# Patient Record
Sex: FEMALE | Race: WHITE | NOT HISPANIC OR LATINO | Employment: FULL TIME | URBAN - METROPOLITAN AREA
[De-identification: names, ages, dates, MRNs, and addresses within clinical notes are randomized per-mention and may not be internally consistent; named-entity substitution may affect disease eponyms.]

---

## 2023-06-30 PROBLEM — F19.20 DRUG DEPENDENCE (MULTI): Status: ACTIVE | Noted: 2023-06-30

## 2023-06-30 PROBLEM — G47.00 INSOMNIA: Status: ACTIVE | Noted: 2023-06-30

## 2023-06-30 PROBLEM — L72.9 INFECTED CYST OF SKIN: Status: ACTIVE | Noted: 2023-06-30

## 2023-06-30 PROBLEM — J45.909 REACTIVE AIRWAY DISEASE (HHS-HCC): Status: ACTIVE | Noted: 2023-06-30

## 2023-06-30 PROBLEM — M54.81 OCCIPITAL NEURALGIA OF LEFT SIDE: Status: ACTIVE | Noted: 2023-06-30

## 2023-06-30 PROBLEM — M54.50 LOW BACK PAIN: Status: ACTIVE | Noted: 2023-06-30

## 2023-06-30 PROBLEM — F11.21 OPIOID TYPE DEPENDENCE IN REMISSION (MULTI): Status: ACTIVE | Noted: 2023-06-30

## 2023-06-30 PROBLEM — B00.1 HERPES LABIALIS: Status: ACTIVE | Noted: 2023-06-30

## 2023-06-30 PROBLEM — R71.8 ELEVATED MCV: Status: ACTIVE | Noted: 2023-06-30

## 2023-06-30 PROBLEM — N80.9 ENDOMETRIOSIS: Status: ACTIVE | Noted: 2023-06-30

## 2023-06-30 PROBLEM — K64.4 EXTERNAL HEMORRHOIDS: Status: ACTIVE | Noted: 2023-06-30

## 2023-06-30 PROBLEM — N63.20 LEFT BREAST LUMP: Status: ACTIVE | Noted: 2023-06-30

## 2023-06-30 PROBLEM — L08.9 INFECTED CYST OF SKIN: Status: ACTIVE | Noted: 2023-06-30

## 2023-06-30 PROBLEM — F32.A DEPRESSION: Status: ACTIVE | Noted: 2023-06-30

## 2023-06-30 PROBLEM — L02.91 ABSCESS: Status: ACTIVE | Noted: 2023-06-30

## 2023-06-30 PROBLEM — K59.00 CONSTIPATION: Status: ACTIVE | Noted: 2023-06-30

## 2023-06-30 PROBLEM — M25.561 RIGHT ANTERIOR KNEE PAIN: Status: ACTIVE | Noted: 2023-06-30

## 2023-06-30 PROBLEM — M25.551 RIGHT HIP PAIN: Status: ACTIVE | Noted: 2023-06-30

## 2023-06-30 PROBLEM — J30.9 ALLERGIC RHINITIS: Status: ACTIVE | Noted: 2023-06-30

## 2023-06-30 PROBLEM — M54.2 CHRONIC NECK PAIN: Status: ACTIVE | Noted: 2023-06-30

## 2023-06-30 PROBLEM — G89.29 CHRONIC NECK PAIN: Status: ACTIVE | Noted: 2023-06-30

## 2023-06-30 PROBLEM — R10.30 LOWER ABDOMINAL PAIN: Status: ACTIVE | Noted: 2023-06-30

## 2023-06-30 PROBLEM — S67.22XA HAND CRUSH INJURY, LEFT, INITIAL ENCOUNTER: Status: ACTIVE | Noted: 2023-06-30

## 2023-06-30 PROBLEM — F11.20 OPIATE DEPENDENCE, CONTINUOUS (MULTI): Status: ACTIVE | Noted: 2023-06-30

## 2023-06-30 PROBLEM — J45.909 ASTHMA (HHS-HCC): Status: ACTIVE | Noted: 2023-06-30

## 2023-06-30 PROBLEM — R05.9 COUGH: Status: ACTIVE | Noted: 2023-06-30

## 2023-06-30 PROBLEM — F11.10: Status: ACTIVE | Noted: 2023-06-30

## 2023-06-30 RX ORDER — BUDESONIDE AND FORMOTEROL FUMARATE DIHYDRATE 160; 4.5 UG/1; UG/1
2 AEROSOL RESPIRATORY (INHALATION) 2 TIMES DAILY
COMMUNITY
Start: 2022-10-05

## 2023-06-30 RX ORDER — BUTALBITAL, ACETAMINOPHEN AND CAFFEINE 300; 40; 50 MG/1; MG/1; MG/1
1 CAPSULE ORAL
COMMUNITY

## 2023-06-30 RX ORDER — IBUPROFEN 800 MG/1
800 TABLET ORAL 4 TIMES DAILY
COMMUNITY
Start: 2021-11-15 | End: 2024-02-05

## 2023-07-03 ENCOUNTER — APPOINTMENT (OUTPATIENT)
Dept: PRIMARY CARE | Facility: CLINIC | Age: 43
End: 2023-07-03
Payer: COMMERCIAL

## 2024-02-05 ENCOUNTER — OFFICE VISIT (OUTPATIENT)
Dept: PAIN MEDICINE | Facility: HOSPITAL | Age: 44
End: 2024-02-05
Payer: COMMERCIAL

## 2024-02-05 ENCOUNTER — APPOINTMENT (OUTPATIENT)
Dept: PAIN MEDICINE | Facility: HOSPITAL | Age: 44
End: 2024-02-05
Payer: COMMERCIAL

## 2024-02-05 VITALS
HEIGHT: 57 IN | DIASTOLIC BLOOD PRESSURE: 95 MMHG | WEIGHT: 99.8 LBS | BODY MASS INDEX: 21.53 KG/M2 | HEART RATE: 80 BPM | SYSTOLIC BLOOD PRESSURE: 153 MMHG | RESPIRATION RATE: 18 BRPM

## 2024-02-05 DIAGNOSIS — M54.81 OCCIPITAL NEURALGIA OF LEFT SIDE: Primary | ICD-10-CM

## 2024-02-05 PROCEDURE — 99204 OFFICE O/P NEW MOD 45 MIN: CPT | Performed by: PHYSICAL MEDICINE & REHABILITATION

## 2024-02-05 PROCEDURE — 99214 OFFICE O/P EST MOD 30 MIN: CPT | Performed by: PHYSICAL MEDICINE & REHABILITATION

## 2024-02-05 RX ORDER — IBUPROFEN 800 MG/1
800 TABLET ORAL 3 TIMES DAILY
Qty: 90 TABLET | Refills: 0 | Status: SHIPPED | OUTPATIENT
Start: 2024-02-05 | End: 2024-05-05

## 2024-02-05 RX ORDER — NORTRIPTYLINE HYDROCHLORIDE 10 MG/1
10-20 CAPSULE ORAL NIGHTLY
Qty: 60 CAPSULE | Refills: 2 | Status: SHIPPED | OUTPATIENT
Start: 2024-02-05 | End: 2024-06-03 | Stop reason: SDUPTHER

## 2024-02-05 ASSESSMENT — ENCOUNTER SYMPTOMS
OCCASIONAL FEELINGS OF UNSTEADINESS: 0
DEPRESSION: 0
LOSS OF SENSATION IN FEET: 0

## 2024-02-05 ASSESSMENT — PATIENT HEALTH QUESTIONNAIRE - PHQ9
2. FEELING DOWN, DEPRESSED OR HOPELESS: NOT AT ALL
SUM OF ALL RESPONSES TO PHQ9 QUESTIONS 1 AND 2: 0
1. LITTLE INTEREST OR PLEASURE IN DOING THINGS: NOT AT ALL

## 2024-02-05 ASSESSMENT — PAIN SCALES - GENERAL: PAINLEVEL: 7

## 2024-02-05 NOTE — PROGRESS NOTES
New pt c/o occipital neuralgia, had injections by Dr Olguin over a year ago. Injections helped. Pain is constant on left side, radiates into head. Describes has tingling/numbing sensation. At times may feel nausea. Tried cervical massager-did not help.  Pain score 7/10    Meds: ibuprofen prn    Had injections and Trigger point injections    Has tried heat, ice, TENS-does not really help. Memory foam pillow-hasn't helped.    OA 2/5/24    Subjective   Patient ID: Cora Solares is a 44 y.o. female who presents for Neuralgia (Occipital neuralgia).  HPI    44-year-old female with history of occipital neuralgia to left side for about 3 years.  No inciting event, patient previously had seen another pain physician where she received occipital nerve blocks as well as trigger point injections.  The occipital nerve blocks helped significantly but has been a number of months or even a year or 2 since she has had any injections.  She has frequent headaches associated with this left-sided occipital pain with radiation from the occiput into bandlike fashion the left side to the top of her head and to above her eye.  Also with some numbness tingling feelings occasionally around the occiput on the left.  She is previously taking Fioricet which does help some.  Also takes 800 mg of ibuprofen and is asking for refill of that when she gets headaches at work.  Has never been on any neuropathic pain medications for this pain specifically.  Was on gabapentin previously for some other issues.  No radiation into her arms and no weakness.  Pain is worse with turning her head and movement.                  Monitoring and compliance:    ORT:    PDUQ:    Office Agreement: 2/5/24      Review of Systems     Current Outpatient Medications   Medication Instructions    albuterol 90 mcg/actuation aerosol powdr breath activated inhaler 2 puffs, inhalation, Every 6 hours    budesonide-formoteroL (Symbicort) 160-4.5 mcg/actuation inhaler 2 puffs,  inhalation, 2 times daily    butalbital-acetaminophen-caff (Fioricet) -40 mg capsule 1 capsule, oral, Every 4 hours RT    ibuprofen 800 mg, oral, 4 times daily        Past Medical History:   Diagnosis Date    Acute laryngitis 03/08/2016    Acute laryngitis    Cutaneous abscess of chest wall 05/28/2014    Chest wall abscess    Cutaneous abscess of left lower limb 10/18/2016    Abscess of left thigh    Cutaneous abscess of right lower limb 08/14/2014    Abscess of right thigh    Noninfective gastroenteritis and colitis, unspecified 10/21/2014    Acute gastroenteritis    Other fecal abnormalities 02/02/2016    Loose stools    Personal history of other diseases of the respiratory system 09/26/2016    History of acute sinusitis    Personal history of other diseases of the respiratory system 01/13/2015    History of influenza    Personal history of other infectious and parasitic diseases 11/30/2015    History of herpes labialis    Personal history of other specified conditions 04/11/2016    History of nausea    Personal history of other specified conditions 11/30/2015    History of dysuria    Personal history of urinary (tract) infections 12/31/2014    History of urinary tract infection    Unspecified abdominal pain 02/26/2016    Abdominal cramping    Unspecified otitis externa, right ear 06/18/2014    Otitis externa of right ear        Past Surgical History:   Procedure Laterality Date    HYSTERECTOMY  05/19/2014    Hysterectomy        Family History   Problem Relation Name Age of Onset    Hypertension Mother      Diabetes Mother      Hypertension Father          Allergies   Allergen Reactions    Magnesium Citrate Other     mouth sores    Penicillins Unknown    Sulfa (Sulfonamide Antibiotics) Unknown    Sulfamethoxazole-Trimethoprim Other        Objective     Vitals:    02/05/24 1356   BP: (!) 153/95   Pulse: 80   Resp: 18        Physical Exam    GENERAL EXAM  Vital Signs: Vital signs to include heart rate,  respiration rate, blood pressure, and temperature were reviewed.  General Appearance:  Awake, alert, healthy appearing, well developed, No acute distress.  Head: Normocephalic without evidence of head injury.  Neck: The appearance of the neck was normal without swelling with a midline trachea.  Eyes: The eyelids and eyebrows exhibited no abnormalities.  Pupils were not pin-point.  Sclera was without icterus.  Lungs: Respiration rhythm and depth was normal.  Respiratory movements were normal without labored breathing.  Cardiovascular: No peripheral edema was present.    Neurological: Patient was oriented to time, place, and person.  Speech was normal.  Balance, gait, and stance were unremarkable.    Psychiatric: Appearance was normal with appropriate dress.  Mood was euthymic and affect was normal.  Skin: Affected regions were without ecchymosis or skin lesions.    Neck (MSK/Neuro/Skin):  Skin: no skin lesions or scars  No visible abnormality, no TTP, AROM and PROM WNL without evidence of instability, crepitus,   Strength:  5/5 in all planes bilateral upper extremities  no masses    Sensation grossly intact to bilateral upper extremities  Deep tendon reflexes equal bilateral upper extremities  No TTP to neck/thoracic muscles    Physical exam as above except:  Tenderness over the occipital area of her left neck/head with positive Tinel's and reproduction of the radiating symptoms up into her head.  Some palpable trigger points in her left upper and middle trapezius muscle as well.      Assessment/Plan   Diagnoses and all orders for this visit:  Occipital neuralgia of left side  -     nortriptyline (Pamelor) 10 mg capsule; Take 1-2 capsules (10-20 mg) by mouth once daily at bedtime.  -     Nerve Block; Future  -     ibuprofen 800 mg tablet; Take 1 tablet (800 mg) by mouth 3 times a day.    41-year-old female with history and physical consistent with occipital neuralgia.  Given that she is had good relief from previous  injections I think it would be reasonable to get her scheduled for a left occipital nerve block which I do under ultrasound guidance.  Patient is on any blood thinners.  Our plan for today overall:  - We will start low-dose nortriptyline 10 to 20 mg at night for neuropathic pain.  - We will schedule patient for left occipital nerve block under ultrasound guidance.  Discussed risks, benefits and alternatives and patient would like to proceed  - I will prescribe patient prescription strength ibuprofen 800 mg to take when she has headache at work however I did instruct patient to try to minimize the amount of NSAID use as these can cause rebound headaches.         This note was generated with the aid of dictation software, there may be typos despite my attempts at proofreading.

## 2024-03-22 ENCOUNTER — HOSPITAL ENCOUNTER (OUTPATIENT)
Dept: RADIOLOGY | Facility: HOSPITAL | Age: 44
Discharge: HOME | End: 2024-03-22
Payer: COMMERCIAL

## 2024-03-22 ENCOUNTER — HOSPITAL ENCOUNTER (OUTPATIENT)
Dept: GASTROENTEROLOGY | Facility: HOSPITAL | Age: 44
Discharge: HOME | End: 2024-03-22
Payer: COMMERCIAL

## 2024-03-22 VITALS
HEIGHT: 58 IN | DIASTOLIC BLOOD PRESSURE: 78 MMHG | RESPIRATION RATE: 14 BRPM | BODY MASS INDEX: 20.57 KG/M2 | SYSTOLIC BLOOD PRESSURE: 110 MMHG | WEIGHT: 98 LBS | TEMPERATURE: 97.9 F | HEART RATE: 59 BPM | OXYGEN SATURATION: 100 %

## 2024-03-22 DIAGNOSIS — M54.81 OCCIPITAL NEURALGIA OF LEFT SIDE: ICD-10-CM

## 2024-03-22 DIAGNOSIS — R52 PAIN: ICD-10-CM

## 2024-03-22 PROCEDURE — 2500000004 HC RX 250 GENERAL PHARMACY W/ HCPCS (ALT 636 FOR OP/ED): Performed by: PHYSICAL MEDICINE & REHABILITATION

## 2024-03-22 PROCEDURE — 64405 NJX AA&/STRD GR OCPL NRV: CPT | Performed by: PHYSICAL MEDICINE & REHABILITATION

## 2024-03-22 PROCEDURE — 2500000005 HC RX 250 GENERAL PHARMACY W/O HCPCS: Performed by: PHYSICAL MEDICINE & REHABILITATION

## 2024-03-22 PROCEDURE — 64405 NJX AA&/STRD GR OCPL NRV: CPT | Mod: LT | Performed by: PHYSICAL MEDICINE & REHABILITATION

## 2024-03-22 RX ORDER — METHYLPREDNISOLONE ACETATE 40 MG/ML
INJECTION, SUSPENSION INTRA-ARTICULAR; INTRALESIONAL; INTRAMUSCULAR; SOFT TISSUE AS NEEDED
Status: COMPLETED | OUTPATIENT
Start: 2024-03-22 | End: 2024-03-22

## 2024-03-22 RX ORDER — ROPIVACAINE HYDROCHLORIDE 5 MG/ML
INJECTION, SOLUTION EPIDURAL; INFILTRATION; PERINEURAL AS NEEDED
Status: COMPLETED | OUTPATIENT
Start: 2024-03-22 | End: 2024-03-22

## 2024-03-22 RX ORDER — LIDOCAINE HYDROCHLORIDE 5 MG/ML
INJECTION, SOLUTION INFILTRATION; INTRAVENOUS AS NEEDED
Status: COMPLETED | OUTPATIENT
Start: 2024-03-22 | End: 2024-03-22

## 2024-03-22 RX ADMIN — LIDOCAINE HYDROCHLORIDE 15 ML: 5 INJECTION, SOLUTION INFILTRATION at 09:53

## 2024-03-22 RX ADMIN — METHYLPREDNISOLONE ACETATE 40 MG: 40 INJECTION, SUSPENSION INTRA-ARTICULAR; INTRALESIONAL; INTRAMUSCULAR; SOFT TISSUE at 09:54

## 2024-03-22 RX ADMIN — ROPIVACAINE HYDROCHLORIDE 15 ML: 5 INJECTION, SOLUTION EPIDURAL; INFILTRATION; PERINEURAL at 09:54

## 2024-03-22 ASSESSMENT — PAIN SCALES - GENERAL
PAINLEVEL_OUTOF10: 4
PAINLEVEL_OUTOF10: 4

## 2024-03-22 ASSESSMENT — COLUMBIA-SUICIDE SEVERITY RATING SCALE - C-SSRS
6. HAVE YOU EVER DONE ANYTHING, STARTED TO DO ANYTHING, OR PREPARED TO DO ANYTHING TO END YOUR LIFE?: NO
2. HAVE YOU ACTUALLY HAD ANY THOUGHTS OF KILLING YOURSELF?: NO
1. IN THE PAST MONTH, HAVE YOU WISHED YOU WERE DEAD OR WISHED YOU COULD GO TO SLEEP AND NOT WAKE UP?: NO

## 2024-03-22 ASSESSMENT — PAIN - FUNCTIONAL ASSESSMENT
PAIN_FUNCTIONAL_ASSESSMENT: 0-10
PAIN_FUNCTIONAL_ASSESSMENT: 0-10

## 2024-03-22 NOTE — H&P
History Of Present Illness  Cora Solares is a 44 y.o. female presents for procedure state below. Endorses no changes in past medical history or medical health since last seen in clinic.     Past Medical History  She has a past medical history of Acute laryngitis (03/08/2016), Cutaneous abscess of chest wall (05/28/2014), Cutaneous abscess of left lower limb (10/18/2016), Cutaneous abscess of right lower limb (08/14/2014), Noninfective gastroenteritis and colitis, unspecified (10/21/2014), Other fecal abnormalities (02/02/2016), Personal history of other diseases of the respiratory system (09/26/2016), Personal history of other diseases of the respiratory system (01/13/2015), Personal history of other infectious and parasitic diseases (11/30/2015), Personal history of other specified conditions (04/11/2016), Personal history of other specified conditions (11/30/2015), Personal history of urinary (tract) infections (12/31/2014), Unspecified abdominal pain (02/26/2016), and Unspecified otitis externa, right ear (06/18/2014).    Surgical History  She has a past surgical history that includes Hysterectomy (05/19/2014).     Social History  She reports that she has been smoking cigarettes. She has never used smokeless tobacco. No history on file for alcohol use and drug use.    Family History  Family History   Problem Relation Name Age of Onset    Hypertension Mother      Diabetes Mother      Hypertension Father          Allergies  Magnesium citrate, Penicillins, Sulfa (sulfonamide antibiotics), and Sulfamethoxazole-trimethoprim    Review of Symptoms:   Constitutional: Negative for chills, diaphoresis or fever  HENT: Negative for neck swelling  Eyes:.  Negative for eye pain  Respiratory:.  Negative for cough, shortness of breath or wheezing    Cardiovascular:.  Negative for chest pain or palpitations  Gastrointestinal:.  Negative for abdominal pain, nausea and vomiting  Genitourinary:.  Negative for  urgency  Musculoskeletal:  Positive for back pain. Positive for joint pain. Denies falls within the past 3 months.  Skin: Negative for wounds or itching   Neurological: Negative for dizziness, seizures, loss of consciousness and weakness  Endo/Heme/Allergies: Does not bruise/bleed easily  Psychiatric/Behavioral: Negative for depression. The patient does not appear anxious.       PHYSICAL EXAM  Vitals signs reviewed  Constitutional:       General: Not in acute distress     Appearance: Normal appearance. Not ill-appearing.  HENT:     Head: Normocephalic and atraumatic  Eyes:     Conjunctiva/sclera: Conjunctivae normal  Cardiovascular:     Rate and Rhythm: Normal rate and regular rhythm  Pulmonary:     Effort: No respiratory distress  Abdominal:     Palpations: Abdomen is soft  Musculoskeletal: MCINTYRE  Skin:     General: Skin is warm and dry  Neurological:     General: No focal deficit present  Psychiatric:         Mood and Affect: Mood normal         Behavior: Behavior normal     Last Recorded Vitals  There were no vitals taken for this visit.    Relevant Results  Current Outpatient Medications   Medication Instructions    albuterol 90 mcg/actuation aerosol powdr breath activated inhaler 2 puffs, inhalation, Every 6 hours    budesonide-formoteroL (Symbicort) 160-4.5 mcg/actuation inhaler 2 puffs, inhalation, 2 times daily    butalbital-acetaminophen-caff (Fioricet) -40 mg capsule 1 capsule, oral, Every 4 hours RT    ibuprofen 800 mg, oral, 3 times daily    nortriptyline (PAMELOR) 10-20 mg, oral, Nightly       No results found for this or any previous visit from the past 1000 days.     No image results found.       No diagnosis found.     ASSESSMENT/PLAN  Cora Solares is a 44 y.o. female presents for left occipital nerve block under ultrasound guidance.     Our plan is as follows:  - Follow In pain clinic  - Continue to participate in physical therapy as well as physician directed home exercises  - Continue pain  medications as prescribed       Placido Hawkins MD

## 2024-03-22 NOTE — PROCEDURES
Nerve Block    Date/Time: 3/22/2024 9:56 AM    Performed by: Hussein Dawson MD  Authorized by: Hussein Dawson MD    Consent:     Consent obtained:  Written    Consent given by:  Patient    Risks, benefits, and alternatives were discussed: yes      Risks discussed:  Nerve damage, infection, allergic reaction, swelling, bleeding and pain    Alternatives discussed:  No treatment, delayed treatment and alternative treatment  Universal protocol:     Procedure explained and questions answered to patient or proxy's satisfaction: yes      Relevant documents present and verified: yes      Test results available: yes      Imaging studies available: yes      Required blood products, implants, devices, and special equipment available: yes      Site/side marked: yes      Immediately prior to procedure, a time out was called: yes      Patient identity confirmed:  Verbally with patient, hospital-assigned identification number and arm band  Comments:      The patient was positioned comfortably in the prone position and was prepped and draped in a sterile fashion.  Sterile precautions, including sterile gloves, disposable cap and masks were worn for the procedure at all times. Skeletal and soft tissue landmarks were identified under palpation and ultrasound if utilized.  The target nerve and surrounding vascular structures were identified utlizing ultrasound. There was no evidence of infection at the site(s) of needle insertion. The skin and subcutaneous tissue at insertion site was anesthetized with 1% lidocaine with or without sodium bicarbonate.   A needle was advanced utilizing an in-plane approach to the target nerve(s).  The needle was flushed and/or restyletted and removed.  Pressure was held, and after ensuring hemostasis and the absence of hematoma, an adhesive bandage was applied to the site of needle insertion.    Nerve: Greater occipital  Laterality: Left  Medications: [3mL 0.5% Ropivacaine] [40 mg  Depo-Medrol]

## 2024-03-22 NOTE — DISCHARGE INSTRUCTIONS
You had a pain management procedure today.    Observe/ monitor for the following signs & symptoms:  If you notice Excessive bleeding (slow general oozing that completely soaks the dressing, or fresh bright red bleeding).   In either case, apply pressure to the area, elevate it if possible & call your doctor at once.    Also observe for:  Change in color  Numbness/tingling  Coldness to the touch  Swelling  Drainage  Temperature of 101.5 or higher.  Increased, uncontrollable pain.    *If you notice the above signs & symptoms, please call your doctor right away!*      Discharge Instructions:    Your pain may not be gone immediately after this procedure; it generally takes 3 to 5 days for the steroid to work.   Keep the needle site clean & dry for 24 hours.  Continue your present medications.  Make an appointment to see your doctor in 2-3 weeks.  If any problems occur, or if you have any further questions, please call as soon as possible. If you find that you cannot reach your doctor, but feel that the condition nees a doctor's attention, go to the closest emergency department & take this discharge paper with you.       Dr. Dawson's Office: [phone]

## 2024-04-22 ENCOUNTER — APPOINTMENT (OUTPATIENT)
Dept: PAIN MEDICINE | Facility: HOSPITAL | Age: 44
End: 2024-04-22
Payer: COMMERCIAL

## 2024-04-29 ENCOUNTER — OFFICE VISIT (OUTPATIENT)
Dept: PAIN MEDICINE | Facility: HOSPITAL | Age: 44
End: 2024-04-29
Payer: COMMERCIAL

## 2024-04-29 VITALS
SYSTOLIC BLOOD PRESSURE: 119 MMHG | DIASTOLIC BLOOD PRESSURE: 82 MMHG | HEART RATE: 88 BPM | HEIGHT: 58 IN | BODY MASS INDEX: 21.24 KG/M2 | OXYGEN SATURATION: 98 % | RESPIRATION RATE: 16 BRPM | WEIGHT: 101.2 LBS

## 2024-04-29 DIAGNOSIS — G89.29 CHRONIC NECK PAIN: ICD-10-CM

## 2024-04-29 DIAGNOSIS — M54.81 OCCIPITAL NEURALGIA OF LEFT SIDE: Primary | ICD-10-CM

## 2024-04-29 DIAGNOSIS — M54.2 CHRONIC NECK PAIN: ICD-10-CM

## 2024-04-29 PROCEDURE — 99214 OFFICE O/P EST MOD 30 MIN: CPT | Performed by: CLINICAL NURSE SPECIALIST

## 2024-04-29 ASSESSMENT — COLUMBIA-SUICIDE SEVERITY RATING SCALE - C-SSRS
1. IN THE PAST MONTH, HAVE YOU WISHED YOU WERE DEAD OR WISHED YOU COULD GO TO SLEEP AND NOT WAKE UP?: NO
6. HAVE YOU EVER DONE ANYTHING, STARTED TO DO ANYTHING, OR PREPARED TO DO ANYTHING TO END YOUR LIFE?: NO
2. HAVE YOU ACTUALLY HAD ANY THOUGHTS OF KILLING YOURSELF?: NO

## 2024-04-29 ASSESSMENT — ENCOUNTER SYMPTOMS
LOSS OF SENSATION IN FEET: 0
OCCASIONAL FEELINGS OF UNSTEADINESS: 0
DEPRESSION: 0

## 2024-04-29 ASSESSMENT — PATIENT HEALTH QUESTIONNAIRE - PHQ9
2. FEELING DOWN, DEPRESSED OR HOPELESS: NOT AT ALL
1. LITTLE INTEREST OR PLEASURE IN DOING THINGS: NOT AT ALL
SUM OF ALL RESPONSES TO PHQ9 QUESTIONS 1 AND 2: 0

## 2024-04-29 NOTE — PROGRESS NOTES
Subjective   Patient ID: Cora Solares is a 44 y.o. female who presents for occipital neuralgia.    HPI    44-year-old female with history of occipital neuralgia left side for approximately 3 to 4 years presents for follow-up.  Previous occipital nerve blocks as well as trigger point injections with a different pain physician.  She felt that the occipital nerve blocks provided significant relief in the past.  Occipital pain accompanied by frequent headaches.  Manages her pain with ibuprofen 800 mg.  Added nortriptyline 10 to 20 mg at bedtime at her last office visit for neuropathic pain.  Scheduled at her last office visit to repeat left greater occipital nerve block.  She presents at today's office visit for follow-up after recent injection.  She is endorsing greater than 75% relief with her most recent left greater occipital nerve block.  She has noted a decrease in headache days over the past 4 to 5 weeks.  She is also noted that she is able to work with less pain.  She denies any numbness/tingling or weakness.  She has noted some return of left occipital pain with occasional radiation from the left occiput posteriorly into her head.  She states that she continues to get some relief with this injection, however, she would like to discuss repeating this injection in the next 4 to 6 weeks.  She does not want the pain to get so out of control with increasing headaches as this impaired her ability to work.  She does feel that work activity which includes pushing and pulling with her left arm consistently as well as looking down increases her pain.  And her pain with nortriptyline 20 mg at bedtime which she does feel has been helpful for neuropathic symptoms.  She continues to use ibuprofen 800 more for pain left calf injury and last for occipital pain.  Continues to utilize medical marijuana.    Pt is here for injection follow up. Pt has left sided neck pain.    Injection 3/22/24 ONB Left- 75% relief would like to  repeat    Medications: Nortriptyline 2 capsules @ bedtime      OAR[facility]:  Merle Kc, APRN-CNP, APRN-CNS on 4/29/2024  3:21 PM  I have personally reviewed the OARRS report for Cora Solares. I have considered the risks of abuse, dependence, addiction and diversion    Is the patient prescribed a combination of a benzodiazepine and opioid?  No    Last Urine Drug Screen / ordered today: No  No results found for this or any previous visit (from the past 8760 hour(s)).  N/A    Controlled Substance Agreement:  Date of the Last Agreement:       Monitoring and compliance:    ORT:    PDUQ:    Office Agreement:      Review of Systems    ROS:   General: No fevers, chills, weight loss  Skin: Negative for lesions  Eyes: No acute vision changes  Ears: No vertigo  Nose, mouth, throat: No difficulty swallowing or speaking  Respiratory: No cough, shortness of breath, cyanosis  Cardiovascular: Negative for chest pain syncope or palpitation  Gastrointestinal: No constipation, nausea, vomiting  Neurological: Positive for intermittent headache,   Psychological: Negative for severe or debilitating anxiety, depression. Negative memory loss  Musculoskeletal: Positive for arthralgia, myalgia and pain  Endocrine: Negative for weight gain, appetite changes, excessive sweating  Allergy/immune: Negative    All 13 systems were reviewed and are within normal levels except as noted or in the history of present illness.  Positive or pertinent negative responses are noted or were in the history of present illness. As noted, the patient denies significant or impairing weakness in the bilateral upper and lower extremities, medication induced constipation, and bowel or bladder incontinence.     Current Outpatient Medications:     albuterol 90 mcg/actuation aerosol powdr breath activated inhaler, Inhale 2 puffs every 6 hours., Disp: , Rfl:     budesonide-formoteroL (Symbicort) 160-4.5 mcg/actuation inhaler, Inhale 2 puffs 2 times a day., Disp: ,  Rfl:     ibuprofen 800 mg tablet, Take 1 tablet (800 mg) by mouth 3 times a day., Disp: 90 tablet, Rfl: 0    nortriptyline (Pamelor) 10 mg capsule, Take 1-2 capsules (10-20 mg) by mouth once daily at bedtime., Disp: 60 capsule, Rfl: 2    butalbital-acetaminophen-caff (Fioricet) -40 mg capsule, Take 1 capsule by mouth every 4 hours., Disp: , Rfl:      Past Medical History:   Diagnosis Date    Acute laryngitis 03/08/2016    Acute laryngitis    Cutaneous abscess of chest wall 05/28/2014    Chest wall abscess    Cutaneous abscess of left lower limb 10/18/2016    Abscess of left thigh    Cutaneous abscess of right lower limb 08/14/2014    Abscess of right thigh    Noninfective gastroenteritis and colitis, unspecified 10/21/2014    Acute gastroenteritis    Other fecal abnormalities 02/02/2016    Loose stools    Personal history of other diseases of the respiratory system 09/26/2016    History of acute sinusitis    Personal history of other diseases of the respiratory system 01/13/2015    History of influenza    Personal history of other infectious and parasitic diseases 11/30/2015    History of herpes labialis    Personal history of other specified conditions 04/11/2016    History of nausea    Personal history of other specified conditions 11/30/2015    History of dysuria    Personal history of urinary (tract) infections 12/31/2014    History of urinary tract infection    Unspecified abdominal pain 02/26/2016    Abdominal cramping    Unspecified otitis externa, right ear 06/18/2014    Otitis externa of right ear        Past Surgical History:   Procedure Laterality Date    HYSTERECTOMY  05/19/2014    Hysterectomy        Family History   Problem Relation Name Age of Onset    Hypertension Mother      Diabetes Mother      Hypertension Father          Allergies   Allergen Reactions    Magnesium Citrate Other     mouth sores    Penicillins Unknown    Sulfa (Sulfonamide Antibiotics) Unknown    Sulfamethoxazole-Trimethoprim  "Other        Objective     Visit Vitals  /82   Pulse 88   Resp 16   Ht 1.461 m (4' 9.5\")   Wt 45.9 kg (101 lb 3.2 oz)   SpO2 98%   BMI 21.52 kg/m²   OB Status Hysterectomy   Smoking Status Every Day   BSA 1.36 m²        Physical Exam    PE:  General: Well-developed, well-nourished, no acute distress. The patient demonstrates no pain behavior, symptom magnification or overt drug-seeking behavior.  Eye: Pupils appropriate for room lighting  Neck/thyroid: No obvious goiter or enlargement of neck noted  Respiratory exam: Normal respiratory effort, unlabored respiration. No accessory muscle use noted  Cardiac exam: Bilateral radial pulses intact  Abdominal: Nondistended  Spine, cervical: Mild tenderness to paraspinous musculature upper trapezius muscles left greater than right.  Tenderness over left greater occipital protuberance.  Flexion and extension intact with extension exacerbating symptoms on the left.  Rotational twisting intact.  Spine, lumbar: The patient is able to rise from a seated to standing position without hesitancy, push off, or delay. Gait is grossly nonantalgic.  No tenderness to paraspinous musculature is noted.   Neurologic exam: Muscle strength is antigravity in all 4 extremities.  Equal muscle strength bilateral upper extremities 5/5.  Psychiatric exam: Judgment and insight normal, affect normal, speech is fluent, affect appropriate, demonstrating no signs of hypersomnolence, sedation, or confusion        Assessment/Plan   Problem List Items Addressed This Visit             ICD-10-CM    Chronic neck pain M54.2, G89.29    Occipital neuralgia of left side - Primary M54.81     44 year-old female with history and physical consistent with left-sided occipital neuralgia.  Patient has previously done very well with greater occipital nerve blocks providing significant sustained relief.  Scheduled at her last office visit to repeat left greater occipital nerve block.  Patient endorsed greater than " 75% relief from her most recent greater occipital nerve block.  She does feel that she continues to receive relief, however, she has noted some tenderness and return of symptoms. She would like to schedule a repeat left greater occipital nerve block in 4 to 6 weeks so that pain does not get out of control and interfere with her ability to work.  We will schedule the patient to repeat left greater occipital nerve block in 4 to 6 weeks.  If at that time she continues to receive relief from her most recent injection she may cancel this appointment.  Patient is not on blood thinners.  She continues to benefit from nortriptyline 20 mg at bedtime.  She will use ibuprofen 800 mg as needed and is using it more often for her calf injury and less often for occipital pain.  Plan discussed with patient at today's office visit.      - Continue low-dose nortriptyline 10 to 20 mg at night for neuropathic pain.  - We will schedule patient to repeat left occipital nerve block under ultrasound guidance.  Discussed risks, benefits and alternatives and patient would like to proceed CPT.  [id number].  If at the time of the patient's injection she continues to receive relief from her occipital nerve block she may cancel this appointment.  - Continue prescription strength ibuprofen 800 mg to take when she has headache at work.  Instructedpatient to try to minimize the amount of NSAID use as these can cause rebound headaches.  -Patient will follow-up 4 weeks after repeating her injection for reevaluation.    Disclaimer: This note was transcribed using an audio transcription device.  As such, minor errors may be present with regard to spelling, punctuation, and inadvertent word insertion.  Please disregard such errors.           Relevant Orders    Nerve Block

## 2024-04-29 NOTE — ASSESSMENT & PLAN NOTE
44 year-old female with history and physical consistent with left-sided occipital neuralgia.  Patient has previously done very well with greater occipital nerve blocks providing significant sustained relief.  Scheduled at her last office visit to repeat left greater occipital nerve block.  Patient endorsed greater than 75% relief from her most recent greater occipital nerve block.  She does feel that she continues to receive relief, however, she has noted some tenderness and return of symptoms. She would like to schedule a repeat left greater occipital nerve block in 4 to 6 weeks so that pain does not get out of control and interfere with her ability to work.  We will schedule the patient to repeat left greater occipital nerve block in 4 to 6 weeks.  If at that time she continues to receive relief from her most recent injection she may cancel this appointment.  Patient is not on blood thinners.  She continues to benefit from nortriptyline 20 mg at bedtime.  She will use ibuprofen 800 mg as needed and is using it more often for her calf injury and less often for occipital pain.  Plan discussed with patient at today's office visit.      - Continue low-dose nortriptyline 10 to 20 mg at night for neuropathic pain.  - We will schedule patient to repeat left occipital nerve block under ultrasound guidance.  Discussed risks, benefits and alternatives and patient would like to proceed CPT.  [id number].  If at the time of the patient's injection she continues to receive relief from her occipital nerve block she may cancel this appointment.  - Continue prescription strength ibuprofen 800 mg to take when she has headache at work.  Instructedpatient to try to minimize the amount of NSAID use as these can cause rebound headaches.  -Patient will follow-up 4 weeks after repeating her injection for reevaluation.    Disclaimer: This note was transcribed using an audio transcription device.  As such, minor errors may be present with  regard to spelling, punctuation, and inadvertent word insertion.  Please disregard such errors.

## 2024-05-24 ENCOUNTER — HOSPITAL ENCOUNTER (OUTPATIENT)
Dept: GASTROENTEROLOGY | Facility: HOSPITAL | Age: 44
Discharge: HOME | End: 2024-05-24
Payer: COMMERCIAL

## 2024-05-24 VITALS
WEIGHT: 101 LBS | HEIGHT: 58 IN | DIASTOLIC BLOOD PRESSURE: 80 MMHG | TEMPERATURE: 98 F | RESPIRATION RATE: 16 BRPM | HEART RATE: 60 BPM | OXYGEN SATURATION: 100 % | SYSTOLIC BLOOD PRESSURE: 119 MMHG | BODY MASS INDEX: 21.2 KG/M2

## 2024-05-24 DIAGNOSIS — M54.81 OCCIPITAL NEURALGIA OF LEFT SIDE: ICD-10-CM

## 2024-05-24 PROCEDURE — 7100000010 HC PHASE TWO TIME - EACH INCREMENTAL 1 MINUTE

## 2024-05-24 PROCEDURE — 64405 NJX AA&/STRD GR OCPL NRV: CPT | Performed by: PHYSICAL MEDICINE & REHABILITATION

## 2024-05-24 PROCEDURE — 2500000004 HC RX 250 GENERAL PHARMACY W/ HCPCS (ALT 636 FOR OP/ED): Performed by: PHYSICAL MEDICINE & REHABILITATION

## 2024-05-24 PROCEDURE — 2500000005 HC RX 250 GENERAL PHARMACY W/O HCPCS: Performed by: PHYSICAL MEDICINE & REHABILITATION

## 2024-05-24 PROCEDURE — 7100000009 HC PHASE TWO TIME - INITIAL BASE CHARGE

## 2024-05-24 PROCEDURE — 64405 NJX AA&/STRD GR OCPL NRV: CPT | Mod: LT | Performed by: PHYSICAL MEDICINE & REHABILITATION

## 2024-05-24 RX ORDER — ROPIVACAINE HYDROCHLORIDE 5 MG/ML
INJECTION, SOLUTION EPIDURAL; INFILTRATION; PERINEURAL AS NEEDED
Status: COMPLETED | OUTPATIENT
Start: 2024-05-24 | End: 2024-05-24

## 2024-05-24 RX ORDER — METHYLPREDNISOLONE ACETATE 40 MG/ML
INJECTION, SUSPENSION INTRA-ARTICULAR; INTRALESIONAL; INTRAMUSCULAR; SOFT TISSUE AS NEEDED
Status: COMPLETED | OUTPATIENT
Start: 2024-05-24 | End: 2024-05-24

## 2024-05-24 RX ORDER — LIDOCAINE HYDROCHLORIDE 5 MG/ML
INJECTION, SOLUTION INFILTRATION; INTRAVENOUS AS NEEDED
Status: COMPLETED | OUTPATIENT
Start: 2024-05-24 | End: 2024-05-24

## 2024-05-24 RX ADMIN — METHYLPREDNISOLONE ACETATE 40 MG: 40 INJECTION, SUSPENSION INTRA-ARTICULAR; INTRALESIONAL; INTRAMUSCULAR; SOFT TISSUE at 11:27

## 2024-05-24 RX ADMIN — ROPIVACAINE HYDROCHLORIDE 10 ML: 5 INJECTION, SOLUTION EPIDURAL; INFILTRATION; PERINEURAL at 11:27

## 2024-05-24 RX ADMIN — LIDOCAINE HYDROCHLORIDE 10 ML: 5 INJECTION, SOLUTION INFILTRATION at 11:25

## 2024-05-24 ASSESSMENT — PAIN - FUNCTIONAL ASSESSMENT
PAIN_FUNCTIONAL_ASSESSMENT: 0-10
PAIN_FUNCTIONAL_ASSESSMENT: 0-10

## 2024-05-24 ASSESSMENT — PAIN SCALES - GENERAL
PAINLEVEL_OUTOF10: 0 - NO PAIN
PAINLEVEL_OUTOF10: 6

## 2024-05-24 NOTE — PROCEDURES
Nerve Block    Date/Time: 5/24/2024 11:28 AM    Performed by: Hussein Dawson MD  Authorized by: Merle Kc, APRN-CNP, APRGURU-CNS    Consent:     Consent obtained:  Written    Consent given by:  Patient    Risks, benefits, and alternatives were discussed: yes      Risks discussed:  Nerve damage, infection, allergic reaction, swelling, bleeding and pain    Alternatives discussed:  No treatment, delayed treatment and alternative treatment  Universal protocol:     Procedure explained and questions answered to patient or proxy's satisfaction: yes      Relevant documents present and verified: yes      Test results available: yes      Imaging studies available: yes      Required blood products, implants, devices, and special equipment available: yes      Site/side marked: yes      Immediately prior to procedure, a time out was called: yes      Patient identity confirmed:  Verbally with patient, hospital-assigned identification number and arm band  Comments:      The patient was positioned comfortably in the prone position and was prepped and draped in a sterile fashion.  Sterile precautions, including sterile gloves, disposable cap and masks were worn for the procedure at all times. Skeletal and soft tissue landmarks were identified under palpation and ultrasound if utilized.  The target nerve and surrounding vascular structures were identified utlizing ultrasound. There was no evidence of infection at the site(s) of needle insertion. The skin and subcutaneous tissue at insertion site was anesthetized with 1% lidocaine with or without sodium bicarbonate.   A needle was advanced utilizing an in-plane approach to the target nerve(s).  The needle was flushed and/or restyletted and removed.  Pressure was held, and after ensuring hemostasis and the absence of hematoma, an adhesive bandage was applied to the site of needle insertion.    Nerve: Greater occipital  Laterality: Left  Medications: [4mL 0.5% Ropivacaine] [40 mg  Depo-Medrol]

## 2024-05-24 NOTE — H&P
Patient ID: Patient with left-sided occipital neuralgia who presents for the scheduled procedure.  No changes since last visit      Patient denies any recent antibiotic use or infections, denies any blood thinner use, and denies contrast or local anesthetic allergies           GENERAL EXAM  Vital Signs: Vital signs to include heart rate, respiration rate, blood pressure, and temperature were reviewed.  General Appearance:  Awake, alert, healthy appearing, well developed, No acute distress.  Head: Normocephalic without evidence of head injury.  Neck: The appearance of the neck was normal without swelling with a midline trachea.  Eyes: The eyelids and eyebrows exhibited no abnormalities.  Pupils were not pin-point.  Sclera was without icterus.  Lungs: Respiration rhythm and depth was normal.  Respiratory movements were normal without labored breathing.  Cardiovascular: No peripheral edema was present.    Neurological: Patient was oriented to time, place, and person.  Speech was normal.  Balance, gait, and stance were unremarkable.    Psychiatric: Appearance was normal with appropriate dress.  Mood was euthymic and affect was normal.  Skin: Affected regions were without ecchymosis or skin lesions.        Physical exam as above except:        Assessment/Plan    Patient with left-sided occipital neuralgia here for left occipital nerve block

## 2024-05-24 NOTE — DISCHARGE INSTRUCTIONS
You had a pain management procedure today.    Observe/ monitor for the following signs & symptoms:  If you notice Excessive bleeding (slow general oozing that completely soaks the dressing, or fresh bright red bleeding).   In either case, apply pressure to the area, elevate it if possible & call your doctor at once.    Also observe for:  Change in color  Numbness/tingling  Coldness to the touch  Swelling  Drainage  Temperature of 101.5 or higher.  Increased, uncontrollable pain.    *If you notice the above signs & symptoms, please call your doctor right away!*      Discharge Instructions:    Your pain may not be gone immediately after this procedure; it generally takes 3 to 5 days for the steroid to work.   Keep the needle site clean & dry for 24 hours.  Continue your present medications.  Make an appointment to see your doctor in 2-3 weeks.  If any problems occur, or if you have any further questions, please call as soon as possible. If you find that you cannot reach your doctor, but feel that the condition nees a doctor's attention, go to the closest emergency department & take this discharge paper with you.       Dr. Dawson's Office: [phone]

## 2024-06-03 DIAGNOSIS — M54.81 OCCIPITAL NEURALGIA OF LEFT SIDE: ICD-10-CM

## 2024-06-03 RX ORDER — NORTRIPTYLINE HYDROCHLORIDE 10 MG/1
10-20 CAPSULE ORAL NIGHTLY
Qty: 60 CAPSULE | Refills: 2 | Status: SHIPPED | OUTPATIENT
Start: 2024-06-03 | End: 2024-09-01

## 2024-06-03 NOTE — TELEPHONE ENCOUNTER
Pt is requesting refill of   nortriptyline 10 mg 1-2 capsules @ [facility] [place]                                                        LV:  4/29/24                  NV: 6/17/24                                         Pended RX to GREG Jolly for transmission to pharmacy.

## 2024-06-17 ENCOUNTER — OFFICE VISIT (OUTPATIENT)
Dept: PAIN MEDICINE | Facility: HOSPITAL | Age: 44
End: 2024-06-17
Payer: COMMERCIAL

## 2024-06-17 VITALS
DIASTOLIC BLOOD PRESSURE: 85 MMHG | HEIGHT: 58 IN | OXYGEN SATURATION: 98 % | BODY MASS INDEX: 21.2 KG/M2 | RESPIRATION RATE: 16 BRPM | HEART RATE: 82 BPM | WEIGHT: 101 LBS | SYSTOLIC BLOOD PRESSURE: 142 MMHG

## 2024-06-17 DIAGNOSIS — M54.81 OCCIPITAL NEURALGIA OF LEFT SIDE: Primary | ICD-10-CM

## 2024-06-17 PROCEDURE — 99214 OFFICE O/P EST MOD 30 MIN: CPT | Performed by: CLINICAL NURSE SPECIALIST

## 2024-06-17 RX ORDER — DIAZEPAM 5 MG/1
5 TABLET ORAL ONCE AS NEEDED
OUTPATIENT
Start: 2024-06-17

## 2024-06-17 RX ORDER — NORTRIPTYLINE HYDROCHLORIDE 10 MG/1
20-30 CAPSULE ORAL NIGHTLY
Qty: 90 CAPSULE | Refills: 2 | Status: SHIPPED | OUTPATIENT
Start: 2024-06-17

## 2024-06-17 RX ORDER — IBUPROFEN 800 MG/1
800 TABLET ORAL EVERY 8 HOURS PRN
Qty: 90 TABLET | Refills: 0 | Status: SHIPPED | OUTPATIENT
Start: 2024-06-17 | End: 2024-07-17

## 2024-06-17 RX ORDER — IBUPROFEN 800 MG/1
800 TABLET ORAL EVERY 8 HOURS PRN
COMMUNITY
End: 2024-06-17 | Stop reason: SDUPTHER

## 2024-06-17 ASSESSMENT — ENCOUNTER SYMPTOMS
OCCASIONAL FEELINGS OF UNSTEADINESS: 0
DEPRESSION: 0
LOSS OF SENSATION IN FEET: 0

## 2024-06-17 ASSESSMENT — COLUMBIA-SUICIDE SEVERITY RATING SCALE - C-SSRS
2. HAVE YOU ACTUALLY HAD ANY THOUGHTS OF KILLING YOURSELF?: NO
6. HAVE YOU EVER DONE ANYTHING, STARTED TO DO ANYTHING, OR PREPARED TO DO ANYTHING TO END YOUR LIFE?: NO
1. IN THE PAST MONTH, HAVE YOU WISHED YOU WERE DEAD OR WISHED YOU COULD GO TO SLEEP AND NOT WAKE UP?: NO

## 2024-06-17 ASSESSMENT — PATIENT HEALTH QUESTIONNAIRE - PHQ9
1. LITTLE INTEREST OR PLEASURE IN DOING THINGS: NOT AT ALL
2. FEELING DOWN, DEPRESSED OR HOPELESS: NOT AT ALL
SUM OF ALL RESPONSES TO PHQ9 QUESTIONS 1 AND 2: 0

## 2024-06-17 NOTE — PROGRESS NOTES
Subjective   Patient ID: Cora Solares is a 44 y.o. female who presents for occipital neuralgia    HPI    44-year-old female presents for follow-up of left-sided occipital neuralgia.  Patient also has headaches that accompany occipital pain.  Previously had done injections with significant relief.  She was scheduled to repeat left greater occipital nerve block at her last office visit.  Added nortriptyline 20 mg at bedtime and ibuprofen 800 mg as needed.  Presents at today's office visit with again improvement after most recent greater occipital nerve block on 5/24/2024.  Patient states she is receiving greater than 80% relief from her recent injection and has noted a significant decrease in headaches.  She also feels that the addition of nortriptyline has been beneficial, although relief does not last.  She is currently taking 20 mg at bedtime.  She would like to increase the dose to help with the neuropathic component of pain.  She will use ibuprofen 800 mg sparingly when her headaches are more severe.  She has been able to decrease use of ibuprofen after her recent nerve block. Continued benefit with chiropractic visits and TENS unit.  She would like to discuss repeating her greater occipital nerve block in the next 6 to 8 weeks so that she may stay ahead of her pain and not experience a severe exacerbation.    Pt is here for injection follow up. Pt has left sided occipital neuralgia.    5/10    Injection 5/24/24 ONB Left- 80% relief- would like to repeat    Medications: Nortriptyline 2 capsules @ bedtime- would like to possibly increase and add ibuprofen?  OARRS:  No data recorded  I have personally reviewed the OARRS report for Cora Solares. I have considered the risks of abuse, dependence, addiction and diversion    Is the patient prescribed a combination of a benzodiazepine and opioid?  No    Last Urine Drug Screen / ordered today: No  No results found for this or any previous visit (from the past 5355  hour(s)).  N/A    Controlled Substance Agreement:  Date of the Last Agreement:       Monitoring and compliance:    ORT:    PDUQ:    Office Agreement:      Review of Systems    ROS:   General: No fevers, chills, weight loss  Skin: Negative for lesions  Eyes: No acute vision changes  Ears: No vertigo  Nose, mouth, throat: No difficulty swallowing or speaking  Respiratory: No cough, shortness of breath, cyanosis  Cardiovascular: Negative for chest pain syncope or palpitation  Gastrointestinal: No constipation, nausea, vomiting  Neurological: Positive for intermittent headache, positive for: paresthesia   Psychological: Negative for severe or debilitating anxiety, depression. Negative memory loss  Musculoskeletal: Positive for arthralgia, myalgia and pain  Endocrine: Negative for weight gain, appetite changes, excessive sweating  Allergy/immune: Negative    All 13 systems were reviewed and are within normal levels except as noted or in the history of present illness.  Positive or pertinent negative responses are noted or were in the history of present illness. As noted, the patient denies significant or impairing weakness in the bilateral upper and lower extremities, medication induced constipation, and bowel or bladder incontinence.     Current Outpatient Medications:     albuterol 90 mcg/actuation aerosol powdr breath activated inhaler, Inhale 2 puffs every 6 hours., Disp: , Rfl:     budesonide-formoteroL (Symbicort) 160-4.5 mcg/actuation inhaler, Inhale 2 puffs 2 times a day., Disp: , Rfl:     butalbital-acetaminophen-caff (Fioricet) -40 mg capsule, Take 1 capsule by mouth every 4 hours., Disp: , Rfl:     nortriptyline (Pamelor) 10 mg capsule, Take 1-2 capsules (10-20 mg) by mouth once daily at bedtime., Disp: 60 capsule, Rfl: 2     Past Medical History:   Diagnosis Date    Acute laryngitis 03/08/2016    Acute laryngitis    Cutaneous abscess of chest wall 05/28/2014    Chest wall abscess    Cutaneous abscess of  left lower limb 10/18/2016    Abscess of left thigh    Cutaneous abscess of right lower limb 08/14/2014    Abscess of right thigh    Noninfective gastroenteritis and colitis, unspecified 10/21/2014    Acute gastroenteritis    Other fecal abnormalities 02/02/2016    Loose stools    Personal history of other diseases of the respiratory system 09/26/2016    History of acute sinusitis    Personal history of other diseases of the respiratory system 01/13/2015    History of influenza    Personal history of other infectious and parasitic diseases 11/30/2015    History of herpes labialis    Personal history of other specified conditions 04/11/2016    History of nausea    Personal history of other specified conditions 11/30/2015    History of dysuria    Personal history of urinary (tract) infections 12/31/2014    History of urinary tract infection    Unspecified abdominal pain 02/26/2016    Abdominal cramping    Unspecified otitis externa, right ear 06/18/2014    Otitis externa of right ear        Past Surgical History:   Procedure Laterality Date    HYSTERECTOMY  05/19/2014    Hysterectomy        Family History   Problem Relation Name Age of Onset    Hypertension Mother      Diabetes Mother      Hypertension Father          Allergies   Allergen Reactions    Magnesium Citrate Other     mouth sores    Penicillins Unknown    Sulfa (Sulfonamide Antibiotics) Unknown    Sulfamethoxazole-Trimethoprim Other        Objective     Visit Vitals  OB Status Hysterectomy   Smoking Status Every Day        Physical Exam    PE:  General: Well-developed, well-nourished, no acute distress. The patient demonstrates no pain behavior, symptom magnification or overt drug-seeking behavior.  Eye: Pupils appropriate for room lighting  Neck/thyroid: No obvious goiter or enlargement of neck noted  Respiratory exam: Normal respiratory effort, unlabored respiration. No accessory muscle use noted  Cardiac exam: Bilateral radial pulses intact  Abdominal:  Nondistended  Spine, cervical: Tenderness to paraspinous musculature paracervical region. Multiple myofascial tender points/muscle tightness upper trapezius muscles on the left.  Mild tenderness over left greater occipital protuberance.  Spine, lumbar: The patient is able to rise from a seated to standing position without hesitancy, push off, or delay. Gait is grossly nonantalgic. No tenderness to paraspinous musculature is noted.  Neurologic exam: Muscle strength is antigravity in all 4 extremities.  Psychiatric exam: Judgment and insight normal, affect normal, speech is fluent, affect appropriate, demonstrating no signs of hypersomnolence, sedation, or confusion        Assessment/Plan   Problem List Items Addressed This Visit             ICD-10-CM    Occipital neuralgia of left side - Primary M54.81     44 year-old female with history of occipital neuralgia responding well to greater occipital nerve blocks.  Patient received significant relief with her most recent greater occipital nerve block.  Currently endorsing greater than 80% pain relief as well as a decrease in headache days.  She does feel that the addition of nortriptyline has been helpful, although she feels the relief is not lasting.  At this point we may increase her nortriptyline to 30 mg at bedtime.  She also continues to utilize ibuprofen 800 sparingly when needed for severe headaches.  Given that she has received significant relief with occipital nerve blocks it would be reasonable to schedule the patient to repeat her greater occipital nerve block in approximately 8 weeks to stay ahead of her pain and provide optimal control of her symptoms.  If at that time the patient is continuing to receive relief from her most recent greater occipital nerve block she may cancel this procedure.  Plan reviewed with patient at today's visit.    - We will increase nortriptyline to 20 mg at bedtime for neuropathic component of pain.  Reviewed potential side  effects with patient.  Patient denies any significant cardiac history or history of arrhythmias.  - We will schedule patient to repeat her left greater occipital nerve block under ultrasound guidance.  Discussed risks and benefits with patient and she agrees to proceed.  Patient will repeat this injection in approximately 8 weeks.  If at that time she is continuing to receive relief from her most recent injection she may cancel this procedure.  - Patient may continue prescription strength ibuprofen 800 mg to take when she has headache at work, however I did review with the patient to minimize the amount of NSAID use due to the potential to cause rebound headaches.    -Follow-up approximately 4 weeks after repeating her greater occipital nerve block.    Disclaimer: This note was transcribed using an audio transcription device.  As such, minor errors may be present with regard to spelling, punctuation, and inadvertent word insertion.  Please disregard such errors.           Relevant Medications    ibuprofen 800 mg tablet    nortriptyline (Pamelor) 10 mg capsule    Other Relevant Orders    [place] pain management    Nerve Block

## 2024-06-17 NOTE — ASSESSMENT & PLAN NOTE
44 year-old female with history of occipital neuralgia responding well to greater occipital nerve blocks.  Patient received significant relief with her most recent greater occipital nerve block.  Currently endorsing greater than 80% pain relief as well as a decrease in headache days.  She does feel that the addition of nortriptyline has been helpful, although she feels the relief is not lasting.  At this point we may increase her nortriptyline to 30 mg at bedtime.  She also continues to utilize ibuprofen 800 sparingly when needed for severe headaches.  Given that she has received significant relief with occipital nerve blocks it would be reasonable to schedule the patient to repeat her greater occipital nerve block in approximately 8 weeks to stay ahead of her pain and provide optimal control of her symptoms.  If at that time the patient is continuing to receive relief from her most recent greater occipital nerve block she may cancel this procedure.  Plan reviewed with patient at today's visit.    - We will increase nortriptyline to 20 mg at bedtime for neuropathic component of pain.  Reviewed potential side effects with patient.  Patient denies any significant cardiac history or history of arrhythmias.  - We will schedule patient to repeat her left greater occipital nerve block under ultrasound guidance.  Discussed risks and benefits with patient and she agrees to proceed.  Patient will repeat this injection in approximately 8 weeks.  If at that time she is continuing to receive relief from her most recent injection she may cancel this procedure.  - Patient may continue prescription strength ibuprofen 800 mg to take when she has headache at work, however I did review with the patient to minimize the amount of NSAID use due to the potential to cause rebound headaches.    -Follow-up approximately 4 weeks after repeating her greater occipital nerve block.    Disclaimer: This note was transcribed using an audio  transcription device.  As such, minor errors may be present with regard to spelling, punctuation, and inadvertent word insertion.  Please disregard such errors.

## 2024-07-21 DIAGNOSIS — M54.81 OCCIPITAL NEURALGIA OF LEFT SIDE: ICD-10-CM

## 2024-07-23 RX ORDER — IBUPROFEN 800 MG/1
TABLET ORAL
Qty: 90 TABLET | Refills: 0 | OUTPATIENT
Start: 2024-07-23

## 2024-08-02 ENCOUNTER — HOSPITAL ENCOUNTER (OUTPATIENT)
Dept: GASTROENTEROLOGY | Facility: HOSPITAL | Age: 44
Discharge: HOME | End: 2024-08-02
Payer: COMMERCIAL

## 2024-08-02 VITALS
RESPIRATION RATE: 16 BRPM | DIASTOLIC BLOOD PRESSURE: 74 MMHG | HEART RATE: 79 BPM | SYSTOLIC BLOOD PRESSURE: 112 MMHG | TEMPERATURE: 97.7 F | OXYGEN SATURATION: 98 %

## 2024-08-02 DIAGNOSIS — M54.81 OCCIPITAL NEURALGIA OF LEFT SIDE: ICD-10-CM

## 2024-08-02 PROCEDURE — 76942 ECHO GUIDE FOR BIOPSY: CPT | Performed by: PHYSICAL MEDICINE & REHABILITATION

## 2024-08-02 PROCEDURE — 2500000005 HC RX 250 GENERAL PHARMACY W/O HCPCS: Performed by: PHYSICAL MEDICINE & REHABILITATION

## 2024-08-02 PROCEDURE — 64405 NJX AA&/STRD GR OCPL NRV: CPT | Mod: LT | Performed by: PHYSICAL MEDICINE & REHABILITATION

## 2024-08-02 PROCEDURE — 2500000004 HC RX 250 GENERAL PHARMACY W/ HCPCS (ALT 636 FOR OP/ED): Performed by: PHYSICAL MEDICINE & REHABILITATION

## 2024-08-02 RX ORDER — METHYLPREDNISOLONE ACETATE 40 MG/ML
INJECTION, SUSPENSION INTRA-ARTICULAR; INTRALESIONAL; INTRAMUSCULAR; SOFT TISSUE AS NEEDED
Status: COMPLETED | OUTPATIENT
Start: 2024-08-02 | End: 2024-08-02

## 2024-08-02 RX ORDER — LIDOCAINE HYDROCHLORIDE 5 MG/ML
INJECTION, SOLUTION INFILTRATION; INTRAVENOUS AS NEEDED
Status: COMPLETED | OUTPATIENT
Start: 2024-08-02 | End: 2024-08-02

## 2024-08-02 RX ORDER — ROPIVACAINE HYDROCHLORIDE 5 MG/ML
INJECTION, SOLUTION EPIDURAL; INFILTRATION; PERINEURAL AS NEEDED
Status: COMPLETED | OUTPATIENT
Start: 2024-08-02 | End: 2024-08-02

## 2024-08-02 ASSESSMENT — PAIN SCALES - GENERAL
PAINLEVEL_OUTOF10: 3
PAINLEVEL_OUTOF10: 7

## 2024-08-02 ASSESSMENT — COLUMBIA-SUICIDE SEVERITY RATING SCALE - C-SSRS
1. IN THE PAST MONTH, HAVE YOU WISHED YOU WERE DEAD OR WISHED YOU COULD GO TO SLEEP AND NOT WAKE UP?: NO
2. HAVE YOU ACTUALLY HAD ANY THOUGHTS OF KILLING YOURSELF?: NO
6. HAVE YOU EVER DONE ANYTHING, STARTED TO DO ANYTHING, OR PREPARED TO DO ANYTHING TO END YOUR LIFE?: NO

## 2024-08-02 ASSESSMENT — PAIN - FUNCTIONAL ASSESSMENT
PAIN_FUNCTIONAL_ASSESSMENT: 0-10
PAIN_FUNCTIONAL_ASSESSMENT: 0-10

## 2024-08-02 NOTE — DISCHARGE INSTRUCTIONS
You had a pain management procedure today.    Observe/ monitor for the following signs & symptoms:  If you notice Excessive bleeding (slow general oozing that completely soaks the dressing, or fresh bright red bleeding).   In either case, apply pressure to the area, elevate it if possible & call your doctor at once.    Also observe for:  Change in color  Numbness/tingling  Coldness to the touch  Swelling  Drainage  Temperature of 101.5 or higher.  Increased, uncontrollable pain.    *If you notice the above signs & symptoms, please call your doctor right away!*      Discharge Instructions:    Your pain may not be gone immediately after this procedure; it generally takes 3 to 5 days for the steroid to work.   Keep the needle site clean & dry for 24 hours.  Continue your present medications.  Make an appointment to see your doctor in 2-3 weeks.  If any problems occur, or if you have any further questions, please call as soon as possible. If you find that you cannot reach your doctor, but feel that the condition nees a doctor's attention, go to the closest emergency department & take this discharge paper with you.       Dr. Dawson's Office: [phone]

## 2024-08-02 NOTE — H&P
History Of Present Illness  Cora Solares is a 44 y.o. female presenting with left occipital neuralgia.     Past Medical History  Past Medical History:   Diagnosis Date    Acute laryngitis 03/08/2016    Acute laryngitis    Cutaneous abscess of chest wall 05/28/2014    Chest wall abscess    Cutaneous abscess of left lower limb 10/18/2016    Abscess of left thigh    Cutaneous abscess of right lower limb 08/14/2014    Abscess of right thigh    Noninfective gastroenteritis and colitis, unspecified 10/21/2014    Acute gastroenteritis    Other fecal abnormalities 02/02/2016    Loose stools    Personal history of other diseases of the respiratory system 09/26/2016    History of acute sinusitis    Personal history of other diseases of the respiratory system 01/13/2015    History of influenza    Personal history of other infectious and parasitic diseases 11/30/2015    History of herpes labialis    Personal history of other specified conditions 04/11/2016    History of nausea    Personal history of other specified conditions 11/30/2015    History of dysuria    Personal history of urinary (tract) infections 12/31/2014    History of urinary tract infection    Unspecified abdominal pain 02/26/2016    Abdominal cramping    Unspecified otitis externa, right ear 06/18/2014    Otitis externa of right ear       Surgical History  Past Surgical History:   Procedure Laterality Date    HYSTERECTOMY  05/19/2014    Hysterectomy        Social History  She reports that she has been smoking cigarettes. She has a 14 pack-year smoking history. She has never used smokeless tobacco. She reports that she does not currently use alcohol. She reports current drug use. Drug: Marijuana.    Family History  Family History   Problem Relation Name Age of Onset    Hypertension Mother      Diabetes Mother      Hypertension Father          Allergies  Magnesium citrate, Penicillins, Sulfa (sulfonamide antibiotics), and  Sulfamethoxazole-trimethoprim    Review of Systems     Physical Exam     Last Recorded Vitals  There were no vitals taken for this visit.    Relevant Results        GENERAL EXAM  Vital Signs: Vital signs to include heart rate, respiration rate, blood pressure, and temperature were reviewed.  General Appearance:  Awake, alert, healthy appearing, well developed, No acute distress.  Head: Normocephalic without evidence of head injury.  Neck: The appearance of the neck was normal without swelling with a midline trachea.  Eyes: The eyelids and eyebrows exhibited no abnormalities.  Pupils were not pin-point.  Sclera was without icterus.  Lungs: Respiration rhythm and depth was normal.  Respiratory movements were normal without labored breathing.  Cardiovascular: No peripheral edema was present.    Neurological: Patient was oriented to time, place, and person.  Speech was normal.  Balance, gait, and stance were unremarkable.    Psychiatric: Appearance was normal with appropriate dress.  Mood was euthymic and affect was normal.  Skin: Affected regions were without ecchymosis or skin lesions.        Physical exam as above except: Tenderness over left occiput     Assessment/Plan   Active Problems:  There are no active Hospital Problems.      Patient here with left occipital neuralgia here for repeat left ultrasound-guided greater occipital nerve block.  No changes since last visit             Hussein Dawson MD

## 2024-08-20 ENCOUNTER — OFFICE VISIT (OUTPATIENT)
Dept: PAIN MEDICINE | Facility: HOSPITAL | Age: 44
End: 2024-08-20
Payer: COMMERCIAL

## 2024-08-20 VITALS
BODY MASS INDEX: 19.82 KG/M2 | OXYGEN SATURATION: 98 % | SYSTOLIC BLOOD PRESSURE: 138 MMHG | DIASTOLIC BLOOD PRESSURE: 93 MMHG | HEIGHT: 58 IN | HEART RATE: 86 BPM | WEIGHT: 94.4 LBS | RESPIRATION RATE: 16 BRPM

## 2024-08-20 DIAGNOSIS — M79.18 MYOFASCIAL PAIN: ICD-10-CM

## 2024-08-20 DIAGNOSIS — M62.838 MUSCLE SPASM: ICD-10-CM

## 2024-08-20 DIAGNOSIS — M54.81 OCCIPITAL NEURALGIA OF LEFT SIDE: ICD-10-CM

## 2024-08-20 DIAGNOSIS — G89.29 CHRONIC NECK PAIN: Primary | ICD-10-CM

## 2024-08-20 DIAGNOSIS — M54.2 CHRONIC NECK PAIN: Primary | ICD-10-CM

## 2024-08-20 PROCEDURE — 99214 OFFICE O/P EST MOD 30 MIN: CPT | Performed by: CLINICAL NURSE SPECIALIST

## 2024-08-20 PROCEDURE — 3008F BODY MASS INDEX DOCD: CPT | Performed by: CLINICAL NURSE SPECIALIST

## 2024-08-20 RX ORDER — TIZANIDINE 2 MG/1
2 TABLET ORAL 2 TIMES DAILY PRN
Qty: 60 TABLET | Refills: 1 | Status: SHIPPED | OUTPATIENT
Start: 2024-08-20 | End: 2024-09-19

## 2024-08-20 RX ORDER — IBUPROFEN 800 MG/1
800 TABLET ORAL EVERY 8 HOURS PRN
Qty: 90 TABLET | Refills: 0 | Status: SHIPPED | OUTPATIENT
Start: 2024-08-20 | End: 2024-09-19

## 2024-08-20 ASSESSMENT — PAIN SCALES - GENERAL: PAINLEVEL: 7

## 2024-08-20 NOTE — ASSESSMENT & PLAN NOTE
44 year-old female with history of occipital neuralgia responding well to greater occipital nerve blocks presents for follow-up.  Patient received significant relief with previous greater occipital nerve blocks.  Presents today for follow-up after repeating her greater occipital nerve block on 8/2/2024.  Patient states that she did receive moderate relief of left-sided occipital pain, however, she is noting symptoms returning and has noted an increase in right sided occipital pain.  Also experiencing cervical pain with radiation to the base of her skull as well as into bilateral shoulders.  She does experience some numbness and tingling to her shoulders as well as noting an increase in headaches.  Pain accompanied by muscle tightness and spasms upper trapezius muscles bilaterally.  Exam consistent with a degree of cervical radicular symptoms, myofascial pain/muscle tightness as well as occipital neuralgia.  At this point it would be reasonable to proceed with baseline cervical imaging in the form of a cervical x-ray and send the patient for formal course of physical therapy to include dry needling for myofascial pain and muscle tightness.  Patient may require further imaging in the form of a cervical MRI depending on results of physical therapy.  She may be a candidate for additional injections in the future.  As far as medication, the patient does feel that the increased dose of nortriptyline 30 mg at bedtime has been helpful.  She continues to utilize ibuprofen 800 mg as needed.  Recommended that she limit use of oral NSAIDs secondary to the potential for rebound headaches.  Advised the patient to supplement with Tylenol limiting her total Tylenol dose to no more than 3000 mg in 24 hours.  At this time we will also add a low-dose of tizanidine to help with muscle tightness and spasms. Plan reviewed with patient at today's visit.    -Sending patient for baseline cervical x-ray.  -Sending for a formal course of  physical therapy targeting cervical radicular symptoms as well as myofascial pain/muscle tightness.  Physical therapy to include dry needling.  -Continue nortriptyline to 30 mg at bedtime for neuropathic component of pain.  Reviewed potential side effects with patient.  Patient denies any significant cardiac history or history of arrhythmias.  - Patient may continue prescription strength ibuprofen 800 mg to take when she has headache at work, however I did review with the patient to minimize the amount of NSAID use due to the potential to cause rebound headaches.  Reviewed most recent CMP with adequate renal function.  -Recommended she supplement with Tylenol limiting her total Tylenol dose to less than 3000 mg in 24 hours.  -Tizanidine 2 mg up to 2 times per day as needed for muscle tightness and spasm.  Reviewed potential side effects with patient.  Most recent CMP with normal liver function.  -Follow-up after completing 6 to 8 weeks of physical therapy.  If patient does not obtain significant relief with physical therapy she may benefit from a cervical MRI.      Disclaimer: This note was transcribed using an audio transcription device.  As such, minor errors may be present with regard to spelling, punctuation, and inadvertent word insertion.  Please disregard such errors.

## 2024-08-26 ENCOUNTER — HOSPITAL ENCOUNTER (OUTPATIENT)
Dept: RADIOLOGY | Facility: HOSPITAL | Age: 44
Discharge: HOME | End: 2024-08-26
Payer: COMMERCIAL

## 2024-08-26 DIAGNOSIS — G89.29 CHRONIC NECK PAIN: ICD-10-CM

## 2024-08-26 DIAGNOSIS — M54.2 CHRONIC NECK PAIN: ICD-10-CM

## 2024-08-26 PROCEDURE — 72040 X-RAY EXAM NECK SPINE 2-3 VW: CPT | Performed by: RADIOLOGY

## 2024-08-26 PROCEDURE — 72040 X-RAY EXAM NECK SPINE 2-3 VW: CPT

## 2024-09-03 ENCOUNTER — TELEPHONE (OUTPATIENT)
Dept: PAIN MEDICINE | Facility: HOSPITAL | Age: 44
End: 2024-09-03
Payer: COMMERCIAL

## 2024-09-03 NOTE — TELEPHONE ENCOUNTER
Patient's x-ray shows some degree of degenerative changes with no acute fractures.  It would be okay for patient to proceed with a formal course of physical therapy.

## 2024-09-03 NOTE — TELEPHONE ENCOUNTER
What would you like the pt told regarding x-ray results and do you want to send her for PT?  Her follow up appointment isn't until 9/26/24.  Thank you!

## 2024-09-04 NOTE — TELEPHONE ENCOUNTER
Called and relayed above to pt by leaving a message on her VM.  Advised pt to call the office if she has any additional questions.  Olena Sarmiento RN

## 2024-09-09 ENCOUNTER — APPOINTMENT (OUTPATIENT)
Dept: PRIMARY CARE | Facility: CLINIC | Age: 44
End: 2024-09-09
Payer: COMMERCIAL

## 2024-09-09 VITALS
DIASTOLIC BLOOD PRESSURE: 88 MMHG | SYSTOLIC BLOOD PRESSURE: 146 MMHG | RESPIRATION RATE: 16 BRPM | TEMPERATURE: 98.1 F | HEART RATE: 52 BPM | WEIGHT: 97.2 LBS | OXYGEN SATURATION: 96 % | HEIGHT: 58 IN | BODY MASS INDEX: 20.4 KG/M2

## 2024-09-09 DIAGNOSIS — Z90.710 STATUS POST HYSTERECTOMY: ICD-10-CM

## 2024-09-09 DIAGNOSIS — G89.29 CHRONIC NECK PAIN: ICD-10-CM

## 2024-09-09 DIAGNOSIS — Z12.31 VISIT FOR SCREENING MAMMOGRAM: Primary | ICD-10-CM

## 2024-09-09 DIAGNOSIS — R59.0 POSTERIOR CERVICAL ADENOPATHY: ICD-10-CM

## 2024-09-09 DIAGNOSIS — M54.50 CHRONIC LOW BACK PAIN, UNSPECIFIED BACK PAIN LATERALITY, UNSPECIFIED WHETHER SCIATICA PRESENT: ICD-10-CM

## 2024-09-09 DIAGNOSIS — G89.29 CHRONIC LOW BACK PAIN, UNSPECIFIED BACK PAIN LATERALITY, UNSPECIFIED WHETHER SCIATICA PRESENT: ICD-10-CM

## 2024-09-09 DIAGNOSIS — Z13.220 LIPID SCREENING: ICD-10-CM

## 2024-09-09 DIAGNOSIS — M54.81 OCCIPITAL NEURALGIA OF LEFT SIDE: ICD-10-CM

## 2024-09-09 DIAGNOSIS — M54.2 CHRONIC NECK PAIN: ICD-10-CM

## 2024-09-09 DIAGNOSIS — J45.909 REACTIVE AIRWAY DISEASE WITHOUT COMPLICATION, UNSPECIFIED ASTHMA SEVERITY, UNSPECIFIED WHETHER PERSISTENT (HHS-HCC): ICD-10-CM

## 2024-09-09 PROCEDURE — 99214 OFFICE O/P EST MOD 30 MIN: CPT | Performed by: INTERNAL MEDICINE

## 2024-09-09 PROCEDURE — 3008F BODY MASS INDEX DOCD: CPT | Performed by: INTERNAL MEDICINE

## 2024-09-09 PROCEDURE — 4004F PT TOBACCO SCREEN RCVD TLK: CPT | Performed by: INTERNAL MEDICINE

## 2024-09-09 SDOH — ECONOMIC STABILITY: FOOD INSECURITY: WITHIN THE PAST 12 MONTHS, THE FOOD YOU BOUGHT JUST DIDN'T LAST AND YOU DIDN'T HAVE MONEY TO GET MORE.: NEVER TRUE

## 2024-09-09 SDOH — ECONOMIC STABILITY: FOOD INSECURITY: WITHIN THE PAST 12 MONTHS, YOU WORRIED THAT YOUR FOOD WOULD RUN OUT BEFORE YOU GOT MONEY TO BUY MORE.: NEVER TRUE

## 2024-09-09 ASSESSMENT — LIFESTYLE VARIABLES
HOW OFTEN DO YOU HAVE A DRINK CONTAINING ALCOHOL: NEVER
AUDIT-C TOTAL SCORE: 0
HOW MANY STANDARD DRINKS CONTAINING ALCOHOL DO YOU HAVE ON A TYPICAL DAY: PATIENT DOES NOT DRINK
SKIP TO QUESTIONS 9-10: 1
HOW OFTEN DO YOU HAVE SIX OR MORE DRINKS ON ONE OCCASION: NEVER

## 2024-09-09 ASSESSMENT — PATIENT HEALTH QUESTIONNAIRE - PHQ9
2. FEELING DOWN, DEPRESSED OR HOPELESS: NOT AT ALL
SUM OF ALL RESPONSES TO PHQ9 QUESTIONS 1 & 2: 0
1. LITTLE INTEREST OR PLEASURE IN DOING THINGS: NOT AT ALL

## 2024-09-09 NOTE — LETTER
September 9, 2024     Patient: Cora Solares   YOB: 1980   Date of Visit: 9/9/2024       To Whom It May Concern:    Cora Solares was seen in my clinic on 9/9/2024 at 1:30 pm. Please excuse Cora for her absence from work on this day to make the appointment, patient may return to work on 9/11/2024 after physical therapy is completed  .    If you have any questions or concerns, please don't hesitate to call.         Sincerely,         Enoc Mott MD        CC: No Recipients

## 2024-09-09 NOTE — ASSESSMENT & PLAN NOTE
Patient is a long time smoker, she may have a component of COPD also, recommend smoking cessation now

## 2024-09-09 NOTE — PROGRESS NOTES
"Chief Complaint/HPI:    Initial visit with me: patient was seen in this office many years ago, patient states.     Chronic neck pain: patient sees pain management. X ray was completed.  Patient is having ongoing neck issues. She is being treated for occipital neuralgia, patient has had ongoing symptoms since 2021. Patient had x rays completed, she has developed a \"lump\" on the right side of the neck. She used Nex Wave for pain management.  She has ongoing pain when patient looks up and down, this is causing ongoing issues at work. Patient had x rays completed, she has been ordered dry needling procedure.     Patient is s/p hysterectomy        ROS otherwise negative aside from what was mentioned above in HPI.      Patient Active Problem List   Diagnosis    Depression    Cough    Constipation    Chronic neck pain    Reactive airway disease (HHS-HCC)    Asthma (HHS-HCC)    Allergic rhinitis    Elevated MCV    Endometriosis    Drug dependence (Multi)    Abscess    External hemorrhoids    Hand crush injury, left, initial encounter    Herpes labialis    Infected cyst of skin    Opiate dependence, continuous (Multi)    Occipital neuralgia of left side    Lower abdominal pain    Low back pain    Left breast lump    Insomnia    Opioid abuse, episodic (Multi)    Opioid type dependence in remission (Multi)    Right hip pain    Right anterior knee pain    Visit for screening mammogram    Posterior cervical adenopathy         Past Medical History:   Diagnosis Date    Acute laryngitis 03/08/2016    Acute laryngitis    Asthma (HHS-HCC)     Cutaneous abscess of chest wall 05/28/2014    Chest wall abscess    Cutaneous abscess of left lower limb 10/18/2016    Abscess of left thigh    Cutaneous abscess of right lower limb 08/14/2014    Abscess of right thigh    Headache 12/2021    Noninfective gastroenteritis and colitis, unspecified 10/21/2014    Acute gastroenteritis    Other fecal abnormalities 02/02/2016    Loose stools    " "Personal history of other diseases of the respiratory system 09/26/2016    History of acute sinusitis    Personal history of other diseases of the respiratory system 01/13/2015    History of influenza    Personal history of other infectious and parasitic diseases 11/30/2015    History of herpes labialis    Personal history of other specified conditions 04/11/2016    History of nausea    Personal history of other specified conditions 11/30/2015    History of dysuria    Personal history of urinary (tract) infections 12/31/2014    History of urinary tract infection    Unspecified abdominal pain 02/26/2016    Abdominal cramping    Unspecified otitis externa, right ear 06/18/2014    Otitis externa of right ear     Past Surgical History:   Procedure Laterality Date    HYSTERECTOMY  05/19/2014    Hysterectomy     Social History     Social History Narrative    Not on file         ALLERGIES  Magnesium citrate, Penicillins, Sulfa (sulfonamide antibiotics), and Sulfamethoxazole-trimethoprim      MEDICATIONS  Current Outpatient Medications on File Prior to Visit   Medication Sig Dispense Refill    albuterol 90 mcg/actuation aerosol powdr breath activated inhaler Inhale 2 puffs every 6 hours.      budesonide-formoteroL (Symbicort) 160-4.5 mcg/actuation inhaler Inhale 2 puffs 2 times a day.      ibuprofen 800 mg tablet Take 1 tablet (800 mg) by mouth every 8 hours if needed for moderate pain (4 - 6). 90 tablet 0    nortriptyline (Pamelor) 10 mg capsule Take 2-3 capsules (20-30 mg) by mouth once daily at bedtime. 90 capsule 2    tiZANidine (Zanaflex) 2 mg tablet Take 1 tablet (2 mg) by mouth 2 times a day as needed for muscle spasms. 60 tablet 1     No current facility-administered medications on file prior to visit.         PHYSICAL EXAM  /88 (BP Location: Right arm, Patient Position: Sitting, BP Cuff Size: Adult)   Pulse 52   Temp 36.7 °C (98.1 °F)   Resp 16   Ht 1.461 m (4' 9.5\")   Wt (!) 44.1 kg (97 lb 3.2 oz)   " SpO2 96%   BMI 20.67 kg/m²   Body mass index is 20.67 kg/m².    Gen: Alert, NAD, wearing glasses  HEENT:  EOMI, conjunctiva and sclera normal in appearance, no thyromegaly, tender posterior /lateral right neck is noted, questionable posterior cervical adenopathy noted in the right neck, the area is tender to palpation  Respiratory:  Lungs CTAB, slightly diminished breath sounds throughout  Cardiovascular:  Heart RRR. No M/R/G, no peripheral edema noted  Neuro:  Gross motor and sensory intact  Skin:  No suspicious lesions present    ASSESSMENT/PLAN  Problem List Items Addressed This Visit       Chronic neck pain    Relevant Orders    US head neck soft tissue    Albumin-Creatinine Ratio, Urine Random    CBC and Auto Differential    Comprehensive Metabolic Panel    TSH with reflex to Free T4 if abnormal    Low back pain    Relevant Orders    CBC and Auto Differential    Comprehensive Metabolic Panel    Occipital neuralgia of left side    Relevant Orders    CBC and Auto Differential    Comprehensive Metabolic Panel    Posterior cervical adenopathy    Relevant Orders    US head neck soft tissue    Albumin-Creatinine Ratio, Urine Random    CBC and Auto Differential    Comprehensive Metabolic Panel    TSH with reflex to Free T4 if abnormal    Reactive airway disease (HHS-HCC)    Current Assessment & Plan     Patient is a long time smoker, she may have a component of COPD also, recommend smoking cessation now         Relevant Orders    CBC and Auto Differential    Comprehensive Metabolic Panel    Visit for screening mammogram - Primary    Relevant Orders    BI mammo bilateral screening tomosynthesis    CBC and Auto Differential    Comprehensive Metabolic Panel     Other Visit Diagnoses       Lipid screening        Relevant Orders    Lipid Panel    Status post hysterectomy        Relevant Orders    Vitamin D 25-Hydroxy,Total (for eval of Vitamin D levels)          Check labs, check an US of the neck, get screening  mammograms, encourage smoking cessation. See pain management for FMLA paperwork since this is the patient's initial visit.  Follow up after testing.     Enoc Mott MD

## 2024-09-09 NOTE — ASSESSMENT & PLAN NOTE
Questionable posterior cervical adenopathy, check an US of soft tissue of neck, check a CBC , CMP and TSH, since neck pain is progressing, she may be a candidate for an MRI in the future, check the US first.

## 2024-09-09 NOTE — LETTER
September 9, 2024     Patient: Cora Solares   YOB: 1980   Date of Visit: 9/9/2024       To Whom It May Concern:    Cora Solares was seen in my clinic on 9/9/2024 at 1:30 pm. Please excuse Cora for her absence from work on this day to make the appointment, she may return to work on 9/11/2024 after physical therapy is completed.    If you have any questions or concerns, please don't hesitate to call.         Sincerely,         Enoc Mott MD        CC: No Recipients

## 2024-09-10 ENCOUNTER — TELEPHONE (OUTPATIENT)
Dept: PRIMARY CARE | Facility: CLINIC | Age: 44
End: 2024-09-10
Payer: COMMERCIAL

## 2024-09-10 ENCOUNTER — EVALUATION (OUTPATIENT)
Dept: PHYSICAL THERAPY | Facility: HOSPITAL | Age: 44
End: 2024-09-10
Payer: COMMERCIAL

## 2024-09-10 DIAGNOSIS — M79.18 MYOFASCIAL PAIN: ICD-10-CM

## 2024-09-10 DIAGNOSIS — M54.81 OCCIPITAL NEURALGIA OF LEFT SIDE: Primary | ICD-10-CM

## 2024-09-10 DIAGNOSIS — G89.29 CHRONIC NECK PAIN: ICD-10-CM

## 2024-09-10 DIAGNOSIS — M54.2 CHRONIC NECK PAIN: ICD-10-CM

## 2024-09-10 PROCEDURE — 97161 PT EVAL LOW COMPLEX 20 MIN: CPT | Mod: GP

## 2024-09-10 ASSESSMENT — PAIN - FUNCTIONAL ASSESSMENT: PAIN_FUNCTIONAL_ASSESSMENT: 0-10

## 2024-09-10 ASSESSMENT — ENCOUNTER SYMPTOMS
DEPRESSION: 0
OCCASIONAL FEELINGS OF UNSTEADINESS: 0
LOSS OF SENSATION IN FEET: 0

## 2024-09-10 ASSESSMENT — PAIN SCALES - GENERAL: PAINLEVEL_OUTOF10: 5 - MODERATE PAIN

## 2024-09-10 NOTE — PROGRESS NOTES
Physical Therapy Evaluation    Patient Name: Cora Solares  MRN: [id number]  : 1980   Today's Date: 9/10/2024  Time Calculation  Start Time: 803  Stop Time: 844  Time Calculation (min): 41 min  PT Evaluation Time Entry  PT Evaluation (Low) Time Entry: 41        Visit #    1    Current Problem  1. Occipital neuralgia of left side  Referral to Physical Therapy    Follow Up In Physical Therapy      2. Chronic neck pain  Referral to Physical Therapy    Follow Up In Physical Therapy      3. Myofascial pain  Referral to Physical Therapy    Follow Up In Physical Therapy          Subjective   General:  General  Reason for Referral: Neck Pain  Referred By: STACY Kc CNP  Past Medical History Relevant to Rehab: [place], LBP  Preferred Learning Style: visual, kinesthetic  Pt is a 43 yo female that report onset of neck pain in Dec of 2021 post covid, diagnosed and treated for occipital neuralgia.  States that injections did not help and symptoms continued  getting worse.  Finally had x-rays on  showing retrolisthesis at c4 on 5 and degenerative changes through out the C spine.   Pt reports having pain and tightness DANIEL.  Pt c/o tingling into the left arm and shoulder blade. Pain goes into shoulder blade but not arm. Pt states that MD wrote order for DN    Precautions:  Precautions  STEADI Fall Risk Score (The score of 4 or more indicates an increased risk of falling): 2    Pain:  Pain Assessment: 0-10  0-10 (Numeric) Pain Score: 5 - Moderate pain  Pain Type: Chronic pain  Pain Location: Neck  Pain Orientation: Right, Left  Pain Radiating Towards: left shoulder blade  Pain Frequency: Constant/continuous   Current: 5/10   Best: 2/10   Worst: 10/10  Decreases: TENS, HP, CP, IBP, tylenol,   Increase; Positioning, lifting, reaching overhead          Objective   Sensation:   Decreased sensation to light touch through out left UE    Range of Motion:     Cervical     Flexion 21    Extension 16    SB R= 14  L= 20     Rotation R= 35  L= 38       Shoulder     Flexion R= 140  L= 126     Strength:   Measured with seated MMT'ing, grossly WNL's with exception of those listed below   Left shoulder flexion 4-/5     Palpation:   Increase tone and increased pain/tenderness through out C-T region    Special Tests:   Spurlings:   Compression: Neg DANIEL   Distraction: Neg -  feels better       Outcome Measures:  Neck Disability Index: 28/50     Treatment:                                         Date: 9/10                                           VISIT# #1 #    Eval         Pulleys               Stretches:               Isometrics                    Chin Tucks     Scap Retraction          T-band:      Mid Row      Pul Down     Manual:          Modalities:          HEP        OP EDUCATION:  Outpatient Education  Individual(s) Educated: Patient  Education Provided: Physiology, POC, Posture  Risk and Benefits Discussed with Patient/Caregiver/Other: yes  Patient/Caregiver Demonstrated Understanding: yes  Plan of Care Discussed and Agreed Upon: yes  Patient Response to Education: Patient/Caregiver Asked Appropriate Questions, Patient/Caregiver Performed Return Demonstration of Exercises/Activities, Patient/Caregiver Verbalized Understanding of Information  Assessment   PT Assessment  PT Assessment Results: Decreased strength, Decreased range of motion, Decreased mobility, Impaired sensation, Impaired tone, Pain  Rehab Prognosis: Good  Evaluation/Treatment Tolerance: Patient limited by pain      Plan  Treatment/Interventions: Cryotherapy, Dry needling, Education/ Instruction, Electrical stimulation, Manual therapy, Mechanical traction, Neuromuscular re-education, Ultrasound, Therapeutic exercises, Therapeutic activities  PT Plan: Skilled PT  PT Frequency: 2 times per week  Duration: 6-8 weeks  Rehab Potential: Good  Plan of Care Agreement: Patient    Goals:  Active       Neck Pain        1. Pt will demonstrate independence with HEP to reinforce  gains made in treatment        Start:  09/10/24    Expected End:  10/06/24            2. Pt will report having a decrease in pain to 7/10 at its worst for decreased sleep disruption and increased concentration        Start:  09/10/24    Expected End:  10/06/24            3.  Pt will have an increase in cervical rotation for increased safety with driving       Start:  09/10/24    Expected End:  10/27/24            4. Pt will have an increase in C-T strength by 1/3 MMT grade to improve posture and shoulder flexion        Start:  09/10/24    Expected End:  10/27/24            5. Pt will have an improvement of NDI score to 22 to indicate and increase in functional activity tolerance       Start:  09/10/24    Expected End:  10/27/24

## 2024-09-10 NOTE — TELEPHONE ENCOUNTER
Pt was seen yesterday and had brought in [facility] paperwork.  Pt was told to take it to Pain Management and have them fill out the paperwork.  Pain Management said that her PCP needs to sign the [facility] paperwork.  Pt needs the paperwork filled out and signed so she can make her appts or she will have to go back to work because she cannot afford to not work.  Please advise

## 2024-09-16 ENCOUNTER — TREATMENT (OUTPATIENT)
Dept: PHYSICAL THERAPY | Facility: HOSPITAL | Age: 44
End: 2024-09-16
Payer: COMMERCIAL

## 2024-09-16 DIAGNOSIS — M54.81 OCCIPITAL NEURALGIA OF LEFT SIDE: Primary | ICD-10-CM

## 2024-09-16 DIAGNOSIS — M79.18 MYOFASCIAL PAIN: ICD-10-CM

## 2024-09-16 DIAGNOSIS — M54.2 CHRONIC NECK PAIN: ICD-10-CM

## 2024-09-16 DIAGNOSIS — G89.29 CHRONIC NECK PAIN: ICD-10-CM

## 2024-09-16 DIAGNOSIS — M54.81 OCCIPITAL NEURALGIA OF LEFT SIDE: ICD-10-CM

## 2024-09-16 PROCEDURE — 97140 MANUAL THERAPY 1/> REGIONS: CPT | Mod: GP | Performed by: PHYSICAL THERAPIST

## 2024-09-16 PROCEDURE — 20560 NDL INSJ W/O NJX 1 OR 2 MUSC: CPT | Mod: GP | Performed by: PHYSICAL THERAPIST

## 2024-09-16 RX ORDER — IBUPROFEN 800 MG/1
TABLET ORAL
Qty: 90 TABLET | Refills: 0 | OUTPATIENT
Start: 2024-09-16

## 2024-09-16 ASSESSMENT — PAIN - FUNCTIONAL ASSESSMENT: PAIN_FUNCTIONAL_ASSESSMENT: 0-10

## 2024-09-16 ASSESSMENT — PAIN SCALES - GENERAL: PAINLEVEL_OUTOF10: 7

## 2024-09-16 NOTE — PROGRESS NOTES
"Physical Therapy    Physical Therapy Treatment    Patient Name: Cora Solares  MRN: [id number]  : 1980   Today's Date: 2024  Time Calculation  Start Time: 1355  Stop Time: 1435  Time Calculation (min): 40 min  PT Therapeutic Procedures Time Entry  Manual Therapy Time Entry: 23  Needle Insertion w/o Injection 1 or 2: 17           Visit Number: 2  Auth: 90 days    Current Problem  Problem List Items Addressed This Visit             ICD-10-CM    Chronic neck pain M54.2, G89.29    Occipital neuralgia of left side - Primary M54.81    Myofascial pain M79.18        Subjective   Patient reports that her left neck and shoulder are still very tight and painful. She notes that her job requires her to reach overhead all day long (because she is short), and this probably contributes to her pain.      Precautions  Precautions  STEADI Fall Risk Score (The score of 4 or more indicates an increased risk of falling): No change    Pain  Pain Assessment: 0-10  0-10 (Numeric) Pain Score: 7  Pain Type: Chronic pain  Pain Location: Neck  Pain Orientation: Left  Pain Radiating Towards: left shoulder    Objective   Cervical ROM: left rotation tight.    Treatments:                                         Date: 9/10 2024                                          VISIT# #1 #2    Eval         Pulleys               Stretches:     Upper Trap     Levator     Scalene          Isometrics                    Chin Tucks     Scap Retraction          T-band:      Mid Row      Pul Down          Manual:     Contract-relax UT stretch  Left, 20\" x 3   Suboccipital release  10'        Modalities:          Dry Needling     # Count in  17   Suboccipital  1/2\" x 4   Cervical paraspinals  1\" x 10   UT left  1\" x 2   Supraspinatus left  2\" x 1   Levator     # Count Out  17        HEP       Assessment:   Patient reports 4 pain after treatment today. Patient also noted that the neck and upper trap felt a little more tight after " treatment.    Plan:  Monitor response to dry needling and contract relax.     OP PT Plan  Treatment/Interventions: Cryotherapy, Dry needling, Education/ Instruction, Electrical stimulation, Manual therapy, Mechanical traction, Neuromuscular re-education, Ultrasound, Therapeutic exercises, Therapeutic activities  PT Plan: Skilled PT  PT Frequency: 2 times per week  Duration: 6-8 weeks  Rehab Potential: Good  Plan of Care Agreement: Patient    Goals:  Active       Neck Pain        1. Pt will demonstrate independence with HEP to reinforce gains made in treatment        Start:  09/10/24    Expected End:  10/06/24            2. Pt will report having a decrease in pain to 7/10 at its worst for decreased sleep disruption and increased concentration        Start:  09/10/24    Expected End:  10/06/24            3.  Pt will have an increase in cervical rotation for increased safety with driving       Start:  09/10/24    Expected End:  10/27/24            4. Pt will have an increase in C-T strength by 1/3 MMT grade to improve posture and shoulder flexion        Start:  09/10/24    Expected End:  10/27/24            5. Pt will have an improvement of NDI score to 22 to indicate and increase in functional activity tolerance       Start:  09/10/24    Expected End:  10/27/24

## 2024-09-18 ENCOUNTER — TREATMENT (OUTPATIENT)
Dept: PHYSICAL THERAPY | Facility: HOSPITAL | Age: 44
End: 2024-09-18
Payer: COMMERCIAL

## 2024-09-18 DIAGNOSIS — M54.2 CHRONIC NECK PAIN: ICD-10-CM

## 2024-09-18 DIAGNOSIS — M54.81 OCCIPITAL NEURALGIA OF LEFT SIDE: ICD-10-CM

## 2024-09-18 DIAGNOSIS — M79.18 MYOFASCIAL PAIN: ICD-10-CM

## 2024-09-18 DIAGNOSIS — G89.29 CHRONIC NECK PAIN: ICD-10-CM

## 2024-09-18 PROCEDURE — 97140 MANUAL THERAPY 1/> REGIONS: CPT | Mod: GP | Performed by: PHYSICAL THERAPIST

## 2024-09-18 PROCEDURE — 20560 NDL INSJ W/O NJX 1 OR 2 MUSC: CPT | Mod: GP | Performed by: PHYSICAL THERAPIST

## 2024-09-18 NOTE — PROGRESS NOTES
Physical Therapy    Physical Therapy    Physical Therapy Treatment      Patient Name: Cora Solares  MRN: [id number]  Today's Date: 2024  Visit # 3  Time Calculation  Start Time: 315  Stop Time: 400  Time Calculation (min): 45 min      Subjective    Worked 10 hours today and pain is 6/10 left neck , left shoulder, jaw, not in fingers today.  Left shoulder pain with elevation / tired feeling     Patient reports that her left neck and shoulder are still very tight and painful. She notes that her job requires her to reach overhead all day long (because she is short), and this probably contributes to her pain , she is  putting tape up high at work.  Pt will discuss ergonomics  With employer         Precautions  Precautions  STEADI Fall Risk Score (The score of 4 or more indicates an increased risk of falling): no falls    Current Problem:   1. Chronic neck pain  Follow Up In Physical Therapy      2. Occipital neuralgia of left side  Follow Up In Physical Therapy      3. Myofascial pain  Follow Up In Physical Therapy          Pain:         Objective        Name and  confirmed    Dry needle tx done today by Kai GARZA   Pt states she Has home tens unit  Added supine chin tuck ex gentle  Left shoulder pain with elevation / tired feeling, declined doing ex   Carries heavy bag - advise to lighten her load as able  Sleep position and posture - educated in alignment  High stress - advise to find methods to relax and breathe . Pt high stress increases tension in upper trap and neck pain    HPI: Pt is a 43 yo female that report onset of neck pain in Dec of 2021 post covid, diagnosed and treated for occipital neuralgia.  States that injections did not help and symptoms continued  getting worse.  Finally had x-rays on  showing retrolisthesis at c4 on 5 and degenerative changes through out the C spine.   Pt reports having pain and tightness DANIEL.  Pt c/o tingling into the left arm and shoulder blade. Pain goes  "into shoulder blade but not arm. Pt states that MD wrote order for DN       Palpation:   Increase tone and increased pain/tenderness through out C-T region    Treatments:  Cervical ROM: left rotation tight.     Treatments:                                         Date: 9/10 9/16/2024 09/18/24                                          VISIT# #1 #2 #3     Eval               Pulleys                          Stretches:        Upper Trap        Levator        Scalene                 Isometrics                                   Chin Tucks     Supine   10 x    3\"H   Scap Retraction                 T-band:         Mid Row     declined    Pul Down                 Manual:        Contract-relax UT stretch   Left, 20\" x 3 Left ,   20\" x 3   Suboccipital release   10' 10'            Modalities:                 Dry Needling        # Count in   17 17   Suboccipital   1/2\" x 4 1/2 \" x 4   Cervical paraspinals   1\" x 10 1\" x 10    UT left   1\" x 2 1\" x 2   Supraspinatus left   2\" x 1 2\" x 1   Levator        Greater occipital nerve   1/2 x 1 ea   # Count Out   17 17            HEP             Outpatient Education  Individual(s) Educated: Patient  Education Provided: Physiology, POC, Posture  Assessment:    Patient reports  pain after treatment today no change. . Patient also noted that the neck and upper trap felt a little more tight after treatment     Plan:   Monitor response to dry needling and contract relax.   Pt is Scheduled for Fri sept 20th for US, mammogram and blood work. Plan for PT  to assess results of findings in EMR once this is done to determine if plan of care needs to modified     Goals:  Active       Neck Pain        1. Pt will demonstrate independence with HEP to reinforce gains made in treatment        Start:  09/10/24    Expected End:  10/06/24            2. Pt will report having a decrease in pain to 7/10 at its worst for decreased sleep disruption and increased concentration        Start:  09/10/24    Expected " End:  10/06/24            3.  Pt will have an increase in cervical rotation for increased safety with driving       Start:  09/10/24    Expected End:  10/27/24            4. Pt will have an increase in C-T strength by 1/3 MMT grade to improve posture and shoulder flexion        Start:  09/10/24    Expected End:  10/27/24            5. Pt will have an improvement of NDI score to 22 to indicate and increase in functional activity tolerance       Start:  09/10/24    Expected End:  10/27/24

## 2024-09-20 ENCOUNTER — LAB (OUTPATIENT)
Dept: LAB | Facility: LAB | Age: 44
End: 2024-09-20
Payer: COMMERCIAL

## 2024-09-20 ENCOUNTER — HOSPITAL ENCOUNTER (OUTPATIENT)
Dept: RADIOLOGY | Facility: HOSPITAL | Age: 44
Discharge: HOME | End: 2024-09-20
Payer: COMMERCIAL

## 2024-09-20 DIAGNOSIS — G89.29 CHRONIC NECK PAIN: ICD-10-CM

## 2024-09-20 DIAGNOSIS — G89.29 CHRONIC LOW BACK PAIN, UNSPECIFIED BACK PAIN LATERALITY, UNSPECIFIED WHETHER SCIATICA PRESENT: ICD-10-CM

## 2024-09-20 DIAGNOSIS — M54.50 CHRONIC LOW BACK PAIN, UNSPECIFIED BACK PAIN LATERALITY, UNSPECIFIED WHETHER SCIATICA PRESENT: ICD-10-CM

## 2024-09-20 DIAGNOSIS — R59.0 POSTERIOR CERVICAL ADENOPATHY: ICD-10-CM

## 2024-09-20 DIAGNOSIS — Z90.710 STATUS POST HYSTERECTOMY: ICD-10-CM

## 2024-09-20 DIAGNOSIS — M54.81 OCCIPITAL NEURALGIA OF LEFT SIDE: ICD-10-CM

## 2024-09-20 DIAGNOSIS — Z13.220 LIPID SCREENING: ICD-10-CM

## 2024-09-20 DIAGNOSIS — M54.2 CHRONIC NECK PAIN: ICD-10-CM

## 2024-09-20 DIAGNOSIS — J45.909 REACTIVE AIRWAY DISEASE WITHOUT COMPLICATION, UNSPECIFIED ASTHMA SEVERITY, UNSPECIFIED WHETHER PERSISTENT (HHS-HCC): ICD-10-CM

## 2024-09-20 DIAGNOSIS — Z12.31 VISIT FOR SCREENING MAMMOGRAM: ICD-10-CM

## 2024-09-20 LAB
25(OH)D3 SERPL-MCNC: 17 NG/ML (ref 30–100)
ALBUMIN SERPL BCP-MCNC: 3.8 G/DL (ref 3.4–5)
ALP SERPL-CCNC: 64 U/L (ref 33–110)
ALT SERPL W P-5'-P-CCNC: 13 U/L (ref 7–45)
ANION GAP SERPL CALC-SCNC: 11 MMOL/L (ref 10–20)
AST SERPL W P-5'-P-CCNC: 16 U/L (ref 9–39)
BASOPHILS # BLD AUTO: 0.07 X10*3/UL (ref 0–0.1)
BASOPHILS NFR BLD AUTO: 1 %
BILIRUB SERPL-MCNC: 0.4 MG/DL (ref 0–1.2)
BUN SERPL-MCNC: 17 MG/DL (ref 6–23)
CALCIUM SERPL-MCNC: 8 MG/DL (ref 8.6–10.3)
CHLORIDE SERPL-SCNC: 107 MMOL/L (ref 98–107)
CHOLEST SERPL-MCNC: 168 MG/DL (ref 0–199)
CHOLESTEROL/HDL RATIO: 2.4
CO2 SERPL-SCNC: 24 MMOL/L (ref 21–32)
CREAT SERPL-MCNC: 0.62 MG/DL (ref 0.5–1.05)
CREAT UR-MCNC: 154.9 MG/DL (ref 20–320)
EGFRCR SERPLBLD CKD-EPI 2021: >90 ML/MIN/1.73M*2
EOSINOPHIL # BLD AUTO: 0.35 X10*3/UL (ref 0–0.7)
EOSINOPHIL NFR BLD AUTO: 4.8 %
ERYTHROCYTE [DISTWIDTH] IN BLOOD BY AUTOMATED COUNT: 13.2 % (ref 11.5–14.5)
GLUCOSE SERPL-MCNC: 123 MG/DL (ref 74–99)
HCT VFR BLD AUTO: 34.6 % (ref 36–46)
HDLC SERPL-MCNC: 70.6 MG/DL
HGB BLD-MCNC: 11.2 G/DL (ref 12–16)
IMM GRANULOCYTES # BLD AUTO: 0.02 X10*3/UL (ref 0–0.7)
IMM GRANULOCYTES NFR BLD AUTO: 0.3 % (ref 0–0.9)
LDLC SERPL CALC-MCNC: 90 MG/DL
LYMPHOCYTES # BLD AUTO: 2.33 X10*3/UL (ref 1.2–4.8)
LYMPHOCYTES NFR BLD AUTO: 31.7 %
MCH RBC QN AUTO: 32.7 PG (ref 26–34)
MCHC RBC AUTO-ENTMCNC: 32.4 G/DL (ref 32–36)
MCV RBC AUTO: 101 FL (ref 80–100)
MICROALBUMIN UR-MCNC: 16.7 MG/L
MICROALBUMIN/CREAT UR: 10.8 UG/MG CREAT
MONOCYTES # BLD AUTO: 0.69 X10*3/UL (ref 0.1–1)
MONOCYTES NFR BLD AUTO: 9.4 %
NEUTROPHILS # BLD AUTO: 3.89 X10*3/UL (ref 1.2–7.7)
NEUTROPHILS NFR BLD AUTO: 52.8 %
NON HDL CHOLESTEROL: 97 MG/DL (ref 0–149)
NRBC BLD-RTO: 0 /100 WBCS (ref 0–0)
PLATELET # BLD AUTO: 300 X10*3/UL (ref 150–450)
POTASSIUM SERPL-SCNC: 3.9 MMOL/L (ref 3.5–5.3)
PROT SERPL-MCNC: 5.8 G/DL (ref 6.4–8.2)
RBC # BLD AUTO: 3.42 X10*6/UL (ref 4–5.2)
SODIUM SERPL-SCNC: 138 MMOL/L (ref 136–145)
TRIGL SERPL-MCNC: 39 MG/DL (ref 0–149)
TSH SERPL-ACNC: 1.4 MIU/L (ref 0.44–3.98)
VLDL: 8 MG/DL (ref 0–40)
WBC # BLD AUTO: 7.4 X10*3/UL (ref 4.4–11.3)

## 2024-09-20 PROCEDURE — 82043 UR ALBUMIN QUANTITATIVE: CPT

## 2024-09-20 PROCEDURE — 76536 US EXAM OF HEAD AND NECK: CPT

## 2024-09-20 PROCEDURE — 85025 COMPLETE CBC W/AUTO DIFF WBC: CPT

## 2024-09-20 PROCEDURE — 80053 COMPREHEN METABOLIC PANEL: CPT

## 2024-09-20 PROCEDURE — 82306 VITAMIN D 25 HYDROXY: CPT

## 2024-09-20 PROCEDURE — 84443 ASSAY THYROID STIM HORMONE: CPT

## 2024-09-20 PROCEDURE — 36415 COLL VENOUS BLD VENIPUNCTURE: CPT

## 2024-09-20 PROCEDURE — 82570 ASSAY OF URINE CREATININE: CPT

## 2024-09-20 PROCEDURE — 80061 LIPID PANEL: CPT

## 2024-09-23 ENCOUNTER — APPOINTMENT (OUTPATIENT)
Dept: PHYSICAL THERAPY | Facility: HOSPITAL | Age: 44
End: 2024-09-23
Payer: COMMERCIAL

## 2024-09-26 ENCOUNTER — TREATMENT (OUTPATIENT)
Dept: PHYSICAL THERAPY | Facility: HOSPITAL | Age: 44
End: 2024-09-26
Payer: COMMERCIAL

## 2024-09-26 ENCOUNTER — APPOINTMENT (OUTPATIENT)
Dept: PAIN MEDICINE | Facility: HOSPITAL | Age: 44
End: 2024-09-26
Payer: COMMERCIAL

## 2024-09-26 DIAGNOSIS — M79.18 MYOFASCIAL PAIN: ICD-10-CM

## 2024-09-26 DIAGNOSIS — G89.29 CHRONIC NECK PAIN: ICD-10-CM

## 2024-09-26 DIAGNOSIS — M54.81 OCCIPITAL NEURALGIA OF LEFT SIDE: Primary | ICD-10-CM

## 2024-09-26 DIAGNOSIS — M54.2 CHRONIC NECK PAIN: ICD-10-CM

## 2024-09-26 PROCEDURE — 20560 NDL INSJ W/O NJX 1 OR 2 MUSC: CPT | Mod: GP | Performed by: PHYSICAL THERAPIST

## 2024-09-26 PROCEDURE — 97140 MANUAL THERAPY 1/> REGIONS: CPT | Mod: GP | Performed by: PHYSICAL THERAPIST

## 2024-09-26 ASSESSMENT — PAIN - FUNCTIONAL ASSESSMENT: PAIN_FUNCTIONAL_ASSESSMENT: 0-10

## 2024-09-26 ASSESSMENT — PAIN SCALES - GENERAL: PAINLEVEL_OUTOF10: 7

## 2024-09-26 NOTE — PROGRESS NOTES
"Physical Therapy    Physical Therapy Treatment    Patient Name: Cora Solares  MRN: [id number]  : 1980   Today's Date: 2024  Time Calculation  Start Time: 1445  Stop Time: 1515  Time Calculation (min): 30 min       PT Therapeutic Procedures Time Entry  Manual Therapy Time Entry: 10  Needle Insertion w/o Injection 1 or 2: 15      Visit #4    Assessment:   Pt states some relief after treatment. She shows 15 minutes late for treatment so manual Tx only today. Advanced needling dosage but she was very sensitive with treatment.     Pt reports 6/10 pain after treatment.    Plan:   Add exercises as tolerated.    Current Problem  1. Occipital neuralgia of left side  Follow Up In Physical Therapy      2. Chronic neck pain  Follow Up In Physical Therapy      3. Myofascial pain  Follow Up In Physical Therapy          Subjective   General  Pt's name and date of birth were confirmed on this date.  Pt states her pain continues to flare with work duties. No noticeable changes yet with PT or needling.    Precautions  Precautions  Precautions Comment: Avoid needling to enlarged lymph nodes R c-spine    Pain  Pain Assessment: 0-10  0-10 (Numeric) Pain Score: 7  Pain Type: Chronic pain  Pain Location: Neck  Pain Orientation: Left    Objective       Treatments:                                         Date: 9/10 2024 09/18/24 2024                                           VISIT# #1 #2 #3 #4     Eval                 Pulleys                             Stretches:         Upper Trap         Levator         Scalene                   Isometrics                                       Chin Tucks     Supine   10 x    3\"H    Scap Retraction                   T-band:          Mid Row     declined     Pul Down                   Manual:         Contract-relax UT stretch   Left, 20\" x 3 Left ,   20\" x 3    Suboccipital release   10' 10' 10', UT/LS stretching             Modalities:                   Dry Needling         # " "Count in   17 17 24   Suboccipital   1/2\" x 4 1/2 \" x 4 1/2\"x8   Cervical paraspinals   1\" x 10 1\" x 10  1\"x8   UT left   1\" x 2 1\" x 2 PA 2\"x3   Supraspinatus left   2\" x 1 2\" x 1 2\"x1   Levator         Greater occipital nerve   1/2 x 1 ea 1/2\" x 1 ea   Prox SCM - L    1/2\"x2   # Count Out   17 17 23             HEP           Goals:  Active       Neck Pain        1. Pt will demonstrate independence with HEP to reinforce gains made in treatment        Start:  09/10/24    Expected End:  10/06/24            2. Pt will report having a decrease in pain to 7/10 at its worst for decreased sleep disruption and increased concentration        Start:  09/10/24    Expected End:  10/06/24            3.  Pt will have an increase in cervical rotation for increased safety with driving       Start:  09/10/24    Expected End:  10/27/24            4. Pt will have an increase in C-T strength by 1/3 MMT grade to improve posture and shoulder flexion        Start:  09/10/24    Expected End:  10/27/24            5. Pt will have an improvement of NDI score to 22 to indicate and increase in functional activity tolerance       Start:  09/10/24    Expected End:  10/27/24               "

## 2024-09-30 ENCOUNTER — APPOINTMENT (OUTPATIENT)
Dept: PHYSICAL THERAPY | Facility: HOSPITAL | Age: 44
End: 2024-09-30
Payer: COMMERCIAL

## 2024-09-30 ENCOUNTER — HOSPITAL ENCOUNTER (OUTPATIENT)
Dept: RADIOLOGY | Facility: HOSPITAL | Age: 44
Discharge: HOME | End: 2024-09-30
Payer: COMMERCIAL

## 2024-09-30 DIAGNOSIS — Z12.31 VISIT FOR SCREENING MAMMOGRAM: ICD-10-CM

## 2024-09-30 PROCEDURE — 77063 BREAST TOMOSYNTHESIS BI: CPT | Performed by: RADIOLOGY

## 2024-09-30 PROCEDURE — 77067 SCR MAMMO BI INCL CAD: CPT

## 2024-09-30 PROCEDURE — 77067 SCR MAMMO BI INCL CAD: CPT | Performed by: RADIOLOGY

## 2024-10-02 ENCOUNTER — TELEPHONE (OUTPATIENT)
Dept: PRIMARY CARE | Facility: CLINIC | Age: 44
End: 2024-10-02
Payer: COMMERCIAL

## 2024-10-02 ENCOUNTER — APPOINTMENT (OUTPATIENT)
Dept: PHYSICAL THERAPY | Facility: HOSPITAL | Age: 44
End: 2024-10-02
Payer: COMMERCIAL

## 2024-10-02 DIAGNOSIS — R92.1 BREAST CALCIFICATIONS ON MAMMOGRAM: Primary | ICD-10-CM

## 2024-10-02 DIAGNOSIS — R92.8 ABNORMAL MAMMOGRAM: ICD-10-CM

## 2024-10-07 ENCOUNTER — TREATMENT (OUTPATIENT)
Dept: PHYSICAL THERAPY | Facility: HOSPITAL | Age: 44
End: 2024-10-07
Payer: COMMERCIAL

## 2024-10-07 DIAGNOSIS — M54.81 OCCIPITAL NEURALGIA OF LEFT SIDE: ICD-10-CM

## 2024-10-07 DIAGNOSIS — M54.2 CHRONIC NECK PAIN: ICD-10-CM

## 2024-10-07 DIAGNOSIS — G89.29 CHRONIC NECK PAIN: ICD-10-CM

## 2024-10-07 DIAGNOSIS — M79.18 MYOFASCIAL PAIN: ICD-10-CM

## 2024-10-07 DIAGNOSIS — M54.81 OCCIPITAL NEURALGIA OF LEFT SIDE: Primary | ICD-10-CM

## 2024-10-07 PROCEDURE — 97110 THERAPEUTIC EXERCISES: CPT | Mod: GP | Performed by: PHYSICAL THERAPIST

## 2024-10-07 PROCEDURE — 20561 NDL INSJ W/O NJX 3+ MUSC: CPT | Mod: GP | Performed by: PHYSICAL THERAPIST

## 2024-10-07 PROCEDURE — 97140 MANUAL THERAPY 1/> REGIONS: CPT | Mod: GP | Performed by: PHYSICAL THERAPIST

## 2024-10-07 ASSESSMENT — PAIN SCALES - GENERAL: PAINLEVEL_OUTOF10: 7

## 2024-10-07 ASSESSMENT — PAIN - FUNCTIONAL ASSESSMENT: PAIN_FUNCTIONAL_ASSESSMENT: 0-10

## 2024-10-07 NOTE — PROGRESS NOTES
"Physical Therapy    Physical Therapy Treatment    Patient Name: Cora Solares  MRN: [id number]  : 1980   Today's Date: 10/7/2024  Time Calculation  Start Time: 1445  Stop Time: 1523  Time Calculation (min): 38 min  PT Therapeutic Procedures Time Entry  Manual Therapy Time Entry: 20  Therapeutic Exercise Time Entry: 8   Dry Needling 3+ muscles: 10        Visit Number: 5  Auth:     Current Problem  Problem List Items Addressed This Visit             ICD-10-CM    Chronic neck pain M54.2, G89.29    Occipital neuralgia of left side - Primary M54.81    Myofascial pain M79.18        Subjective   Patient notes that she has been ill for a little while and had to cancel. She also notes that she was at the end of a work week when she last had treatment and she was very sore for 3 days after the last session. She feels that she is just maintaining where she is at (not getting better and not getting worse overall).  She notes she has gotten a new pillow (cervical traction pillow) with a divot in the middle. She also notes she was told the lymph nodes were normal according to the Ultrasound, but she has to get a mammogram yet.     Precautions  Precautions  STEADI Fall Risk Score (The score of 4 or more indicates an increased risk of falling): No falls    Pain  Pain Assessment: 0-10  0-10 (Numeric) Pain Score: 7  Pain Location: Neck  Pain Orientation: Left  Pain Radiating Towards: left shoulder    Objective   Forward head, mild rounded forward shoulders, visible tension in bilat UT     Treatments:                                         Date: 9/10 2024 09/18/24 2024  10/7/2024                                          VISIT# #1 #2 #3 #4 #5     Eval                   Pulleys                                Stretches:          Upper Trap          Levator          Scalene                     Isometrics                                           Chin Tucks     Supine   10 x    3\"H  Reviewed   Scap Retraction            " "         T-band:           Mid Row     declined      Pul Down                     Manual:          Contract-relax UT stretch   Left, 20\" x 3 Left ,   20\" x 3  X3 ea   Suboccipital release   10' 10' 10', UT/LS stretching x10'   Mobilization        Cervical PA Mobs       x5'   Upper Thoracic PA     x5'   Chavo C7-T1 Manip     x1           Modalities:                     Dry Needling          # Count in   17 17 24 16   Suboccipital   1/2\" x 4 1/2 \" x 4 1/2\"x8 1/2\" x 6   Cervical paraspinals   1\" x 10 1\" x 10  1\"x8 1\" x 8   UT left   1\" x 2 1\" x 2 PA 2\"x3 1\" x 3 marcelo   Supraspinatus left   2\" x 1 2\" x 1 2\"x1 NT   Levator          Greater occipital nerve   1/2 x 1 ea 1/2\" x 1 ea 1/2\" x 1 ea   Prox SCM - L    1/2\"x2    # Count Out   17 17 23 16              HEP       Reviewed       Assessment:   Patient reports 6/10 pain after treatment today. She noted most of her neck felt more relaxed, but the spot at the base of the head on the left still felt tight.     Plan:  OP PT Plan  Treatment/Interventions: Cryotherapy, Dry needling, Education/ Instruction, Electrical stimulation, Manual therapy, Mechanical traction, Neuromuscular re-education, Ultrasound, Therapeutic exercises, Therapeutic activities  PT Plan: Skilled PT  PT Frequency: 2 times per week  Duration: 6-8 weeks    Goals:  Active       Neck Pain        1. Pt will demonstrate independence with HEP to reinforce gains made in treatment  (Progressing)       Start:  09/10/24    Expected End:  10/14/24            2. Pt will report having a decrease in pain to 7/10 at its worst for decreased sleep disruption and increased concentration  (Progressing)       Start:  09/10/24    Expected End:  10/14/24            3.  Pt will have an increase in cervical rotation for increased safety with driving       Start:  09/10/24    Expected End:  10/27/24            4. Pt will have an increase in C-T strength by 1/3 MMT grade to improve posture and shoulder flexion        Start:  " 09/10/24    Expected End:  10/27/24            5. Pt will have an improvement of NDI score to 22 to indicate and increase in functional activity tolerance       Start:  09/10/24    Expected End:  10/27/24

## 2024-10-08 DIAGNOSIS — M54.81 OCCIPITAL NEURALGIA OF LEFT SIDE: ICD-10-CM

## 2024-10-08 RX ORDER — IBUPROFEN 800 MG/1
TABLET ORAL
Qty: 90 TABLET | Refills: 0 | OUTPATIENT
Start: 2024-10-08

## 2024-10-08 RX ORDER — NORTRIPTYLINE HYDROCHLORIDE 10 MG/1
20-30 CAPSULE ORAL NIGHTLY
Qty: 90 CAPSULE | Refills: 5 | Status: SHIPPED | OUTPATIENT
Start: 2024-10-08

## 2024-10-08 RX ORDER — NORTRIPTYLINE HYDROCHLORIDE 10 MG/1
CAPSULE ORAL
Qty: 180 CAPSULE | OUTPATIENT
Start: 2024-10-08

## 2024-10-09 ENCOUNTER — DOCUMENTATION (OUTPATIENT)
Dept: PHYSICAL THERAPY | Facility: HOSPITAL | Age: 44
End: 2024-10-09
Payer: COMMERCIAL

## 2024-10-09 ENCOUNTER — APPOINTMENT (OUTPATIENT)
Dept: PHYSICAL THERAPY | Facility: HOSPITAL | Age: 44
End: 2024-10-09
Payer: COMMERCIAL

## 2024-10-09 ENCOUNTER — TREATMENT (OUTPATIENT)
Dept: PHYSICAL THERAPY | Facility: HOSPITAL | Age: 44
End: 2024-10-09
Payer: COMMERCIAL

## 2024-10-09 DIAGNOSIS — M54.81 OCCIPITAL NEURALGIA OF LEFT SIDE: ICD-10-CM

## 2024-10-09 DIAGNOSIS — M54.2 CHRONIC NECK PAIN: ICD-10-CM

## 2024-10-09 DIAGNOSIS — M79.18 MYOFASCIAL PAIN: ICD-10-CM

## 2024-10-09 DIAGNOSIS — G89.29 CHRONIC NECK PAIN: ICD-10-CM

## 2024-10-09 PROCEDURE — 97110 THERAPEUTIC EXERCISES: CPT | Mod: GP

## 2024-10-09 NOTE — PROGRESS NOTES
"Physical Therapy- DIscharge    Physical Therapy Treatment    Patient Name: Cora Solares  MRN: [id number]  : 1980   Today's Date: 10/9/2024  Time Calculation  Start Time: 547  Stop Time: 630  Time Calculation (min): 43 min  PT Therapeutic Procedures Time Entry  Therapeutic Exercise Time Entry: 43     Visit # 6  Insurance:          Current Problem  1. Chronic neck pain  Follow Up In Physical Therapy      2. Occipital neuralgia of left side  Follow Up In Physical Therapy      3. Myofascial pain  Follow Up In Physical Therapy            Subjective   Pt reports no new complaints, states that she doesn't notice any significant changes in her symptoms with therapy, some of the treatment and exercises feel good at the time but her symptoms return with work, her symptoms also decrease when she is not working.    Precautions  Precautions  STEADI Fall Risk Score (The score of 4 or more indicates an increased risk of falling): 2       Pain  Pain Assessment: 0-10  0-10 (Numeric) Pain Score: 7  Pain Location: Neck  Pain Orientation: Left    Objective      Cervical 9/15 10/9    Flexion 21 48    Extension 16 18    SB R= 14  L= 20 R= 20  L= 25    Rotation R= 35  L= 38 R= 69 L= 50            Outcome Measures:  Other Measures  Neck Disability Index: 32    Treatments:                                         Date: 9/10 2024 09/18/24 2024  10/7/2024 10/9                                          VISIT# #1 #2 #3 #4 #5 6     Eval                     Pulleys                                   Stretches:           Upper Trap           Levator           Scalene                       Isometrics                                               Chin Tucks     Supine   10 x    3\"H  Reviewed    Scap Retraction                       T-band:            Mid Row     declined       Pul Down                       Manual:           Contract-relax UT stretch   Left, 20\" x 3 Left ,   20\" x 3  X3 ea    Suboccipital release   10' 10' 10', " "UT/LS stretching x10'    Mobilization         Cervical PA Mobs       x5'    Upper Thoracic PA     x5'    Chavo C7-T1 Manip     x1             Modalities:                       Dry Needling           # Count in   17 17 24 16    Suboccipital   1/2\" x 4 1/2 \" x 4 1/2\"x8 1/2\" x 6    Cervical paraspinals   1\" x 10 1\" x 10  1\"x8 1\" x 8    UT left   1\" x 2 1\" x 2 PA 2\"x3 1\" x 3 marcelo    Supraspinatus left   2\" x 1 2\" x 1 2\"x1 NT    Levator           Greater occipital nerve   1/2 x 1 ea 1/2\" x 1 ea 1/2\" x 1 ea    Prox SCM - L    1/2\"x2     # Count Out   17 17 23 16                HEP       Reviewed reviewed     Assessment:  Pt has responded to treatment with increase in cervical ROM otherwise no significant changes in her symptoms.  Gets the same relief when not working.  Pt is independent with HEP and agreeable to discharge at this time       Plan:   Discharge to [facility] with return to [facility]    Education:  \"Seat Belt\" stretch-utilizing belt, towel , sheet to assist with UT/ 1st rib depression with UT and scalene stretches       Goals:  Active       Neck Pain        1. Pt will demonstrate independence with HEP to reinforce gains made in treatment  (Met)       Start:  09/10/24    Expected End:  10/14/24    Resolved:  10/16/24         2. Pt will report having a decrease in pain to 7/10 at its worst for decreased sleep disruption and increased concentration  (Progressing)       Start:  09/10/24    Expected End:  10/14/24            3.  Pt will have an increase in cervical rotation for increased safety with driving (Met)       Start:  09/10/24    Expected End:  10/27/24    Resolved:  10/16/24         4. Pt will have an increase in C-T strength by 1/3 MMT grade to improve posture and shoulder flexion  (Progressing)       Start:  09/10/24    Expected End:  10/27/24            5. Pt will have an improvement of NDI score to 22 to indicate and increase in functional activity tolerance (Met)       Start:  09/10/24    Expected End:  10/27/24  "   Resolved:  10/16/24              .

## 2024-10-09 NOTE — PROGRESS NOTES
Physical Therapy                 Therapy Communication Note    Patient Name: Cora Solares  MRN: [id number]  Today's Date: 10/9/2024     Discipline: Physical Therapy    Missed Visit Reason:      Missed Time: No Show    Comment:

## 2024-10-10 ENCOUNTER — OFFICE VISIT (OUTPATIENT)
Dept: PAIN MEDICINE | Facility: HOSPITAL | Age: 44
End: 2024-10-10
Payer: COMMERCIAL

## 2024-10-10 VITALS
SYSTOLIC BLOOD PRESSURE: 122 MMHG | HEART RATE: 95 BPM | RESPIRATION RATE: 16 BRPM | HEIGHT: 58 IN | WEIGHT: 97 LBS | BODY MASS INDEX: 20.36 KG/M2 | OXYGEN SATURATION: 97 % | DIASTOLIC BLOOD PRESSURE: 91 MMHG

## 2024-10-10 DIAGNOSIS — M79.18 MYOFASCIAL PAIN: ICD-10-CM

## 2024-10-10 DIAGNOSIS — G89.29 CHRONIC NECK PAIN: ICD-10-CM

## 2024-10-10 DIAGNOSIS — M54.12 CERVICAL RADICULOPATHY: ICD-10-CM

## 2024-10-10 DIAGNOSIS — M54.12 CERVICAL RADICULAR PAIN: Primary | ICD-10-CM

## 2024-10-10 DIAGNOSIS — M54.2 CHRONIC NECK PAIN: ICD-10-CM

## 2024-10-10 DIAGNOSIS — M54.81 OCCIPITAL NEURALGIA OF LEFT SIDE: ICD-10-CM

## 2024-10-10 PROCEDURE — 99214 OFFICE O/P EST MOD 30 MIN: CPT | Performed by: CLINICAL NURSE SPECIALIST

## 2024-10-10 PROCEDURE — 4004F PT TOBACCO SCREEN RCVD TLK: CPT | Performed by: CLINICAL NURSE SPECIALIST

## 2024-10-10 PROCEDURE — 3008F BODY MASS INDEX DOCD: CPT | Performed by: CLINICAL NURSE SPECIALIST

## 2024-10-10 RX ORDER — IBUPROFEN 800 MG/1
800 TABLET ORAL EVERY 8 HOURS PRN
COMMUNITY

## 2024-10-10 ASSESSMENT — PAIN SCALES - GENERAL: PAINLEVEL_OUTOF10: 7

## 2024-10-10 ASSESSMENT — PAIN - FUNCTIONAL ASSESSMENT: PAIN_FUNCTIONAL_ASSESSMENT: 0-10

## 2024-10-10 ASSESSMENT — ENCOUNTER SYMPTOMS
OCCASIONAL FEELINGS OF UNSTEADINESS: 0
DEPRESSION: 0
LOSS OF SENSATION IN FEET: 0

## 2024-10-10 NOTE — PROGRESS NOTES
Subjective   Patient ID: Cora Solares is a 44 y.o. female who presents for occipital neuralgia/cervical pain      HPI    44-year-old female presents for follow-up of left-sided occipital neuralgia/cervical pain.  Patient also experiences chronic headaches and myofascial pain/muscle tightness.  Previously did well with greater occipital nerve blocks.  Most recent greater occipital nerve block on 8/2/2024 provided moderate relief.    She was experiencing an increase in cervical pain with radiation posteriorly to the base of her skull and also into bilateral shoulders. Previously seeing a  chiropractor which she felt provided temporary relief.  Utilizing a TENS unit.  Due to increasing cervical radicular symptoms, she was sent for a formal course of physical therapy to include dry needling at her last office visit.  Also sent for cervical x-ray.  Manages her pain with tizanidine for muscle tightness and spasm, nortriptyline and occasional ibuprofen.  Presenting at today's office visit for follow-up.  Recent cervical x-ray consistent with mild to moderate degenerative changes.  Continues to experience pain over left greater occiput.  Persistent cervical pain which radiates posteriorly to the base of her skull and into her head accompanied by headaches.  Continues to experience cervical pain which radiates to her bilateral shoulders.  Cervical pain accompanied by intermittent numbness and tingling most noted in her left upper extremity as well as weakness to her left upper extremity.  She denies any issues with balance or any recent falls.  Pain is aching, throbbing and can be sharp.  Pain interferes with her ability to fully function at her job which requires her to do a lot of lifting over her head.  Pain also has been interfering with her sleep.  She completed 6-8 physical therapy sessions with limited relief.  She felt that the most recent physical therapy sessions actually increased her cervical/radicular pain.   "Continues to manage her pain with nortriptyline, ibuprofen and tizanidine as needed.  She tries to limit her use of ibuprofen secondary to potential rebound headaches.  Use to utilize ice and heat.  Continues to utilize her TENS unit.  Continuing home therapy exercises and stretches as able.    Patient states she has neck pain near the  bilateral occipital area.     Xray completed     \"7/10\"    Injection  8/2/24 ONB Left- with moderate relief    Medications: Nortriptyline 3 capsules @ bedtime-no refill needed/Ibuprofen-PRN    Physical therapy 6 sessions- not beneficial  OARRS:  Merle Kc, APRN-CNP, APRN-CNS on 10/10/2024  2:32 PM  I have personally reviewed the OARRS report for Cora Solares. I have considered the risks of abuse, dependence, addiction and diversion    Is the patient prescribed a combination of a benzodiazepine and opioid?  No    Last Urine Drug Screen / ordered today: No  No results found for this or any previous visit (from the past 8760 hour(s)).  N/A    Controlled Substance Agreement:  Date of the Last Agreement:       Monitoring and compliance:    ORT:    PDUQ:    Office Agreement:      Review of Systems    ROS:   General: No fevers, chills, weight loss  Skin: Negative for lesions  Eyes: No acute vision changes  Ears: No vertigo  Nose, mouth, throat: No difficulty swallowing or speaking  Respiratory: No cough, shortness of breath, cyanosis  Cardiovascular: Negative for chest pain syncope or palpitation  Gastrointestinal: No constipation, nausea, vomiting  Neurological: Positive for headache, positive for paresthesia and weakness upper extremities  Psychological: Negative for severe or debilitating anxiety, depression. Negative memory loss  Musculoskeletal: Positive for arthralgia, myalgia, pain and spasm  Endocrine: Negative for weight gain, appetite changes, excessive sweating  Allergy/immune: Negative    All 13 systems were reviewed and are within normal levels except as noted or in " the history of present illness.  Positive or pertinent negative responses are noted or were in the history of present illness. As noted, the patient denies significant or impairing weakness in the bilateral upper and lower extremities, medication induced constipation, and bowel or bladder incontinence.     Current Outpatient Medications:     albuterol 90 mcg/actuation aerosol powdr breath activated inhaler, Inhale 2 puffs every 6 hours., Disp: , Rfl:     budesonide-formoteroL (Symbicort) 160-4.5 mcg/actuation inhaler, Inhale 2 puffs 2 times a day., Disp: , Rfl:     ibuprofen 800 mg tablet, Take 1 tablet (800 mg) by mouth every 8 hours if needed for mild pain (1 - 3)., Disp: , Rfl:     nortriptyline (Pamelor) 10 mg capsule, Take 2-3 capsules (20-30 mg) by mouth once daily at bedtime., Disp: 90 capsule, Rfl: 5    tiZANidine (Zanaflex) 2 mg tablet, Take 1 tablet (2 mg) by mouth 2 times a day as needed for muscle spasms., Disp: 60 tablet, Rfl: 1     Past Medical History:   Diagnosis Date    Acute laryngitis 03/08/2016    Acute laryngitis    Asthma ([facility])     Cutaneous abscess of chest wall 05/28/2014    Chest wall abscess    Cutaneous abscess of left lower limb 10/18/2016    Abscess of left thigh    Cutaneous abscess of right lower limb 08/14/2014    Abscess of right thigh    Headache 12/2021    Noninfective gastroenteritis and colitis, unspecified 10/21/2014    Acute gastroenteritis    Other fecal abnormalities 02/02/2016    Loose stools    Personal history of other diseases of the respiratory system 09/26/2016    History of acute sinusitis    Personal history of other diseases of the respiratory system 01/13/2015    History of influenza    Personal history of other infectious and parasitic diseases 11/30/2015    History of herpes labialis    Personal history of other specified conditions 04/11/2016    History of nausea    Personal history of other specified conditions 11/30/2015    History of dysuria    Personal  "history of urinary (tract) infections 12/31/2014    History of urinary tract infection    Unspecified abdominal pain 02/26/2016    Abdominal cramping    Unspecified otitis externa, right ear 06/18/2014    Otitis externa of right ear        Past Surgical History:   Procedure Laterality Date    HYSTERECTOMY  05/19/2014    Hysterectomy        Family History   Problem Relation Name Age of Onset    Hypertension Mother Mom     Diabetes Mother Mom     Hypertension Father Dad     Breast cancer Father's Sister      Breast cancer Father's Sister      Breast cancer Father's Sister      Breast cancer Father's Sister      Breast cancer Father's Sister          Allergies   Allergen Reactions    Magnesium Citrate Other     mouth sores    Penicillins Unknown    Sulfa (Sulfonamide Antibiotics) Unknown    Sulfamethoxazole-Trimethoprim Other        Objective     Visit Vitals  BP (!) 122/91   Pulse 95   Resp 16   Ht 1.461 m (4' 9.5\")   Wt (!) 44 kg (97 lb)   SpO2 97%   BMI 20.63 kg/m²   OB Status Hysterectomy   Smoking Status Every Day   BSA 1.34 m²        Physical Exam    PE:  General: Well-developed, well-nourished, no acute distress. The patient demonstrates no pain behavior, symptom magnification or overt drug-seeking behavior.  Eye: Pupils appropriate for room lighting  Neck/thyroid: No obvious goiter or enlargement of neck noted  Respiratory exam: Normal respiratory effort, unlabored respiration. No accessory muscle use noted  Cardiac exam: Bilateral radial pulses intact  Abdominal: Nondistended  Spine, cervical: Tenderness to paraspinous musculature paracervical region left greater than right.  Multiple myofascial tender points and significant muscle tightness upper trapezius muscles left greater than right.  Tenderness over left greater occipital protuberance.  Flexion and extension both increase her pain.  Rotational twisting increasing her pain.  Spine, lumbar: The patient is able to rise from a seated to standing position " without hesitancy, push off, or delay. Gait is grossly nonantalgic.  No tenderness to paraspinous musculature is noted.  Neurologic exam: Muscle strength is antigravity in all 4 extremities.  Equal muscle strength bilateral upper extremities 5/5 with normal sensation bilateral upper extremities.  Psychiatric exam: Judgment and insight normal, affect normal, speech is fluent, affect appropriate, demonstrating no signs of hypersomnolence, sedation, or confusion        Assessment/Plan   Problem List Items Addressed This Visit             ICD-10-CM    Chronic neck pain M54.2, G89.29    Occipital neuralgia of left side M54.81    Myofascial pain M79.18    Cervical radiculopathy M54.12     44 year-old female with history of occipital neuralgia responding well to previous greater occipital nerve blocks and increasing cervical radicular symptoms presents for follow-up. Received moderate relief with her most recent greater occipital nerve block injection on 8/2/2024.  At that time she was noting an increase in cervical radicular symptoms with most radiation to her left upper extremity.  Patient sent for cervical x-ray and physical therapy.  Presenting at today's office visit for follow-up.  Continues to experience cervical pain with radiation to the base of her skull as well as into bilateral shoulders.  She does experience some numbness and tingling to her shoulders as well left upper extremity.  She also has noted some weakness in her left upper extremity.  Continues to experience pain over bilateral greater occipital protuberances left greater than right.  Cervical x-ray consistent with mild to moderate degenerative changes.  She proceeded with a formal course of physical therapy completing 6-8 physical therapy sessions with limited relief.  She will actually felt that the most recent physical therapy sessions increased her symptoms.  She continues to try to do home therapy exercises and stretches, however, she has noted  worsening muscle tightness with spasms in her left upper trapezius muscles.  Symptoms and exam consistent with cervical radicular symptoms, myofascial pain/muscle tightness as well as occipital neuralgia.  Patient states that pain interferes with her ability to work and has interrupted her sleep.  At this point, the patient has failed conservative treatment in the form of medication, physical therapy and injections.  I think it is appropriate to send the patient for a cervical MRI for updated imaging.  Further treatment recommendations based on cervical MRI. As far as medication, the patient does feel that the increased dose of nortriptyline 30 mg at bedtime has been helpful.  She continues to utilize ibuprofen 800 mg as needed.  Recommended that she limit use of oral NSAIDs secondary to the potential for rebound headaches.  Advised the patient to supplement with Tylenol limiting her total Tylenol dose to no more than 3000 mg in 24 hours.  She also feels that tizanidine has been helpful for muscle tightness and spasms.  Plan reviewed with patient at today's visit.    -Considering patient has worsening symptoms of cervical radiculopathy and failed conservative treatment including physical therapy, injections and medications; we will send for MRI cervical spine.  Further treatment recommendations based on results of imaging.  -Continue nortriptyline to 30 mg at bedtime for neuropathic component of pain.  Reviewed potential side effects with patient.  Patient denies any significant cardiac history or history of arrhythmias.  - Patient may continue prescription strength ibuprofen 800 mg to take when she has headache at work, however I did review with the patient to minimize the amount of NSAID use due to the potential to cause rebound headaches.  Reviewed most recent CMP with adequate renal function.  -Recommended she supplement with Tylenol limiting her total Tylenol dose to less than 3000 mg in 24 hours.  -Tizanidine  2 mg up to 2 times per day as needed for muscle tightness and spasm.  Reviewed potential side effects with patient.  Most recent CMP with normal liver function.  -Follow-up approximately 4 weeks after her MRI for reevaluation.  Further treatment recommendations based on results of imaging.       Disclaimer: This note was transcribed using an audio transcription device.  As such, minor errors may be present with regard to spelling, punctuation, and inadvertent word insertion.  Please disregard such errors.          Other Visit Diagnoses         Codes    Cervical radicular pain    -  Primary M54.12    Relevant Orders    MR cervical spine wo IV contrast

## 2024-10-10 NOTE — ASSESSMENT & PLAN NOTE
44 year-old female with history of occipital neuralgia responding well to previous greater occipital nerve blocks and increasing cervical radicular symptoms presents for follow-up. Received moderate relief with her most recent greater occipital nerve block injection on 8/2/2024.  At that time she was noting an increase in cervical radicular symptoms with most radiation to her left upper extremity.  Patient sent for cervical x-ray and physical therapy.  Presenting at today's office visit for follow-up.  Continues to experience cervical pain with radiation to the base of her skull as well as into bilateral shoulders.  She does experience some numbness and tingling to her shoulders as well left upper extremity.  She also has noted some weakness in her left upper extremity.  Continues to experience pain over bilateral greater occipital protuberances left greater than right.  Cervical x-ray consistent with mild to moderate degenerative changes.  She proceeded with a formal course of physical therapy completing 6-8 physical therapy sessions with limited relief.  She will actually felt that the most recent physical therapy sessions increased her symptoms.  She continues to try to do home therapy exercises and stretches, however, she has noted worsening muscle tightness with spasms in her left upper trapezius muscles.  Symptoms and exam consistent with cervical radicular symptoms, myofascial pain/muscle tightness as well as occipital neuralgia.  Patient states that pain interferes with her ability to work and has interrupted her sleep.  At this point, the patient has failed conservative treatment in the form of medication, physical therapy and injections.  I think it is appropriate to send the patient for a cervical MRI for updated imaging.  Further treatment recommendations based on cervical MRI. As far as medication, the patient does feel that the increased dose of nortriptyline 30 mg at bedtime has been helpful.  She  continues to utilize ibuprofen 800 mg as needed.  Recommended that she limit use of oral NSAIDs secondary to the potential for rebound headaches.  Advised the patient to supplement with Tylenol limiting her total Tylenol dose to no more than 3000 mg in 24 hours.  She also feels that tizanidine has been helpful for muscle tightness and spasms.  Plan reviewed with patient at today's visit.    -Considering patient has worsening symptoms of cervical radiculopathy and failed conservative treatment including physical therapy, injections and medications; we will send for MRI cervical spine.  Further treatment recommendations based on results of imaging.  -Continue nortriptyline to 30 mg at bedtime for neuropathic component of pain.  Reviewed potential side effects with patient.  Patient denies any significant cardiac history or history of arrhythmias.  - Patient may continue prescription strength ibuprofen 800 mg to take when she has headache at work, however I did review with the patient to minimize the amount of NSAID use due to the potential to cause rebound headaches.  Reviewed most recent CMP with adequate renal function.  -Recommended she supplement with Tylenol limiting her total Tylenol dose to less than 3000 mg in 24 hours.  -Tizanidine 2 mg up to 2 times per day as needed for muscle tightness and spasm.  Reviewed potential side effects with patient.  Most recent CMP with normal liver function.  -Follow-up approximately 4 weeks after her MRI for reevaluation.  Further treatment recommendations based on results of imaging.       Disclaimer: This note was transcribed using an audio transcription device.  As such, minor errors may be present with regard to spelling, punctuation, and inadvertent word insertion.  Please disregard such errors.

## 2024-10-15 ENCOUNTER — HOSPITAL ENCOUNTER (OUTPATIENT)
Dept: RADIOLOGY | Facility: HOSPITAL | Age: 44
Discharge: HOME | End: 2024-10-15
Payer: COMMERCIAL

## 2024-10-15 VITALS — WEIGHT: 98 LBS | BODY MASS INDEX: 21.14 KG/M2 | HEIGHT: 57 IN

## 2024-10-15 DIAGNOSIS — R92.8 ABNORMAL MAMMOGRAM: ICD-10-CM

## 2024-10-15 PROCEDURE — 77066 DX MAMMO INCL CAD BI: CPT

## 2024-10-26 ENCOUNTER — HOSPITAL ENCOUNTER (OUTPATIENT)
Dept: RADIOLOGY | Facility: HOSPITAL | Age: 44
Discharge: HOME | End: 2024-10-26
Payer: COMMERCIAL

## 2024-10-26 DIAGNOSIS — M54.12 CERVICAL RADICULAR PAIN: ICD-10-CM

## 2024-10-26 PROCEDURE — 72141 MRI NECK SPINE W/O DYE: CPT | Performed by: RADIOLOGY

## 2024-10-26 PROCEDURE — 72141 MRI NECK SPINE W/O DYE: CPT

## 2024-10-30 ENCOUNTER — TELEPHONE (OUTPATIENT)
Dept: PAIN MEDICINE | Facility: HOSPITAL | Age: 44
End: 2024-10-30
Payer: COMMERCIAL

## 2024-10-30 ENCOUNTER — PATIENT MESSAGE (OUTPATIENT)
Dept: PAIN MEDICINE | Facility: HOSPITAL | Age: 44
End: 2024-10-30
Payer: COMMERCIAL

## 2024-11-06 ENCOUNTER — OFFICE VISIT (OUTPATIENT)
Dept: PAIN MEDICINE | Facility: HOSPITAL | Age: 44
End: 2024-11-06
Payer: COMMERCIAL

## 2024-11-06 VITALS
HEART RATE: 98 BPM | BODY MASS INDEX: 21.57 KG/M2 | RESPIRATION RATE: 16 BRPM | HEIGHT: 57 IN | WEIGHT: 100 LBS | SYSTOLIC BLOOD PRESSURE: 116 MMHG | OXYGEN SATURATION: 99 % | DIASTOLIC BLOOD PRESSURE: 80 MMHG

## 2024-11-06 DIAGNOSIS — M54.12 CERVICAL RADICULOPATHY: Primary | ICD-10-CM

## 2024-11-06 DIAGNOSIS — M79.18 MYOFASCIAL PAIN: ICD-10-CM

## 2024-11-06 PROCEDURE — 99214 OFFICE O/P EST MOD 30 MIN: CPT | Performed by: CLINICAL NURSE SPECIALIST

## 2024-11-06 PROCEDURE — 3008F BODY MASS INDEX DOCD: CPT | Performed by: CLINICAL NURSE SPECIALIST

## 2024-11-06 PROCEDURE — 4004F PT TOBACCO SCREEN RCVD TLK: CPT | Performed by: CLINICAL NURSE SPECIALIST

## 2024-11-06 RX ORDER — NORTRIPTYLINE HYDROCHLORIDE 10 MG/1
40 CAPSULE ORAL NIGHTLY
Qty: 120 CAPSULE | Refills: 2 | Status: SHIPPED | OUTPATIENT
Start: 2024-11-06 | End: 2024-12-06

## 2024-11-06 ASSESSMENT — ENCOUNTER SYMPTOMS
DEPRESSION: 0
OCCASIONAL FEELINGS OF UNSTEADINESS: 1
LOSS OF SENSATION IN FEET: 0

## 2024-11-06 NOTE — PROGRESS NOTES
Subjective   Patient ID: Cora Solares is a 44 y.o. female who presents for occipital neuralgia and cervical radicular pain.  HPI    -year-old female presents for follow-up of left-sided occipital neuralgia and cervical radicular symptoms.  Patient has chronic headaches as well as myofascial pain and muscle tightness in her cervical region.  She has done well with greater occipital nerve blocks receiving moderate relief.  States that pain relief lasts approximately 6 to 7 weeks.  Presented at her last office visit with an increase in cervical pain radiating posteriorly to the base of her skull as well as into bilateral shoulders and upper extremities.  Completed a formal course of physical therapy targeting cervical radicular symptoms and myofascial pain including dry needling.  Completed 6 to 8 weeks of physical therapy with limited relief.  She felt that the most recent physical therapy sessions actually increased her pain.  Due to failure of conservative treatment the patient was sent for a cervical MRI.  Manages her pain with nortriptyline, ibuprofen and tizanidine as needed.  Utilizes a TENS unit.  Presenting at today's office visit for follow-up after cervical MRI.  Cervical MRI consistent with degenerative disc disease and facet arthrosis with moderate spinal canal stenosis most noted at C4-5 and C5-6.  Presents with persistent pain over bilateral greater occipital protuberances left greater than right.  Continues to be most bothered by cervical pain radiating to the base of her skull as well as into bilateral shoulders and bilateral upper extremities left greater than right.  Patient has numbness and tingling in her left upper extremity to the level of her fingers most involving her second and third finger.  She also has noted an increase in weakness to bilateral upper extremities.  Patient notes that she drops objects frequently.  She denies any issues with balance or any recent falls.  Describes her  "pain as aching, burning and throbbing.  She is having difficulty lifting her head as well as rotating from side-to-side secondary to pain and stiffness.  Pain has also interrupted her sleep.  She does feel that nortriptyline has been helpful, however, she continues to have difficulty sleeping secondary to pain.  She will occasionally supplement with ibuprofen and Tylenol.  She has needed to increase her tizanidine to 2-3 times per day secondary to muscle tightness and spasms most noted in her upper trapezius muscles.    Patient states she has neck pain near the bilateral occipital area.     MRI completed     \"8/10\"    Injection  8/2/24 ONB Left- with moderate relief    Medications: Nortriptyline 3 capsules @ bedtime-no refill needed/Ibuprofen-PRN/Tizanidine PRN    Physical therapy 6 sessions- not beneficial  OARRS:  No data recorded  I have personally reviewed the OARRS report for Cora ELMIRA Solares. I have considered the risks of abuse, dependence, addiction and diversion    Is the patient prescribed a combination of a benzodiazepine and opioid?  No    Last Urine Drug Screen / ordered today: No  No results found for this or any previous visit (from the past 8760 hours).  N/A    Controlled Substance Agreement:  Date of the Last Agreement:       Monitoring and compliance:    ORT:    PDUQ:    Office Agreement:      Review of Systems    ROS:   General: No fevers, chills, weight loss  Skin: Negative for lesions  Eyes: No acute vision changes  Ears: No vertigo  Nose, mouth, throat: No difficulty swallowing or speaking  Respiratory: No cough, shortness of breath, cyanosis  Cardiovascular: Negative for chest pain syncope or palpitation  Gastrointestinal: No constipation, nausea, vomiting  Neurological: Negative for headache, positive for: Paresthesia and weakness upper extremities  Psychological: Negative for severe or debilitating anxiety, depression. Negative memory loss  Musculoskeletal: Positive for arthralgia, myalgia, " pain and spasm  Endocrine: Negative for weight gain, appetite changes, excessive sweating  Allergy/immune: Negative    All 13 systems were reviewed and are within normal levels except as noted or in the history of present illness.  Positive or pertinent negative responses are noted or were in the history of present illness. As noted, the patient denies significant or impairing weakness in the bilateral upper and lower extremities, medication induced constipation, and bowel or bladder incontinence.     Current Outpatient Medications:     albuterol 90 mcg/actuation aerosol powdr breath activated inhaler, Inhale 2 puffs every 6 hours., Disp: , Rfl:     budesonide-formoteroL (Symbicort) 160-4.5 mcg/actuation inhaler, Inhale 2 puffs 2 times a day., Disp: , Rfl:     ibuprofen 800 mg tablet, Take 1 tablet (800 mg) by mouth every 8 hours if needed for mild pain (1 - 3)., Disp: , Rfl:     nortriptyline (Pamelor) 10 mg capsule, Take 2-3 capsules (20-30 mg) by mouth once daily at bedtime., Disp: 90 capsule, Rfl: 5    tiZANidine (Zanaflex) 2 mg tablet, Take 1 tablet (2 mg) by mouth 2 times a day as needed for muscle spasms., Disp: 60 tablet, Rfl: 1     Past Medical History:   Diagnosis Date    Acute laryngitis 03/08/2016    Acute laryngitis    Asthma     Cutaneous abscess of chest wall 05/28/2014    Chest wall abscess    Cutaneous abscess of left lower limb 10/18/2016    Abscess of left thigh    Cutaneous abscess of right lower limb 08/14/2014    Abscess of right thigh    Headache 12/2021    Noninfective gastroenteritis and colitis, unspecified 10/21/2014    Acute gastroenteritis    Other fecal abnormalities 02/02/2016    Loose stools    Personal history of other diseases of the respiratory system 09/26/2016    History of acute sinusitis    Personal history of other diseases of the respiratory system 01/13/2015    History of influenza    Personal history of other infectious and parasitic diseases 11/30/2015    History of herpes  labialis    Personal history of other specified conditions 04/11/2016    History of nausea    Personal history of other specified conditions 11/30/2015    History of dysuria    Personal history of urinary (tract) infections 12/31/2014    History of urinary tract infection    Unspecified abdominal pain 02/26/2016    Abdominal cramping    Unspecified otitis externa, right ear 06/18/2014    Otitis externa of right ear        Past Surgical History:   Procedure Laterality Date    HYSTERECTOMY  05/19/2014    Hysterectomy        Family History   Problem Relation Name Age of Onset    Hypertension Mother Mom     Diabetes Mother Mom     Hypertension Father Dad     Breast cancer Father's Sister      Breast cancer Father's Sister      Breast cancer Father's Sister      Breast cancer Father's Sister      Breast cancer Father's Sister          Allergies   Allergen Reactions    Magnesium Citrate Other     mouth sores    Penicillins Unknown    Sulfa (Sulfonamide Antibiotics) Unknown    Sulfamethoxazole-Trimethoprim Other        Objective     Visit Vitals  OB Status Hysterectomy   Smoking Status Every Day        Physical Exam    PE:  General: Well-developed, well-nourished, no acute distress. The patient demonstrates no pain behavior, symptom magnification or overt drug-seeking behavior.  Eye: Pupils appropriate for room lighting  Neck/thyroid: No obvious goiter or enlargement of neck noted  Respiratory exam: Normal respiratory effort, unlabored respiration. No accessory muscle use noted  Cardiac exam: Bilateral radial pulses intact  Abdominal: Nondistended  Spine, cervical: Tenderness to paraspinous musculature paracervical region bilaterally.  Myofascial tender points in muscle tightness upper trapezius muscles left greater than right.  Tenderness over bilateral greater occipital protuberances left greater than right.  Flexion intact with extension exacerbating pain.  Rotational twisting increasing pain.  No tenderness over  cervical spine.  Spine, lumbar: The patient is able to rise from a seated to standing position without hesitancy, push off, or delay. Gait is grossly nonantalgic.  No tenderness to paraspinous musculature is noted.  Neurologic exam: Muscle strength is antigravity in all 4 extremities.  Equal muscle strength bilateral upper extremities 5/5.  Normal sensation bilateral upper extremities.  Upper extremity DTR 3+ right and 2+ left.  Psychiatric exam: Judgment and insight normal, affect normal, speech is fluent, affect appropriate, demonstrating no signs of hypersomnolence, sedation, or confusion        Assessment/Plan   Problem List Items Addressed This Visit             ICD-10-CM    Myofascial pain M79.18    Cervical radiculopathy - Primary M54.12     44 year-old female with history of occipital neuralgia responding well to previous greater occipital nerve blocks and increasing cervical radicular symptoms presents for follow-up. Received moderate relief with her most recent greater occipital nerve block injection on 8/2/2024.  At that time she was experiencing an increase in cervical radicular symptoms.  Patient sent for formal course of physical therapy targeting cervical pain as well as myofascial component of pain.  Unfortunately patient did not receive significant relief with physical therapy.  At that point the patient was scheduled for cervical MRI.  Presenting at today's office visit for follow-up and review of her cervical MRI.  States that cervical pain has become unbearable and is interfering with quality of her life.  Patient endorses that work is very difficult secondary to pain resulting from even minimal movement of her head and neck.  Continues to experience cervical pain with radiation to the base of her skull as well as into bilateral shoulders and upper extremities left greater than right.  She is endorsing numbness and tingling most noted in her left upper extremity as well as a burning sensation in  her right upper extremity.  She does feel that her upper extremities are weak and has been having difficulty dropping objects frequently.  Denies any issues with balance or falls.  Reviewed recent cervical MRI with Dr. Dawson suggesting that the patient may benefit from a cervical epidural steroid injection at C7/T1 for symptoms consistent with a cervical radiculitis.  We also discussed that due to the intensity of her symptoms she may benefit from a neurosurgery evaluation prior to proceeding with injection therapy.  Patient states that she already has an appointment scheduled for 11/7/2024 with Dr. Verma, neurosurgery.  Recommended that she proceed with a evaluation by neurosurgery before we consider proceeding with a cervical epidural steroid injection. As far as medication, the patient does feel that nortriptyline is helping, however, she continues to have difficulty sleeping secondary to pain.  At this time I think we can increase her nortriptyline to 40 mg at bedtime to hopefully provide additional neuropathic pain relief.  Advised her to limit use of ibuprofen secondary to NSAIDs contributing to rebound headaches.  Recommended she try to supplement with Tylenol limiting her total Tylenol dose to less than 3000 mg in 24 hours.  Also recommended she could continue to use ice alternating with heat.  She may continue to supplement with tizanidine for muscle tightness and spasms.  May also continue to utilize her TENS unit.  Plan reviewed with patient at today's visit.    -Advised patient to keep her appointment with neurosurgery for evaluation of cervical radicular symptoms.  Patient is scheduled for neurosurgery evaluation on 11/7/2024.  Patient may benefit from cervical epidural steroid injection after evaluation by neurosurgery.  -Increase nortriptyline to 40 mg at bedtime for neuropathic component of pain.  Reviewed potential side effects with patient.  Patient denies any significant cardiac history or  history of arrhythmias.  - Patient may continue prescription strength ibuprofen 800 mg to take when she has headache at work, however I did review with the patient to minimize the amount of NSAID use due to the potential to cause rebound headaches.  Reviewed most recent CMP with adequate renal function.  -Recommended she supplement with Tylenol limiting her total Tylenol dose to less than 3000 mg in 24 hours.  -Tizanidine 2 mg up to 2 times per day as needed for muscle tightness and spasm.  Reviewed potential side effects with patient.  Most recent CMP with normal liver function.  -Follow-up after evaluation by neurosurgery.         Disclaimer: This note was transcribed using an audio transcription device.  As such, minor errors may be present with regard to spelling, punctuation, and inadvertent word insertion.  Please disregard such errors.         Relevant Medications    nortriptyline (Pamelor) 10 mg capsule

## 2024-11-06 NOTE — ASSESSMENT & PLAN NOTE
44 year-old female with history of occipital neuralgia responding well to previous greater occipital nerve blocks and increasing cervical radicular symptoms presents for follow-up. Received moderate relief with her most recent greater occipital nerve block injection on 8/2/2024.  At that time she was experiencing an increase in cervical radicular symptoms.  Patient sent for formal course of physical therapy targeting cervical pain as well as myofascial component of pain.  Unfortunately patient did not receive significant relief with physical therapy.  At that point the patient was scheduled for cervical MRI.  Presenting at today's office visit for follow-up and review of her cervical MRI.  States that cervical pain has become unbearable and is interfering with quality of her life.  Patient endorses that work is very difficult secondary to pain resulting from even minimal movement of her head and neck.  Continues to experience cervical pain with radiation to the base of her skull as well as into bilateral shoulders and upper extremities left greater than right.  She is endorsing numbness and tingling most noted in her left upper extremity as well as a burning sensation in her right upper extremity.  She does feel that her upper extremities are weak and has been having difficulty dropping objects frequently.  Denies any issues with balance or falls.  Reviewed recent cervical MRI with Dr. Dawson suggesting that the patient may benefit from a cervical epidural steroid injection at C7/T1 for symptoms consistent with a cervical radiculitis.  We also discussed that due to the intensity of her symptoms she may benefit from a neurosurgery evaluation prior to proceeding with injection therapy.  Patient states that she already has an appointment scheduled for 11/7/2024 with Dr. Verma, neurosurgery.  Recommended that she proceed with a evaluation by neurosurgery before we consider proceeding with a cervical epidural steroid  injection. As far as medication, the patient does feel that nortriptyline is helping, however, she continues to have difficulty sleeping secondary to pain.  At this time I think we can increase her nortriptyline to 40 mg at bedtime to hopefully provide additional neuropathic pain relief.  Advised her to limit use of ibuprofen secondary to NSAIDs contributing to rebound headaches.  Recommended she try to supplement with Tylenol limiting her total Tylenol dose to less than 3000 mg in 24 hours.  Also recommended she could continue to use ice alternating with heat.  She may continue to supplement with tizanidine for muscle tightness and spasms.  May also continue to utilize her TENS unit.  Plan reviewed with patient at today's visit.    -Advised patient to keep her appointment with neurosurgery for evaluation of cervical radicular symptoms.  Patient is scheduled for neurosurgery evaluation on 11/7/2024.  Patient may benefit from cervical epidural steroid injection after evaluation by neurosurgery.  -Increase nortriptyline to 40 mg at bedtime for neuropathic component of pain.  Reviewed potential side effects with patient.  Patient denies any significant cardiac history or history of arrhythmias.  - Patient may continue prescription strength ibuprofen 800 mg to take when she has headache at work, however I did review with the patient to minimize the amount of NSAID use due to the potential to cause rebound headaches.  Reviewed most recent CMP with adequate renal function.  -Recommended she supplement with Tylenol limiting her total Tylenol dose to less than 3000 mg in 24 hours.  -Tizanidine 2 mg up to 2 times per day as needed for muscle tightness and spasm.  Reviewed potential side effects with patient.  Most recent CMP with normal liver function.  -Follow-up after evaluation by neurosurgery.         Disclaimer: This note was transcribed using an audio transcription device.  As such, minor errors may be present with  regard to spelling, punctuation, and inadvertent word insertion.  Please disregard such errors.

## 2024-11-06 NOTE — LETTER
November 6, 2024     Patient: Cora Solares   YOB: 1980   Date of Visit: 11/6/2024       To Whom It May Concern:    Cora Solares was seen in my clinic on 11/6/2024 at 1:30 pm. Please excuse Cora for her absence from work on this day to make the appointment.    If you have any questions or concerns, please don't hesitate to call.         Sincerely,         Merle Kc, APRN-CNP, APRN-CNS        CC: No Recipients

## 2024-11-06 NOTE — PROGRESS NOTES
Chief Complaint:   Cora Solares is a 44 y.o. year old woman here for cervical spinal stenosis     HPI  Cora Solares     MRI cervical spine 10/26/24    Right handed  2020 ED visit due to neck stiffness.  Referred to pain management for occipital neuralgia. Patient is a - taping machine.    No history of previous surgery.    Cervical pain with radiation to the base of her skull as well as into bilateral shoulders. She does experience some numbness and tingling to her shoulders as well as noting an increase in headaches. Pain accompanied by muscle tightness and spasms upper trapezius muscles bilaterally.  Patient states left side is worse.  Notes feeling of swelling/tightness in shoulder areas both sides.   Left arm numbness/tingling associated with shooting pain.  Tingling in hands, right upper extremity has burning sensation in bicep area which occurs spontaneously.  Notes weakness in both arms and hands with reduced fine motor control over the past 1 year with significantly decreased  strength.  Cannot open jars, has trouble holding utensils.  Pain with ROM of neck - feels it is range of motion is limited.  She has also had balance issues, but patient states she has always had this, just has gotten worse over the past year.    Patient sees Dr. Johnson - pain management - received occipital nerve blocks with some relief.  At times she has been bed bound due to pain.  Patient takes tizanidine, Ibuprofen, Tylenol, Aleve.  Patient states she is a recovering narcotic addiction and needs to stay away from narcotics as much as possible.  Physical therapy 8 week session - [facility].   Chiropractor  3-4 months in [place] - now going to a different chiropractor.       Review of Systems  All other systems reviewed and negative other than what is already stated in this note.      Past Medical History:   Diagnosis Date    Acute laryngitis 03/08/2016    Acute laryngitis    Asthma     Cutaneous abscess  of chest wall 05/28/2014    Chest wall abscess    Cutaneous abscess of left lower limb 10/18/2016    Abscess of left thigh    Cutaneous abscess of right lower limb 08/14/2014    Abscess of right thigh    Headache 12/2021    Noninfective gastroenteritis and colitis, unspecified 10/21/2014    Acute gastroenteritis    Other fecal abnormalities 02/02/2016    Loose stools    Personal history of other diseases of the respiratory system 09/26/2016    History of acute sinusitis    Personal history of other diseases of the respiratory system 01/13/2015    History of influenza    Personal history of other infectious and parasitic diseases 11/30/2015    History of herpes labialis    Personal history of other specified conditions 04/11/2016    History of nausea    Personal history of other specified conditions 11/30/2015    History of dysuria    Personal history of urinary (tract) infections 12/31/2014    History of urinary tract infection    Unspecified abdominal pain 02/26/2016    Abdominal cramping    Unspecified otitis externa, right ear 06/18/2014    Otitis externa of right ear       Patient Active Problem List   Diagnosis    Depression    Cough    Constipation    Chronic neck pain    Reactive airway disease (HHS-HCC)    Asthma    Allergic rhinitis    Elevated MCV    Endometriosis    Drug dependence (Multi)    Abscess    External hemorrhoids    Hand crush injury, left, initial encounter    Herpes labialis    Infected cyst of skin    Opiate dependence, continuous (Multi)    Occipital neuralgia of left side    Lower abdominal pain    Low back pain    Left breast lump    Insomnia    Opioid abuse, episodic (Multi)    Opioid type dependence in remission (Multi)    Right hip pain    Right anterior knee pain    Visit for screening mammogram    Posterior cervical adenopathy    Myofascial pain    Breast calcifications on mammogram    Cervical radiculopathy       Past Surgical History:   Procedure Laterality Date    HYSTERECTOMY   05/19/2014    Hysterectomy       Family History   Problem Relation Name Age of Onset    Hypertension Mother Mom     Diabetes Mother Mom     Hypertension Father Dad     Breast cancer Father's Sister      Breast cancer Father's Sister      Breast cancer Father's Sister      Breast cancer Father's Sister      Breast cancer Father's Sister         Social History     Tobacco Use    Smoking status: Every Day     Current packs/day: 0.50     Average packs/day: 0.5 packs/day for 29.0 years (15.0 ttl pk-yrs)     Types: Cigarettes    Smokeless tobacco: Never   Vaping Use    Vaping status: Some Days    Substances: Nicotine   Substance Use Topics    Alcohol use: Not Currently    Drug use: Not Currently     Types: Marijuana     Comment: med marijuana last used 5/22         Current Outpatient Medications:     albuterol 90 mcg/actuation aerosol powdr breath activated inhaler, Inhale 2 puffs every 6 hours., Disp: , Rfl:     budesonide-formoteroL (Symbicort) 160-4.5 mcg/actuation inhaler, Inhale 2 puffs 2 times a day., Disp: , Rfl:     ibuprofen 800 mg tablet, Take 1 tablet (800 mg) by mouth every 8 hours if needed for mild pain (1 - 3)., Disp: , Rfl:     nortriptyline (Pamelor) 10 mg capsule, Take 4 capsules (40 mg) by mouth once daily at bedtime., Disp: 120 capsule, Rfl: 2    tiZANidine (Zanaflex) 2 mg tablet, Take 1 tablet (2 mg) by mouth 2 times a day as needed for muscle spasms., Disp: 60 tablet, Rfl: 1        Objective   There were no vitals filed for this visit.    no acute distress, well developed woman appearing her  stated age  normal sclera  moist mucus membranes  no peripheral edema   symmetric chest rise  nondistended abdomen  alert and oriented, pupils equal and round, extraocular movements intact, reduced left  strength 4 out of 5, otherwise grossly full strength in all other extremities, normal sensation to light touch throughout, normal symmetric reflexes, no dysmetria  normal mood      Assessment/Plan   I  personally reviewed MRI of the cervical spine done on 10/26/2024 and upright xray of c spine done on 8/26/2024, which shows severe cervical spondylosis from C3-C7.  There is severe spinal canal stenosis and spinal cord compression from C4-C7.  Because of the severity of spinal cord compression and progressive cervical myelopathy with left  weakness on exam, I recommended surgery via a partial C3 and C7 laminectomy and C4-C6 cervical laminoplasty.  I explained the surgery and risks of the surgery including weakness, numbness, csf leak, infection, and spinal instability.        Irwin Verma MD

## 2024-11-07 ENCOUNTER — APPOINTMENT (OUTPATIENT)
Dept: NEUROSURGERY | Facility: CLINIC | Age: 44
End: 2024-11-07
Payer: COMMERCIAL

## 2024-11-07 VITALS
BODY MASS INDEX: 21.57 KG/M2 | SYSTOLIC BLOOD PRESSURE: 116 MMHG | WEIGHT: 100 LBS | DIASTOLIC BLOOD PRESSURE: 80 MMHG | HEIGHT: 57 IN | HEART RATE: 75 BPM

## 2024-11-07 DIAGNOSIS — M47.12 CERVICAL SPONDYLOSIS WITH MYELOPATHY: Primary | ICD-10-CM

## 2024-11-07 PROCEDURE — 3008F BODY MASS INDEX DOCD: CPT | Performed by: NEUROLOGICAL SURGERY

## 2024-11-07 PROCEDURE — 99205 OFFICE O/P NEW HI 60 MIN: CPT | Performed by: NEUROLOGICAL SURGERY

## 2024-11-07 ASSESSMENT — ENCOUNTER SYMPTOMS
LOSS OF SENSATION IN FEET: 0
OCCASIONAL FEELINGS OF UNSTEADINESS: 1
DEPRESSION: 0

## 2024-11-07 ASSESSMENT — PAIN SCALES - GENERAL: PAINLEVEL_OUTOF10: 6

## 2024-11-08 ENCOUNTER — TELEPHONE (OUTPATIENT)
Dept: PAIN MEDICINE | Facility: HOSPITAL | Age: 44
End: 2024-11-08
Payer: COMMERCIAL

## 2024-11-08 NOTE — TELEPHONE ENCOUNTER
Patient calling to advise neurologist is ok with patient to have injection per conversation with Chelsey at last appointment..  place order for injection to schedule

## 2024-11-11 NOTE — TELEPHONE ENCOUNTER
I do see in your last OV note with the pt you wanted her to be cleared by neurosurgery prior to injection - according to pt it is ok to proceed.  Would you like to place an order for the injection and then I will call pt and do teaching and have JS schedule the injection?  Thank you

## 2024-11-12 PROBLEM — M47.12 CERVICAL SPONDYLOSIS WITH MYELOPATHY: Status: ACTIVE | Noted: 2024-11-07

## 2024-11-13 ENCOUNTER — TELEPHONE (OUTPATIENT)
Dept: PAIN MEDICINE | Facility: HOSPITAL | Age: 44
End: 2024-11-13
Payer: COMMERCIAL

## 2024-11-13 NOTE — TELEPHONE ENCOUNTER
"Called mobile and message sts, \"Can't accept calls at this time.\"  Called home number and left a VM for pt to call the office back.  Olena Sarmiento RN  "

## 2024-11-13 NOTE — TELEPHONE ENCOUNTER
Pt called back and I relayed Merle Graham's message to her and she sts that Dr. Verma's RN called her and told her that since her cervical surgery isn't scheduled until December 19th that she can have an injection at this time.   However, with that being said - I'm not sure how far out injections are being scheduled and we wouldn't want to give steroid too close to surgery, correct?  Pt wanted me to ask you about this?

## 2024-11-14 ENCOUNTER — APPOINTMENT (OUTPATIENT)
Dept: PRIMARY CARE | Facility: CLINIC | Age: 44
End: 2024-11-14
Payer: COMMERCIAL

## 2024-11-14 ENCOUNTER — APPOINTMENT (OUTPATIENT)
Dept: PAIN MEDICINE | Facility: HOSPITAL | Age: 44
End: 2024-11-14
Payer: COMMERCIAL

## 2024-11-14 NOTE — TELEPHONE ENCOUNTER
Called pt and relayed to her that the soonest we could schedule her for the LOVE is too close to her surgery and she cannot have the steroid that close to surgery.  Pt verbalizes understanding.  Olena Sarmiento RN

## 2024-11-22 ENCOUNTER — APPOINTMENT (OUTPATIENT)
Dept: PRIMARY CARE | Facility: CLINIC | Age: 44
End: 2024-11-22
Payer: COMMERCIAL

## 2024-12-05 ENCOUNTER — PATIENT MESSAGE (OUTPATIENT)
Dept: PAIN MEDICINE | Facility: HOSPITAL | Age: 44
End: 2024-12-05
Payer: COMMERCIAL

## 2024-12-06 ENCOUNTER — PRE-ADMISSION TESTING (OUTPATIENT)
Dept: PREADMISSION TESTING | Facility: HOSPITAL | Age: 44
End: 2024-12-06
Payer: COMMERCIAL

## 2024-12-06 VITALS
SYSTOLIC BLOOD PRESSURE: 110 MMHG | DIASTOLIC BLOOD PRESSURE: 68 MMHG | RESPIRATION RATE: 16 BRPM | WEIGHT: 97.88 LBS | OXYGEN SATURATION: 95 % | BODY MASS INDEX: 21.12 KG/M2 | HEIGHT: 57 IN | TEMPERATURE: 97.2 F | HEART RATE: 75 BPM

## 2024-12-06 DIAGNOSIS — Z01.818 PREOPERATIVE EXAMINATION: Primary | ICD-10-CM

## 2024-12-06 DIAGNOSIS — M47.12 CERVICAL SPONDYLOSIS WITH MYELOPATHY: ICD-10-CM

## 2024-12-06 LAB
ALBUMIN SERPL BCP-MCNC: 4 G/DL (ref 3.4–5)
ALP SERPL-CCNC: 62 U/L (ref 33–110)
ALT SERPL W P-5'-P-CCNC: 16 U/L (ref 7–45)
ANION GAP SERPL CALC-SCNC: 10 MMOL/L (ref 10–20)
AST SERPL W P-5'-P-CCNC: 18 U/L (ref 9–39)
BASOPHILS # BLD AUTO: 0.08 X10*3/UL (ref 0–0.1)
BASOPHILS NFR BLD AUTO: 1.2 %
BILIRUB SERPL-MCNC: 0.4 MG/DL (ref 0–1.2)
BUN SERPL-MCNC: 14 MG/DL (ref 6–23)
CALCIUM SERPL-MCNC: 8.2 MG/DL (ref 8.6–10.3)
CHLORIDE SERPL-SCNC: 107 MMOL/L (ref 98–107)
CO2 SERPL-SCNC: 25 MMOL/L (ref 21–32)
CREAT SERPL-MCNC: 0.68 MG/DL (ref 0.5–1.05)
EGFRCR SERPLBLD CKD-EPI 2021: >90 ML/MIN/1.73M*2
EOSINOPHIL # BLD AUTO: 0.34 X10*3/UL (ref 0–0.7)
EOSINOPHIL NFR BLD AUTO: 5.1 %
ERYTHROCYTE [DISTWIDTH] IN BLOOD BY AUTOMATED COUNT: 13.2 % (ref 11.5–14.5)
GLUCOSE SERPL-MCNC: 80 MG/DL (ref 74–99)
HCT VFR BLD AUTO: 35.6 % (ref 36–46)
HGB BLD-MCNC: 11.8 G/DL (ref 12–16)
IMM GRANULOCYTES # BLD AUTO: 0.02 X10*3/UL (ref 0–0.7)
IMM GRANULOCYTES NFR BLD AUTO: 0.3 % (ref 0–0.9)
LYMPHOCYTES # BLD AUTO: 2.44 X10*3/UL (ref 1.2–4.8)
LYMPHOCYTES NFR BLD AUTO: 36.4 %
MCH RBC QN AUTO: 31.9 PG (ref 26–34)
MCHC RBC AUTO-ENTMCNC: 33.1 G/DL (ref 32–36)
MCV RBC AUTO: 96 FL (ref 80–100)
MONOCYTES # BLD AUTO: 0.6 X10*3/UL (ref 0.1–1)
MONOCYTES NFR BLD AUTO: 8.9 %
NEUTROPHILS # BLD AUTO: 3.23 X10*3/UL (ref 1.2–7.7)
NEUTROPHILS NFR BLD AUTO: 48.1 %
NRBC BLD-RTO: 0 /100 WBCS (ref 0–0)
PLATELET # BLD AUTO: 276 X10*3/UL (ref 150–450)
POTASSIUM SERPL-SCNC: 4.3 MMOL/L (ref 3.5–5.3)
PROT SERPL-MCNC: 6.2 G/DL (ref 6.4–8.2)
RBC # BLD AUTO: 3.7 X10*6/UL (ref 4–5.2)
SODIUM SERPL-SCNC: 138 MMOL/L (ref 136–145)
WBC # BLD AUTO: 6.7 X10*3/UL (ref 4.4–11.3)

## 2024-12-06 PROCEDURE — 80053 COMPREHEN METABOLIC PANEL: CPT

## 2024-12-06 PROCEDURE — 87081 CULTURE SCREEN ONLY: CPT | Mod: GEALAB

## 2024-12-06 PROCEDURE — 36415 COLL VENOUS BLD VENIPUNCTURE: CPT

## 2024-12-06 PROCEDURE — 99204 OFFICE O/P NEW MOD 45 MIN: CPT | Performed by: NURSE PRACTITIONER

## 2024-12-06 PROCEDURE — 85025 COMPLETE CBC W/AUTO DIFF WBC: CPT

## 2024-12-06 RX ORDER — CHLORHEXIDINE GLUCONATE 40 MG/ML
1 SOLUTION TOPICAL DAILY
Start: 2024-12-06 | End: 2024-12-11

## 2024-12-06 RX ORDER — CHLORHEXIDINE GLUCONATE ORAL RINSE 1.2 MG/ML
15 SOLUTION DENTAL DAILY
Qty: 30 ML | Refills: 0 | Status: CANCELLED | OUTPATIENT
Start: 2024-12-06 | End: 2024-12-08

## 2024-12-06 RX ORDER — CHLORHEXIDINE GLUCONATE ORAL RINSE 1.2 MG/ML
15 SOLUTION DENTAL DAILY
Qty: 30 ML | Refills: 0 | Status: SHIPPED | OUTPATIENT
Start: 2024-12-06 | End: 2024-12-08

## 2024-12-06 RX ORDER — CHLORHEXIDINE GLUCONATE 40 MG/ML
1 SOLUTION TOPICAL DAILY
Status: CANCELLED
Start: 2024-12-06 | End: 2024-12-11

## 2024-12-06 ASSESSMENT — ENCOUNTER SYMPTOMS
CARDIOVASCULAR NEGATIVE: 1
GASTROINTESTINAL NEGATIVE: 1
NECK STIFFNESS: 1
RESPIRATORY NEGATIVE: 1
NECK SWELLING: 1
CONSTITUTIONAL NEGATIVE: 1
DYSPNEA WITH EXERTION: 1
NECK PAIN: 1
NEUROLOGICAL NEGATIVE: 1

## 2024-12-06 ASSESSMENT — DUKE ACTIVITY SCORE INDEX (DASI)
CAN YOU HAVE SEXUAL RELATIONS: NO
CAN YOU WALK A BLOCK OR TWO ON LEVEL GROUND: YES
CAN YOU PARTICIPATE IN MODERATE RECREATIONAL ACTIVITIES LIKE GOLF, BOWLING, DANCING, DOUBLES TENNIS OR THROWING A BASEBALL OR FOOTBALL: NO
CAN YOU CLIMB A FLIGHT OF STAIRS OR WALK UP A HILL: YES
CAN YOU TAKE CARE OF YOURSELF (EAT, DRESS, BATHE, OR USE TOILET): YES
CAN YOU DO HEAVY WORK AROUND THE HOUSE LIKE SCRUBBING FLOORS OR LIFTING AND MOVING HEAVY FURNITURE: NO
CAN YOU DO YARD WORK LIKE RAKING LEAVES, WEEDING OR PUSHING A MOWER: NO
DASI METS SCORE: 4.6
TOTAL_SCORE: 15.45
CAN YOU PARTICIPATE IN STRENOUS SPORTS LIKE SWIMMING, SINGLES TENNIS, FOOTBALL, BASKETBALL, OR SKIING: NO
CAN YOU WALK INDOORS, SUCH AS AROUND YOUR HOUSE: YES
CAN YOU DO LIGHT WORK AROUND THE HOUSE LIKE DUSTING OR WASHING DISHES: YES
CAN YOU DO MODERATE WORK AROUND THE HOUSE LIKE VACUUMING, SWEEPING FLOORS OR CARRYING GROCERIES: NO
CAN YOU RUN A SHORT DISTANCE: NO

## 2024-12-06 ASSESSMENT — PAIN SCALES - GENERAL: PAINLEVEL_OUTOF10: 7

## 2024-12-06 ASSESSMENT — LIFESTYLE VARIABLES: SMOKING_STATUS: SMOKER

## 2024-12-06 ASSESSMENT — PAIN - FUNCTIONAL ASSESSMENT: PAIN_FUNCTIONAL_ASSESSMENT: 0-10

## 2024-12-06 NOTE — H&P (VIEW-ONLY)
CPM/PAT Evaluation       Name: Cora Solares (Cora Solares)  /Age: 1980/44 y.o.     Visit Type:   In-Person       Chief Complaint: Cervical spondylosis with myelopathy    HPI 45 y/o female scheduled for C3 and C7 laminectomy, C4 C6 cervical laminoplasty on 2024 with  Dr. Verma secondary to cervical spondylosis with myelopathy.  PMHX includes spinal stenosis, asthma, depression, .  PAT is consulted today for perioperative risk stratification and optimization.      Past Medical History:   Diagnosis Date    Acute laryngitis 2016    Acute laryngitis    Asthma     Cervical disc disease     Cutaneous abscess of chest wall 2014    Chest wall abscess    Cutaneous abscess of left lower limb 10/18/2016    Abscess of left thigh    Cutaneous abscess of right lower limb 2014    Abscess of right thigh    Depression     Headache 2021    Noninfective gastroenteritis and colitis, unspecified 10/21/2014    Acute gastroenteritis    Other fecal abnormalities 2016    Loose stools    Personal history of other diseases of the respiratory system 2016    History of acute sinusitis    Personal history of other diseases of the respiratory system 2015    History of influenza    Personal history of other infectious and parasitic diseases 2015    History of herpes labialis    Personal history of other specified conditions 2016    History of nausea    Personal history of other specified conditions 2015    History of dysuria    Personal history of urinary (tract) infections 2014    History of urinary tract infection    Spinal stenosis     Unspecified abdominal pain 2016    Abdominal cramping    Unspecified otitis externa, right ear 2014    Otitis externa of right ear       Past Surgical History:   Procedure Laterality Date    COLONOSCOPY      removed polyp    HYSTERECTOMY  2014    Hysterectomy    MANDIBLE SURGERY       mass removed       Patient   reports that she is not currently sexually active.    Family History   Problem Relation Name Age of Onset    Hypertension Mother Mom     Diabetes Mother Mom     Hypertension Father Dad     Breast cancer Father's Sister      Breast cancer Father's Sister      Breast cancer Father's Sister      Breast cancer Father's Sister      Breast cancer Father's Sister         Allergies   Allergen Reactions    Magnesium Citrate Other     mouth sores    Penicillins Unknown    Sulfa (Sulfonamide Antibiotics) Unknown    Sulfamethoxazole-Trimethoprim Other       Prior to Admission medications    Medication Sig Start Date End Date Taking? Authorizing Provider   albuterol 90 mcg/actuation aerosol powdr breath activated inhaler Inhale 2 puffs every 6 hours. 1/22/20  Yes Historical Provider, MD   budesonide-formoteroL (Symbicort) 160-4.5 mcg/actuation inhaler Inhale 2 puffs 2 times a day. 10/5/22  Yes Historical Provider, MD   ibuprofen 800 mg tablet Take 1 tablet (800 mg) by mouth every 8 hours if needed for mild pain (1 - 3).   Yes Historical Provider, MD   nortriptyline (Pamelor) 10 mg capsule Take 4 capsules (40 mg) by mouth once daily at bedtime. 11/6/24 12/6/24 Yes TRU Coronado, APRN-CNS   tiZANidine (Zanaflex) 2 mg tablet Take 1 tablet (2 mg) by mouth 2 times a day as needed for muscle spasms. 8/20/24 12/6/24 Yes TRU Coronado, APRGURU-CNS        PAT ROS:   Constitutional:   neg    Neuro/Psych:   neg    Eyes:    use of corrective lenses  Ears:   Nose:   Mouth:   Throat:   Neck:    neck pain   neck swelling   neck stiffness  Cardio:   neg     PFEIFFER  Respiratory:   neg    Endocrine:   GI:   neg    :   neg    Musculoskeletal:   Hematologic:   neg    Skin:      Physical Exam  Vitals reviewed.   Constitutional:       Appearance: Normal appearance.   HENT:      Head: Normocephalic and atraumatic.      Mouth/Throat:      Mouth: Mucous membranes are moist.      Pharynx: Oropharynx is clear.   Eyes:      Extraocular  Movements: Extraocular movements intact.      Pupils: Pupils are equal, round, and reactive to light.   Cardiovascular:      Rate and Rhythm: Normal rate and regular rhythm.   Pulmonary:      Effort: Pulmonary effort is normal.      Breath sounds: Normal breath sounds.   Abdominal:      General: Abdomen is flat.      Palpations: Abdomen is soft.   Musculoskeletal:         General: Normal range of motion.      Cervical back: Normal range of motion and neck supple.   Skin:     General: Skin is warm and dry.   Neurological:      General: No focal deficit present.      Mental Status: She is alert and oriented to person, place, and time.   Psychiatric:         Mood and Affect: Mood normal.         Behavior: Behavior normal.          Airway    Testing/Diagnostic:     Patient Specialist/PCP:     Visit Vitals  /68   Pulse 75   Temp 36.2 °C (97.2 °F) (Tympanic)   Resp 16       DASI Risk Score      Flowsheet Row Pre-Admission Testing from 12/6/2024 in [facility]   Can you take care of yourself (eat, dress, bathe, or use toilet)?  2.75 filed at 12/06/2024 1153   Can you walk indoors, such as around your house? 1.75 filed at 12/06/2024 1153   Can you walk a block or two on level ground?  2.75 filed at 12/06/2024 1153   Can you climb a flight of stairs or walk up a hill? 5.5 filed at 12/06/2024 1153   Can you run a short distance? 0 filed at 12/06/2024 1153   Can you do light work around the house like dusting or washing dishes? 2.7 filed at 12/06/2024 1153   Can you do moderate work around the house like vacuuming, sweeping floors or carrying groceries? 0 filed at 12/06/2024 1153   Can you do heavy work around the house like scrubbing floors or lifting and moving heavy furniture?  0 filed at 12/06/2024 1153   Can you do yard work like raking leaves, weeding or pushing a mower? 0 filed at 12/06/2024 1153   Can you have sexual relations? 0 filed at 12/06/2024 1153   Can you participate in moderate recreational  activities like golf, bowling, dancing, doubles tennis or throwing a baseball or football? 0 filed at 12/06/2024 1153   Can you participate in strenous sports like swimming, singles tennis, football, basketball, or skiing? 0 filed at 12/06/2024 1153   DASI SCORE 15.45 filed at 12/06/2024 1153   METS Score (Will be calculated only when all the questions are answered) 4.6 filed at 12/06/2024 1153          Caprini DVT Assessment      Flowsheet Row Pre-Admission Testing from 12/6/2024 in [facility]   DVT Score 4 filed at 12/06/2024 1208   Surgical Factors Major surgery planned, including arthroscopic and laproscopic (1-2 hours) filed at 12/06/2024 1208   BMI 30 or less filed at 12/06/2024 1208          Modified Frailty Index    No data to display       CHADS2 Stroke Risk  Current as of yesterday        N/A 3 to 100%: High Risk   2 to < 3%: Medium Risk   0 to < 2%: Low Risk     Last Change: N/A          This score determines the patient's risk of having a stroke if the patient has atrial fibrillation.        This score is not applicable to this patient. Components are not calculated.          Revised Cardiac Risk Index      Flowsheet Row Pre-Admission Testing from 12/6/2024 in [facility]   High-Risk Surgery (Intraperitoneal, Intrathoracic,Suprainguinal vascular) 0 filed at 12/06/2024 1221   History of ischemic heart disease (History of MI, History of positive exercuse test, Current chest paint considered due to myocardial ischemia, Use of nitrate therapy, ECG with pathological Q Waves) 0 filed at 12/06/2024 1221   History of congestive heart failure (pulmonary edemia, bilateral rales or S3 gallop, Paroxysmal nocturnal dyspnea, CXR showing pulmonary vascular redistribution) 0 filed at 12/06/2024 1221   History of cerebrovascular disease (Prior TIA or stroke) 0 filed at 12/06/2024 1221   Pre-operative insulin treatment 0 filed at 12/06/2024 1221   Pre-operative creatinine>2 mg/dl 0 filed at  2024 1221   Revised Cardiac Risk Calculator 0 filed at 2024 1221          Apfel Simplified Score      Flowsheet Row Pre-Admission Testing from 2024 in [facility]   Smoking status 0 filed at 2024 1209   History of motion sickness or PONV  1 filed at 2024 1209   Use of postoperative opioids 1 filed at 2024 1209   Gender - Female 1=Yes filed at 2024 1209   Apfel Simplified Score Calculator 3 filed at 2024 1209          Risk Analysis Index Results This Encounter         2024  1153             Do you live in a place other than your own home?: 0    When did you begin living in the place you are currently residing?: Greater than one year ago    Any kidney failure, kidney not working well, or seeing a kidney doctor (nephrologist)? If yes, was this for kidney stones or another problem?: 0 No    Any history of chronic (long-term) congestive heart failure (CHF)?: 0 No    Any shortness of breath when resting?: 3 Yes    In the past five years, have you been diagnosed with or treated for cancer?: No    During the last 3 months has it become difficult for you to remember things or organize your thoughts?: 0 No    Have you lost weight of 10 pounds or more in the past 3 months without trying?: 0 No    Do you have any loss of appetitie?: 0 No    Getting Around (Mobility): 0 Can get around without help    Eatin Can plan and prepare own meals    Toiletin Can use toilet without any help    JOINER Cancer History: Patient does not indicate history of cancer          Stop Bang Score      Flowsheet Row Pre-Admission Testing from 2024 in [facility]   Do you snore loudly? 0 filed at 2024 1152   Do you often feel tired or fatigued after your sleep? 0 filed at 2024 1152   Has anyone ever observed you stop breathing in your sleep? 0 filed at 2024 1152   Do you have or are you being treated for high blood pressure? 0 filed at 2024  1152   Recent BMI (Calculated) 21.6 filed at 12/06/2024 1152   Is BMI greater than 35 kg/m2? 0=No filed at 12/06/2024 1152   Age older than 50 years old? 0=No filed at 12/06/2024 1152   Is your neck circumference greater than 17 inches (Male) or 16 inches (Female)? 0 filed at 12/06/2024 1152   Gender - Male 0=No filed at 12/06/2024 1152   STOP-BANG Total Score 0 filed at 12/06/2024 1152          Prodigy: High Risk  Total Score: 0          ARISCAT Score for Postoperative Pulmonary Complications    No data to display       Cooper Perioperative Risk for Myocardial Infarction or Cardiac Arrest (MORENA)    No data to display         Assessment & Plan:  HPI 45 y/o female scheduled for C3 and C7 laminectomy, C4 C6 cervical laminoplasty on 12/19/2024 with  Dr. Verma secondary to cervical spondylosis with myelopathy.  PMHX includes spinal stenosis, asthma, depression, .  PAT is consulted today for perioperative risk stratification and optimization.    Neuro:  No neurologic diagnosis, however, the patient is at increased risk for perioperative delirium secondary to depression  Patient is not at increased risk for perioperative CVA    Depression  Continue nortriptyline    Neurosurgery  Follows with Dr. Verma.  Last seen 11/7/2024 for cervical spondylosis with myelopathy  Plan for partial C3 and C7 laminectomy and C4-C6 cervical laminoplasty     HEENT:  No HEENT diagnosis or significant findings on chart review or clinical presentation and evaluation. No further preoperative testing/intervention indicated at this time.    Cardiovascular:  No CV diagnosis or significant findings on chart review or clinical presentation and evaluation. No further preoperative testing or intervention is indicated at this time.  METS: 4.6  RCRI: 0 points, 3.9%  risk for postoperative MACE       Pulmonary:  No pulmonary diagnosis, however patient is at increased risk of perioperative complications secondary to  Tobacco abuse  Stop Bang score is 0 placing  patient at low risk for FR[place]  PRODIGY: Low risk for opioid induced respiratory depression  Smoking cessation discussed with patient, patient also provided cessation education/hotline handout    Renal:   No renal diagnosis, however patient is at increase risk for perioperative renal complications secondary to  use of an ace, arb, or NSAID      Endocrine:  No endocrine diagnosis or significant findings on chart review or clinical presentation and evaluation. No further testing or intervention is indicated at this time.    Hematologic:  No hematologic diagnosis, however patient is at an increased risk for DVT  Caprini Score 4, patient at High risk for perioperative DVT.  Patient provided with VTE education/handout.    Gastrointestinal:   No GI diagnosis or significant findings on chart review or clinical presentation and evaluation.   Apfel 3    Infectious disease:   No infectious diagnosis or significant findings on chart review or clinical presentation and evaluation.   Prescription provided for CHG body wash and dental rinse. CHG use instructions reviewed and provided to patient.  Staph screen collected    Musculoskeletal:   No diagnosis or significant findings on chart review or clinical presentation and evaluation.     Anesthesia/Airway:  No anesthesia complications      Medication instructions and NPO guidelines reviewed with the patient.  All questions or concerns discussed and addressed.      Holding ibuprofen 1 week prior to procedure     Labs ordered

## 2024-12-06 NOTE — PREPROCEDURE INSTRUCTIONS
Thank you for visiting [facility] ([facility]) today for your pre-procedure evaluation, you were seen by     Mahi Suarez CNP  Pre Admission Testing  [facility]  [phone]    This summary includes instructions and information to aid you during your perioperative period.  Please read carefully. If you have any questions about your visit today, please call the number listed above.  If you become ill or have any changes to your health before your surgery, please contact your primary care provider and alert your surgeon.        Preparing for your Surgery       Exercises  Preoperative Deep Breathing Exercises  Why it is important to do deep breathing exercises before my surgery?  Deep breathing exercises strengthen your breathing muscles.  This helps you to recover after your surgery and decreases the chance of breathing complications.  How are the deep breathing exercises done?  Sit straight with your back supported.  Breathe in deeply and slowly through your nose. Your lower rib cage should expand and your abdomen may move forward.  Hold that breath for 3 to 5 seconds.  Breathe out through pursed lips, slowly and completely.  Rest and repeat 10 times every hour while awake.  Rest longer if you become dizzy or lightheaded.       Preoperative Brain Exercises    What are brain exercises?  A brain exercise is any activity that engages your thinking (cognitive) skills.    What types of activities are considered brain exercises?  Jigsaw puzzles, crossword puzzles, word jumble, memory games, word search, and many more.  Many can be found free online or on your phone via a mobile farida.    Why should I do brain exercises before my surgery?  More recent research has shown brain exercise before surgery can lower the risk of postoperative delirium (confusion) which can be especially important for older adults.  Patients who did brain exercises for 5 to 10 hours the days before surgery, cut their  risk of postoperative delirium in half up to 1 week after surgery.    Sit-to-Stand Exercise    What is the sit-to-stand exercise?  The sit-to-stand exercise strengthens the muscles of your lower body and muscles in the center of your body (core muscles for stability) helping to maintain and improve your strength and mobility.  How do I do the sit-to-stand exercise?  The goal is to do this exercise without using your arms or hands.  If this is too difficult, use your arms and hands or a chair with armrests to help slowly push yourself to the standing position and lower yourself back to the sitting position. As the movement becomes easier use your arms and hands less.    Steps to the sit-to-stand exercise  Sit up tall in a sturdy chair, knees bent, feet flat on the floor shoulder-width apart.  Shift your hips/pelvis forward in the chair to correctly position yourself for the next movement.  Lean forward at your hips.  Stand up straight putting equal weight on both feet.  Check to be sure you are properly aligned with the chair, in a slow controlled movement sit back down.  Repeat this exercise 10-15 times.  If needed you can do it fewer times until your strength improves.  Rest for 1 minute.  Do another 10-15 sit-to-stand exercises.  Try to do this in the morning and evening.        Instructions    Preoperative Fasting Guidelines    Why must I stop eating and drinking near surgery time?  With sedation, food or liquid in your stomach can enter your lungs causing serious complications  Food can increase nausea and vomiting  When do I need to stop eating and drinking before my surgery?      Do not eat any food after midnight the night before your surgery/procedure. You may have up to 13.5 ounces of clear liquid until TWO hours before your instructed arrival time to the hospital.  This includes water, black tea/coffee, (no milk or cream) apple juice, and electrolyte drinks (Gatorade). You may chew gum until TWO hours  before your surgery/procedure        Simple things you can do to help prevent blood clots     Blood clots are blockages that can form in the body's veins. When a blood clot forms in your deep veins, it may be called a deep vein thrombosis, or DVT for short. Blood clots can happen in any part of the body where blood flows, but they are most common in the arms and legs. If a piece of a blood clot breaks free and travels to the lungs, it is called a pulmonary embolus (PE). A PE can be a very serious problem.         Being in the hospital or having surgery can raise your chances of getting a blood clot because you may not be well enough to move around as much as you normally do.         Ways you can help prevent blood clots in the hospital       Wearing SCDs  SCDs stands for Sequential Compression Devices.   SCDs are special sleeves that wrap around your legs. They attach to a pump that fills them with air to gently squeeze your legs every few minutes.  This helps return the blood in your legs to your heart.   SCDs should only be taken off when walking or bathing. SCDs may not be comfortable, but they can help save your life.              Pump SCD leg sleeves  Wearing compression stockings - if your doctor orders them. These special snug-fitting stockings gently squeeze your legs to help blood flow.       Walking. Walking helps move the blood in your legs.   If your doctor says it is ok, try walking the halls at least   5 times a day. Ask us to help you get up, so you don't fall.      Taking any blood-thinning medicines your doctor orders.              Ways you can help prevent blood clots at home         Wearing compression stockings - if your doctor orders them.   Walking - to help move the blood in your legs.    Taking any blood-thinning medicines your doctor orders.      Signs of a blood clot or PE    Tell your doctor or nurse right away if you have any of the problems listed below.         If you are at home, seek  "medical care right away. Call 911 for chest pain or problems breathing.            Signs of a blood clot (DVT) - such as pain, swelling, redness, or warmth in your arm or legs.  Signs of a pulmonary embolism (PE) - such as chest pain or feeling short of breath      Tobacco and Alcohol;  Do not drink alcohol or smoke within 24 hours of surgery.  It is best to quit smoking for as long as possible before any surgery or procedure.  Other Instructions  Why did I have my nose, under my arms, and groin swabbed? The purpose of the swab is to identify Staphylococcus aureus inside your nose or on your skin.  The swab was sent to the laboratory for culture.  A positive swab/culture for Staphylococcus aureus is called colonization or carriage.     What is Staphylococcus aureus? Staphylococcus aureus, also known as \"staph\", is a germ found on the skin or in the nose of healthy people.  Sometimes Staphylococcus aureus can get into the body and cause an infection.  This can be minor (such as pimples, boils, or other skin problems).  It might also be serious (such as a blood infection, pneumonia, or a surgical site infection).     What is Staphylococcus aureus colonization or carriage? Colonization or carriage means that a person has the germ but is not sick from it.  These bacteria can be spread on the hands or when breathing or sneezing.   How is Staphylococcus aureus spread? It is most often spread by close contact with a person or item that carries it.   What happens if my culture is positive for Staphylococcus aureus? Your doctor/medical team will use this information to guide any antibiotic treatment which may be necessary.  Regardless of the culture results, we will clean the inside of your nose with a betadine swab just before you have your surgery.   Will I get an infection if I have Staphylococcus aureus in my nose or on my skin? Anyone can get an infection with Staphylococcus aureus.  However, the best way to reduce your " risk of infection is to follow the instructions provided to you for the use of your CHG soap and dental rinse.      Body Wash:     What is a home preoperative antibacterial shower? This shower is a way of cleaning the skin with a germ-killing solution before surgery.  The solution contains chlorhexidine, commonly known as CHG.  CHG is a skin cleanser with germ-killing ability.  Let your doctor know if you are allergic to chlorhexidine.   Why do I need to take a preoperative antibacterial shower? Skin is not sterile.  It is best to try to make your skin as free of germs as possible before surgery.  Proper cleansing with a germ-killing soap before surgery can lower the number of germs on your skin.  This helps to reduce the risk of infection at the surgical site.    Following the instructions listed below will help you prepare your skin for surgery.   How do I use the solution?  If you plan to wash your hair, use your regular shampoo; then rinse your hair and body thoroughly to remove any shampoo residue  Wash your face with your regular soap or water only  Thoroughly rinse your body with water from the neck down  Apply Hibiclens directly on your skin or on a wet washcloth and wash gently; move away from the shower stream when applying Hibiclens to avoid rinsing it off too soon  Rinse thoroughly with warm water and keep out of eyes, ears and mouth; if Hibiclens comes in contact with these areas, rinse out promptly  Dry your skin with a towel  Do not use your regular soap after applying and rinsing with Hibiclens  Do not apply lotions or deodorants to the cleaned body area      Oral/Dental Rinse:     What is oral/dental rinse?  It is a mouthwash. It is a way of cleaning the mouth with a germ-killing solution before your surgery.  The solution contains chlorhexidine, commonly known as CHG. It is used inside the mouth to kill a bacteria known as Staphylococcus aureus.  Let your doctor know if you are allergic to  Chlorhexidine.   Why do I need to use CHG oral/dental rinse? The CHG oral/dental rinse helps to kill a bacteria in your mouth known as Staphylococcus aureus.  This reduces the risk of infection at the surgical site.    Using your CHG oral/dental rinse STEPS: Use your CHG oral/dental rinse after you brush your teeth the night before (at bedtime) and the morning of your surgery.  Follow all directions on your prescription label.    Use the cap on the container to measure 15 ml.  Swish (gargle if you can) the mouthwash in your mouth for at least 30 seconds, (do not swallow) and spit out.  After you use your CHG rinse, do not rinse your mouth with water, drink or eat.    Please refer to the prescription label for the appropriate time to resume oral intake   What side effects might I have using the CHG oral/dental rinse? CHG rinse will stick to plaque on the teeth.  Brush and floss just before use.  Teeth brushing will help avoid staining of plaque during use.          The Week before Surgery        Seven days before Surgery  Check your PAT medication instructions  Do the exercises provided to you by [facility]  Arrange for a responsible, adult licensed  to take you home after surgery and stay with you for 24 hours.  You will not be permitted to drive yourself home if you have received any anesthetic/sedation  Six days before surgery  Check your PAT medication instructions  Do the exercises provided to you by PAT  Start using Chlorhexidene (CHG) body wash if prescribed  Five days before surgery  Check your PAT medication instructions  Do the exercises provided to you by PAT  Continue to use CHG body wash if prescribed  Three days before surgery  Check your PAT medication instructions  Do the exercises provided to you by PAT  Continue to use CHG body wash if prescribed  Two days before surgery  Check your [facility] medication instructions  Do the exercises provided to you by [facility]  Continue to use CHG body wash if  prescribed    The Day before Surgery       Check your PAT medication and all other PAT instructions including when to stop eating and drinking  You will be called with your arrival time for surgery in the late afternoon.  If you do not receive a call please reach out to [facility]. [phone]  Do not smoke or drink 24 hours before surgery  Prepare items to bring with you to the hospital  Shower with your chlorhexidine wash if prescribed  Brush your teeth and use your chlorhexidine dental rinse if prescribed    The Day of Surgery       Check your PAT medication instructions  Ensure you follow the instructions for when to stop eating and drinking  Shower, if prescribed use CHG.  Do not apply any lotions, creams, moisturizers, perfume or deodorant  Brush your teeth and use your CHG dental rinse if prescribed  Wear loose comfortable clothing  Avoid make-up  Remove  jewelry and piercings, consider professional piercing removal with a plastic spacer if needed  Bring photo ID and Insurance card  Bring an accurate medication list that includes medication dose, frequency and allergies  Bring a copy of your advanced directives (will, health care power of )  Bring any devices and controllers as well as medical devices you have been provided with for surgery (CPAP, slings, braces, etc.)  Dentures, eyeglasses, and contacts will be removed before surgery, please bring cases for contacts or glasses

## 2024-12-06 NOTE — CPM/PAT H&P
CPM/PAT Evaluation       Name: Cora Solares (Cora Solares)  /Age: 1980/44 y.o.     Visit Type:   In-Person       Chief Complaint: Cervical spondylosis with myelopathy    HPI 45 y/o female scheduled for C3 and C7 laminectomy, C4 C6 cervical laminoplasty on 2024 with  Dr. Verma secondary to cervical spondylosis with myelopathy.  PMHX includes spinal stenosis, asthma, depression, .  PAT is consulted today for perioperative risk stratification and optimization.      Past Medical History:   Diagnosis Date    Acute laryngitis 2016    Acute laryngitis    Asthma     Cervical disc disease     Cutaneous abscess of chest wall 2014    Chest wall abscess    Cutaneous abscess of left lower limb 10/18/2016    Abscess of left thigh    Cutaneous abscess of right lower limb 2014    Abscess of right thigh    Depression     Headache 2021    Noninfective gastroenteritis and colitis, unspecified 10/21/2014    Acute gastroenteritis    Other fecal abnormalities 2016    Loose stools    Personal history of other diseases of the respiratory system 2016    History of acute sinusitis    Personal history of other diseases of the respiratory system 2015    History of influenza    Personal history of other infectious and parasitic diseases 2015    History of herpes labialis    Personal history of other specified conditions 2016    History of nausea    Personal history of other specified conditions 2015    History of dysuria    Personal history of urinary (tract) infections 2014    History of urinary tract infection    Spinal stenosis     Unspecified abdominal pain 2016    Abdominal cramping    Unspecified otitis externa, right ear 2014    Otitis externa of right ear       Past Surgical History:   Procedure Laterality Date    COLONOSCOPY      removed polyp    HYSTERECTOMY  2014    Hysterectomy    MANDIBLE SURGERY       mass removed       Patient   reports that she is not currently sexually active.    Family History   Problem Relation Name Age of Onset    Hypertension Mother Mom     Diabetes Mother Mom     Hypertension Father Dad     Breast cancer Father's Sister      Breast cancer Father's Sister      Breast cancer Father's Sister      Breast cancer Father's Sister      Breast cancer Father's Sister         Allergies   Allergen Reactions    Magnesium Citrate Other     mouth sores    Penicillins Unknown    Sulfa (Sulfonamide Antibiotics) Unknown    Sulfamethoxazole-Trimethoprim Other       Prior to Admission medications    Medication Sig Start Date End Date Taking? Authorizing Provider   albuterol 90 mcg/actuation aerosol powdr breath activated inhaler Inhale 2 puffs every 6 hours. 1/22/20  Yes Historical Provider, MD   budesonide-formoteroL (Symbicort) 160-4.5 mcg/actuation inhaler Inhale 2 puffs 2 times a day. 10/5/22  Yes Historical Provider, MD   ibuprofen 800 mg tablet Take 1 tablet (800 mg) by mouth every 8 hours if needed for mild pain (1 - 3).   Yes Historical Provider, MD   nortriptyline (Pamelor) 10 mg capsule Take 4 capsules (40 mg) by mouth once daily at bedtime. 11/6/24 12/6/24 Yes TRU Coronado, APRN-CNS   tiZANidine (Zanaflex) 2 mg tablet Take 1 tablet (2 mg) by mouth 2 times a day as needed for muscle spasms. 8/20/24 12/6/24 Yes TRU Coronado, APRGURU-CNS        PAT ROS:   Constitutional:   neg    Neuro/Psych:   neg    Eyes:    use of corrective lenses  Ears:   Nose:   Mouth:   Throat:   Neck:    neck pain   neck swelling   neck stiffness  Cardio:   neg     PFEIFFER  Respiratory:   neg    Endocrine:   GI:   neg    :   neg    Musculoskeletal:   Hematologic:   neg    Skin:      Physical Exam  Vitals reviewed.   Constitutional:       Appearance: Normal appearance.   HENT:      Head: Normocephalic and atraumatic.      Mouth/Throat:      Mouth: Mucous membranes are moist.      Pharynx: Oropharynx is clear.   Eyes:      Extraocular  Movements: Extraocular movements intact.      Pupils: Pupils are equal, round, and reactive to light.   Cardiovascular:      Rate and Rhythm: Normal rate and regular rhythm.   Pulmonary:      Effort: Pulmonary effort is normal.      Breath sounds: Normal breath sounds.   Abdominal:      General: Abdomen is flat.      Palpations: Abdomen is soft.   Musculoskeletal:         General: Normal range of motion.      Cervical back: Normal range of motion and neck supple.   Skin:     General: Skin is warm and dry.   Neurological:      General: No focal deficit present.      Mental Status: She is alert and oriented to person, place, and time.   Psychiatric:         Mood and Affect: Mood normal.         Behavior: Behavior normal.          Airway    Testing/Diagnostic:     Patient Specialist/PCP:     Visit Vitals  /68   Pulse 75   Temp 36.2 °C (97.2 °F) (Tympanic)   Resp 16       DASI Risk Score      Flowsheet Row Pre-Admission Testing from 12/6/2024 in [facility]   Can you take care of yourself (eat, dress, bathe, or use toilet)?  2.75 filed at 12/06/2024 1153   Can you walk indoors, such as around your house? 1.75 filed at 12/06/2024 1153   Can you walk a block or two on level ground?  2.75 filed at 12/06/2024 1153   Can you climb a flight of stairs or walk up a hill? 5.5 filed at 12/06/2024 1153   Can you run a short distance? 0 filed at 12/06/2024 1153   Can you do light work around the house like dusting or washing dishes? 2.7 filed at 12/06/2024 1153   Can you do moderate work around the house like vacuuming, sweeping floors or carrying groceries? 0 filed at 12/06/2024 1153   Can you do heavy work around the house like scrubbing floors or lifting and moving heavy furniture?  0 filed at 12/06/2024 1153   Can you do yard work like raking leaves, weeding or pushing a mower? 0 filed at 12/06/2024 1153   Can you have sexual relations? 0 filed at 12/06/2024 1153   Can you participate in moderate recreational  activities like golf, bowling, dancing, doubles tennis or throwing a baseball or football? 0 filed at 12/06/2024 1153   Can you participate in strenous sports like swimming, singles tennis, football, basketball, or skiing? 0 filed at 12/06/2024 1153   DASI SCORE 15.45 filed at 12/06/2024 1153   METS Score (Will be calculated only when all the questions are answered) 4.6 filed at 12/06/2024 1153          Caprini DVT Assessment      Flowsheet Row Pre-Admission Testing from 12/6/2024 in [facility]   DVT Score 4 filed at 12/06/2024 1208   Surgical Factors Major surgery planned, including arthroscopic and laproscopic (1-2 hours) filed at 12/06/2024 1208   BMI 30 or less filed at 12/06/2024 1208          Modified Frailty Index    No data to display       CHADS2 Stroke Risk  Current as of yesterday        N/A 3 to 100%: High Risk   2 to < 3%: Medium Risk   0 to < 2%: Low Risk     Last Change: N/A          This score determines the patient's risk of having a stroke if the patient has atrial fibrillation.        This score is not applicable to this patient. Components are not calculated.          Revised Cardiac Risk Index      Flowsheet Row Pre-Admission Testing from 12/6/2024 in [facility]   High-Risk Surgery (Intraperitoneal, Intrathoracic,Suprainguinal vascular) 0 filed at 12/06/2024 1221   History of ischemic heart disease (History of MI, History of positive exercuse test, Current chest paint considered due to myocardial ischemia, Use of nitrate therapy, ECG with pathological Q Waves) 0 filed at 12/06/2024 1221   History of congestive heart failure (pulmonary edemia, bilateral rales or S3 gallop, Paroxysmal nocturnal dyspnea, CXR showing pulmonary vascular redistribution) 0 filed at 12/06/2024 1221   History of cerebrovascular disease (Prior TIA or stroke) 0 filed at 12/06/2024 1221   Pre-operative insulin treatment 0 filed at 12/06/2024 1221   Pre-operative creatinine>2 mg/dl 0 filed at  2024 1221   Revised Cardiac Risk Calculator 0 filed at 2024 1221          Apfel Simplified Score      Flowsheet Row Pre-Admission Testing from 2024 in [facility]   Smoking status 0 filed at 2024 1209   History of motion sickness or PONV  1 filed at 2024 1209   Use of postoperative opioids 1 filed at 2024 1209   Gender - Female 1=Yes filed at 2024 1209   Apfel Simplified Score Calculator 3 filed at 2024 1209          Risk Analysis Index Results This Encounter         2024  1153             Do you live in a place other than your own home?: 0    When did you begin living in the place you are currently residing?: Greater than one year ago    Any kidney failure, kidney not working well, or seeing a kidney doctor (nephrologist)? If yes, was this for kidney stones or another problem?: 0 No    Any history of chronic (long-term) congestive heart failure (CHF)?: 0 No    Any shortness of breath when resting?: 3 Yes    In the past five years, have you been diagnosed with or treated for cancer?: No    During the last 3 months has it become difficult for you to remember things or organize your thoughts?: 0 No    Have you lost weight of 10 pounds or more in the past 3 months without trying?: 0 No    Do you have any loss of appetitie?: 0 No    Getting Around (Mobility): 0 Can get around without help    Eatin Can plan and prepare own meals    Toiletin Can use toilet without any help    JOINER Cancer History: Patient does not indicate history of cancer          Stop Bang Score      Flowsheet Row Pre-Admission Testing from 2024 in [facility]   Do you snore loudly? 0 filed at 2024 1152   Do you often feel tired or fatigued after your sleep? 0 filed at 2024 1152   Has anyone ever observed you stop breathing in your sleep? 0 filed at 2024 1152   Do you have or are you being treated for high blood pressure? 0 filed at 2024  1152   Recent BMI (Calculated) 21.6 filed at 12/06/2024 1152   Is BMI greater than 35 kg/m2? 0=No filed at 12/06/2024 1152   Age older than 50 years old? 0=No filed at 12/06/2024 1152   Is your neck circumference greater than 17 inches (Male) or 16 inches (Female)? 0 filed at 12/06/2024 1152   Gender - Male 0=No filed at 12/06/2024 1152   STOP-BANG Total Score 0 filed at 12/06/2024 1152          Prodigy: High Risk  Total Score: 0          ARISCAT Score for Postoperative Pulmonary Complications    No data to display       Cooper Perioperative Risk for Myocardial Infarction or Cardiac Arrest (MORENA)    No data to display         Assessment & Plan:  HPI 43 y/o female scheduled for C3 and C7 laminectomy, C4 C6 cervical laminoplasty on 12/19/2024 with  Dr. Verma secondary to cervical spondylosis with myelopathy.  PMHX includes spinal stenosis, asthma, depression, .  PAT is consulted today for perioperative risk stratification and optimization.    Neuro:  No neurologic diagnosis, however, the patient is at increased risk for perioperative delirium secondary to depression  Patient is not at increased risk for perioperative CVA    Depression  Continue nortriptyline    Neurosurgery  Follows with Dr. Verma.  Last seen 11/7/2024 for cervical spondylosis with myelopathy  Plan for partial C3 and C7 laminectomy and C4-C6 cervical laminoplasty     HEENT:  No HEENT diagnosis or significant findings on chart review or clinical presentation and evaluation. No further preoperative testing/intervention indicated at this time.    Cardiovascular:  No CV diagnosis or significant findings on chart review or clinical presentation and evaluation. No further preoperative testing or intervention is indicated at this time.  METS: 4.6  RCRI: 0 points, 3.9%  risk for postoperative MACE       Pulmonary:  No pulmonary diagnosis, however patient is at increased risk of perioperative complications secondary to  Tobacco abuse  Stop Bang score is 0 placing  patient at low risk for FRANCE  PRODIGY: Low risk for opioid induced respiratory depression  Smoking cessation discussed with patient, patient also provided cessation education/hotline handout    Renal:   No renal diagnosis, however patient is at increase risk for perioperative renal complications secondary to  use of an ace, arb, or NSAID      Endocrine:  No endocrine diagnosis or significant findings on chart review or clinical presentation and evaluation. No further testing or intervention is indicated at this time.    Hematologic:  No hematologic diagnosis, however patient is at an increased risk for DVT  Caprini Score 4, patient at High risk for perioperative DVT.  Patient provided with VTE education/handout.    Gastrointestinal:   No GI diagnosis or significant findings on chart review or clinical presentation and evaluation.   Apfel 3    Infectious disease:   No infectious diagnosis or significant findings on chart review or clinical presentation and evaluation.   Prescription provided for CHG body wash and dental rinse. CHG use instructions reviewed and provided to patient.  Staph screen collected    Musculoskeletal:   No diagnosis or significant findings on chart review or clinical presentation and evaluation.     Anesthesia/Airway:  No anesthesia complications      Medication instructions and NPO guidelines reviewed with the patient.  All questions or concerns discussed and addressed.      Holding ibuprofen 1 week prior to procedure     Labs ordered

## 2024-12-08 LAB — STAPHYLOCOCCUS SPEC CULT: NORMAL

## 2024-12-09 ENCOUNTER — TELEPHONE (OUTPATIENT)
Dept: PAIN MEDICINE | Facility: HOSPITAL | Age: 44
End: 2024-12-09
Payer: COMMERCIAL

## 2024-12-16 ENCOUNTER — APPOINTMENT (OUTPATIENT)
Dept: PRIMARY CARE | Facility: CLINIC | Age: 44
End: 2024-12-16
Payer: COMMERCIAL

## 2024-12-17 ENCOUNTER — ANESTHESIA EVENT (OUTPATIENT)
Dept: OPERATING ROOM | Facility: HOSPITAL | Age: 44
End: 2024-12-17
Payer: COMMERCIAL

## 2024-12-19 ENCOUNTER — ANESTHESIA (OUTPATIENT)
Dept: OPERATING ROOM | Facility: HOSPITAL | Age: 44
End: 2024-12-19
Payer: COMMERCIAL

## 2024-12-19 ENCOUNTER — PHARMACY VISIT (OUTPATIENT)
Dept: PHARMACY | Facility: CLINIC | Age: 44
End: 2024-12-19
Payer: COMMERCIAL

## 2024-12-19 ENCOUNTER — HOSPITAL ENCOUNTER (OUTPATIENT)
Facility: HOSPITAL | Age: 44
Discharge: HOME | End: 2024-12-21
Attending: NEUROLOGICAL SURGERY | Admitting: NEUROLOGICAL SURGERY
Payer: COMMERCIAL

## 2024-12-19 ENCOUNTER — APPOINTMENT (OUTPATIENT)
Dept: RADIOLOGY | Facility: HOSPITAL | Age: 44
End: 2024-12-19
Payer: COMMERCIAL

## 2024-12-19 DIAGNOSIS — M54.12 CERVICAL RADICULOPATHY: ICD-10-CM

## 2024-12-19 DIAGNOSIS — M62.838 MUSCLE SPASM: ICD-10-CM

## 2024-12-19 DIAGNOSIS — M48.02 CERVICAL STENOSIS OF SPINAL CANAL: ICD-10-CM

## 2024-12-19 DIAGNOSIS — M47.12 CERVICAL SPONDYLOSIS WITH MYELOPATHY: Primary | ICD-10-CM

## 2024-12-19 LAB
ABO GROUP (TYPE) IN BLOOD: NORMAL
ABO GROUP (TYPE) IN BLOOD: NORMAL
ANTIBODY SCREEN: NORMAL
RH FACTOR (ANTIGEN D): NORMAL
RH FACTOR (ANTIGEN D): NORMAL

## 2024-12-19 PROCEDURE — A6010 COLLAGEN BASED WOUND FILLER: HCPCS | Performed by: NEUROLOGICAL SURGERY

## 2024-12-19 PROCEDURE — 7100000001 HC RECOVERY ROOM TIME - INITIAL BASE CHARGE: Performed by: NEUROLOGICAL SURGERY

## 2024-12-19 PROCEDURE — 7100000002 HC RECOVERY ROOM TIME - EACH INCREMENTAL 1 MINUTE: Performed by: NEUROLOGICAL SURGERY

## 2024-12-19 PROCEDURE — 3600000018 HC OR TIME - INITIAL BASE CHARGE - PROCEDURE LEVEL SIX: Performed by: NEUROLOGICAL SURGERY

## 2024-12-19 PROCEDURE — 2500000002 HC RX 250 W HCPCS SELF ADMINISTERED DRUGS (ALT 637 FOR MEDICARE OP, ALT 636 FOR OP/ED): Performed by: NURSE PRACTITIONER

## 2024-12-19 PROCEDURE — 2500000004 HC RX 250 GENERAL PHARMACY W/ HCPCS (ALT 636 FOR OP/ED): Performed by: ANESTHESIOLOGY

## 2024-12-19 PROCEDURE — 94664 DEMO&/EVAL PT USE INHALER: CPT

## 2024-12-19 PROCEDURE — 63050 CERVICAL LAMINOPLSTY 2/> SEG: CPT | Performed by: NEUROLOGICAL SURGERY

## 2024-12-19 PROCEDURE — 76000 FLUOROSCOPY <1 HR PHYS/QHP: CPT

## 2024-12-19 PROCEDURE — RXMED WILLOW AMBULATORY MEDICATION CHARGE

## 2024-12-19 PROCEDURE — 9420000001 HC RT PATIENT EDUCATION 5 MIN

## 2024-12-19 PROCEDURE — 3700000001 HC GENERAL ANESTHESIA TIME - INITIAL BASE CHARGE: Performed by: NEUROLOGICAL SURGERY

## 2024-12-19 PROCEDURE — 3600000017 HC OR TIME - EACH INCREMENTAL 1 MINUTE - PROCEDURE LEVEL SIX: Performed by: NEUROLOGICAL SURGERY

## 2024-12-19 PROCEDURE — 2500000005 HC RX 250 GENERAL PHARMACY W/O HCPCS: Performed by: NEUROLOGICAL SURGERY

## 2024-12-19 PROCEDURE — 2500000005 HC RX 250 GENERAL PHARMACY W/O HCPCS: Performed by: NURSE ANESTHETIST, CERTIFIED REGISTERED

## 2024-12-19 PROCEDURE — 2780000003 HC OR 278 NO HCPCS: Performed by: NEUROLOGICAL SURGERY

## 2024-12-19 PROCEDURE — 94640 AIRWAY INHALATION TREATMENT: CPT

## 2024-12-19 PROCEDURE — 94760 N-INVAS EAR/PLS OXIMETRY 1: CPT

## 2024-12-19 PROCEDURE — 2500000004 HC RX 250 GENERAL PHARMACY W/ HCPCS (ALT 636 FOR OP/ED): Performed by: NEUROLOGICAL SURGERY

## 2024-12-19 PROCEDURE — 2500000001 HC RX 250 WO HCPCS SELF ADMINISTERED DRUGS (ALT 637 FOR MEDICARE OP): Performed by: NURSE PRACTITIONER

## 2024-12-19 PROCEDURE — 2500000004 HC RX 250 GENERAL PHARMACY W/ HCPCS (ALT 636 FOR OP/ED): Performed by: NURSE ANESTHETIST, CERTIFIED REGISTERED

## 2024-12-19 PROCEDURE — 7100000011 HC EXTENDED STAY RECOVERY HOURLY - NURSING UNIT

## 2024-12-19 PROCEDURE — A63015 PR LAMINECTOMY,>2 SGMT,CERVICAL: Performed by: ANESTHESIOLOGY

## 2024-12-19 PROCEDURE — 2500000004 HC RX 250 GENERAL PHARMACY W/ HCPCS (ALT 636 FOR OP/ED): Performed by: NURSE PRACTITIONER

## 2024-12-19 PROCEDURE — 2720000007 HC OR 272 NO HCPCS: Performed by: NEUROLOGICAL SURGERY

## 2024-12-19 PROCEDURE — A63015 PR LAMINECTOMY,>2 SGMT,CERVICAL: Performed by: NURSE ANESTHETIST, CERTIFIED REGISTERED

## 2024-12-19 PROCEDURE — C1713 ANCHOR/SCREW BN/BN,TIS/BN: HCPCS | Performed by: NEUROLOGICAL SURGERY

## 2024-12-19 PROCEDURE — 3700000002 HC GENERAL ANESTHESIA TIME - EACH INCREMENTAL 1 MINUTE: Performed by: NEUROLOGICAL SURGERY

## 2024-12-19 PROCEDURE — 2500000001 HC RX 250 WO HCPCS SELF ADMINISTERED DRUGS (ALT 637 FOR MEDICARE OP): Performed by: ANESTHESIOLOGY

## 2024-12-19 PROCEDURE — 36415 COLL VENOUS BLD VENIPUNCTURE: CPT | Performed by: ANESTHESIOLOGY

## 2024-12-19 PROCEDURE — 86901 BLOOD TYPING SEROLOGIC RH(D): CPT | Performed by: ANESTHESIOLOGY

## 2024-12-19 DEVICE — SCREW, LEVERAGE, LATERAL MASS, 2.6 X 7MM: Type: IMPLANTABLE DEVICE | Site: SPINE CERVICAL | Status: FUNCTIONAL

## 2024-12-19 DEVICE — SCREW, LEVERAGE 2.6 X 5MM, LAMINER: Type: IMPLANTABLE DEVICE | Site: SPINE CERVICAL | Status: FUNCTIONAL

## 2024-12-19 DEVICE — PLATE, LEVERAGE, 8MM GRAFT C-C: Type: IMPLANTABLE DEVICE | Site: SPINE CERVICAL | Status: FUNCTIONAL

## 2024-12-19 RX ORDER — POLYETHYLENE GLYCOL 3350 17 G/17G
17 POWDER, FOR SOLUTION ORAL DAILY
Qty: 238 G | Refills: 0 | Status: SHIPPED | OUTPATIENT
Start: 2024-12-19

## 2024-12-19 RX ORDER — IPRATROPIUM BROMIDE AND ALBUTEROL SULFATE 2.5; .5 MG/3ML; MG/3ML
3 SOLUTION RESPIRATORY (INHALATION) EVERY 6 HOURS PRN
Status: DISCONTINUED | OUTPATIENT
Start: 2024-12-19 | End: 2024-12-19

## 2024-12-19 RX ORDER — OXYCODONE HYDROCHLORIDE 5 MG/1
5 TABLET ORAL EVERY 4 HOURS PRN
Status: DISCONTINUED | OUTPATIENT
Start: 2024-12-19 | End: 2024-12-21 | Stop reason: HOSPADM

## 2024-12-19 RX ORDER — ROCURONIUM BROMIDE 10 MG/ML
INJECTION, SOLUTION INTRAVENOUS AS NEEDED
Status: DISCONTINUED | OUTPATIENT
Start: 2024-12-19 | End: 2024-12-19

## 2024-12-19 RX ORDER — NALOXONE HYDROCHLORIDE 0.4 MG/ML
0.2 INJECTION, SOLUTION INTRAMUSCULAR; INTRAVENOUS; SUBCUTANEOUS EVERY 5 MIN PRN
Status: DISCONTINUED | OUTPATIENT
Start: 2024-12-19 | End: 2024-12-21 | Stop reason: HOSPADM

## 2024-12-19 RX ORDER — OXYCODONE HYDROCHLORIDE 10 MG/1
10 TABLET ORAL EVERY 4 HOURS PRN
Status: DISCONTINUED | OUTPATIENT
Start: 2024-12-19 | End: 2024-12-21 | Stop reason: HOSPADM

## 2024-12-19 RX ORDER — ONDANSETRON 4 MG/1
4 TABLET, FILM COATED ORAL EVERY 8 HOURS PRN
Status: DISCONTINUED | OUTPATIENT
Start: 2024-12-19 | End: 2024-12-21 | Stop reason: HOSPADM

## 2024-12-19 RX ORDER — PROPOFOL 10 MG/ML
INJECTION, EMULSION INTRAVENOUS AS NEEDED
Status: DISCONTINUED | OUTPATIENT
Start: 2024-12-19 | End: 2024-12-19

## 2024-12-19 RX ORDER — OXYCODONE HYDROCHLORIDE 5 MG/1
5 TABLET ORAL EVERY 4 HOURS PRN
Status: DISCONTINUED | OUTPATIENT
Start: 2024-12-19 | End: 2024-12-19 | Stop reason: HOSPADM

## 2024-12-19 RX ORDER — DEXTROSE 50 % IN WATER (D50W) INTRAVENOUS SYRINGE
12.5
Status: DISCONTINUED | OUTPATIENT
Start: 2024-12-19 | End: 2024-12-21 | Stop reason: HOSPADM

## 2024-12-19 RX ORDER — SODIUM CHLORIDE, SODIUM LACTATE, POTASSIUM CHLORIDE, CALCIUM CHLORIDE 600; 310; 30; 20 MG/100ML; MG/100ML; MG/100ML; MG/100ML
100 INJECTION, SOLUTION INTRAVENOUS CONTINUOUS
Status: DISCONTINUED | OUTPATIENT
Start: 2024-12-19 | End: 2024-12-19 | Stop reason: HOSPADM

## 2024-12-19 RX ORDER — ALBUTEROL SULFATE 0.83 MG/ML
2.5 SOLUTION RESPIRATORY (INHALATION) ONCE AS NEEDED
Status: DISCONTINUED | OUTPATIENT
Start: 2024-12-19 | End: 2024-12-19 | Stop reason: HOSPADM

## 2024-12-19 RX ORDER — ONDANSETRON HYDROCHLORIDE 2 MG/ML
4 INJECTION, SOLUTION INTRAVENOUS EVERY 8 HOURS PRN
Status: DISCONTINUED | OUTPATIENT
Start: 2024-12-19 | End: 2024-12-21 | Stop reason: HOSPADM

## 2024-12-19 RX ORDER — SODIUM CHLORIDE, SODIUM LACTATE, POTASSIUM CHLORIDE, CALCIUM CHLORIDE 600; 310; 30; 20 MG/100ML; MG/100ML; MG/100ML; MG/100ML
20 INJECTION, SOLUTION INTRAVENOUS CONTINUOUS
Status: ACTIVE | OUTPATIENT
Start: 2024-12-19 | End: 2024-12-20

## 2024-12-19 RX ORDER — DROPERIDOL 2.5 MG/ML
0.62 INJECTION, SOLUTION INTRAMUSCULAR; INTRAVENOUS ONCE AS NEEDED
Status: DISCONTINUED | OUTPATIENT
Start: 2024-12-19 | End: 2024-12-19 | Stop reason: HOSPADM

## 2024-12-19 RX ORDER — POLYETHYLENE GLYCOL 3350 17 G/17G
17 POWDER, FOR SOLUTION ORAL DAILY
Status: DISCONTINUED | OUTPATIENT
Start: 2024-12-19 | End: 2024-12-21 | Stop reason: HOSPADM

## 2024-12-19 RX ORDER — METHOCARBAMOL 100 MG/ML
1000 INJECTION, SOLUTION INTRAMUSCULAR; INTRAVENOUS ONCE
Status: COMPLETED | OUTPATIENT
Start: 2024-12-19 | End: 2024-12-19

## 2024-12-19 RX ORDER — DOCUSATE SODIUM 100 MG/1
100 CAPSULE, LIQUID FILLED ORAL 2 TIMES DAILY
Qty: 20 CAPSULE | Refills: 0 | Status: SHIPPED | OUTPATIENT
Start: 2024-12-19 | End: 2024-12-29

## 2024-12-19 RX ORDER — SODIUM CHLORIDE, SODIUM LACTATE, POTASSIUM CHLORIDE, CALCIUM CHLORIDE 600; 310; 30; 20 MG/100ML; MG/100ML; MG/100ML; MG/100ML
100 INJECTION, SOLUTION INTRAVENOUS CONTINUOUS
Status: DISCONTINUED | OUTPATIENT
Start: 2024-12-19 | End: 2024-12-20

## 2024-12-19 RX ORDER — MEPERIDINE HYDROCHLORIDE 25 MG/ML
12.5 INJECTION INTRAMUSCULAR; INTRAVENOUS; SUBCUTANEOUS EVERY 10 MIN PRN
Status: DISCONTINUED | OUTPATIENT
Start: 2024-12-19 | End: 2024-12-19 | Stop reason: HOSPADM

## 2024-12-19 RX ORDER — FORMOTEROL FUMARATE DIHYDRATE 20 UG/2ML
20 SOLUTION RESPIRATORY (INHALATION)
Status: DISCONTINUED | OUTPATIENT
Start: 2024-12-19 | End: 2024-12-21 | Stop reason: HOSPADM

## 2024-12-19 RX ORDER — LIDOCAINE HYDROCHLORIDE AND EPINEPHRINE 10; 10 UG/ML; MG/ML
INJECTION, SOLUTION INFILTRATION; PERINEURAL AS NEEDED
Status: DISCONTINUED | OUTPATIENT
Start: 2024-12-19 | End: 2024-12-19 | Stop reason: HOSPADM

## 2024-12-19 RX ORDER — NORTRIPTYLINE HYDROCHLORIDE 10 MG/1
40 CAPSULE ORAL NIGHTLY
Status: DISCONTINUED | OUTPATIENT
Start: 2024-12-19 | End: 2024-12-21 | Stop reason: HOSPADM

## 2024-12-19 RX ORDER — CYCLOBENZAPRINE HCL 10 MG
10 TABLET ORAL 3 TIMES DAILY PRN
Status: DISCONTINUED | OUTPATIENT
Start: 2024-12-19 | End: 2024-12-19

## 2024-12-19 RX ORDER — BUDESONIDE 0.5 MG/2ML
0.5 INHALANT ORAL
Status: DISCONTINUED | OUTPATIENT
Start: 2024-12-19 | End: 2024-12-21 | Stop reason: HOSPADM

## 2024-12-19 RX ORDER — CEFAZOLIN SODIUM 2 G/50ML
2 SOLUTION INTRAVENOUS EVERY 6 HOURS
Status: DISCONTINUED | OUTPATIENT
Start: 2024-12-19 | End: 2024-12-20

## 2024-12-19 RX ORDER — OXYCODONE AND ACETAMINOPHEN 5; 325 MG/1; MG/1
1 TABLET ORAL EVERY 4 HOURS PRN
Qty: 36 TABLET | Refills: 0 | Status: SHIPPED | OUTPATIENT
Start: 2024-12-19 | End: 2024-12-25

## 2024-12-19 RX ORDER — LIDOCAINE HYDROCHLORIDE 20 MG/ML
INJECTION, SOLUTION INFILTRATION; PERINEURAL AS NEEDED
Status: DISCONTINUED | OUTPATIENT
Start: 2024-12-19 | End: 2024-12-19

## 2024-12-19 RX ORDER — CYCLOBENZAPRINE HCL 10 MG
10 TABLET ORAL 3 TIMES DAILY PRN
Qty: 30 TABLET | Refills: 0 | Status: SHIPPED | OUTPATIENT
Start: 2024-12-19 | End: 2024-12-20 | Stop reason: HOSPADM

## 2024-12-19 RX ORDER — BUPIVACAINE HYDROCHLORIDE 5 MG/ML
INJECTION, SOLUTION EPIDURAL; INTRACAUDAL AS NEEDED
Status: DISCONTINUED | OUTPATIENT
Start: 2024-12-19 | End: 2024-12-19 | Stop reason: HOSPADM

## 2024-12-19 RX ORDER — ESMOLOL HYDROCHLORIDE 10 MG/ML
INJECTION INTRAVENOUS AS NEEDED
Status: DISCONTINUED | OUTPATIENT
Start: 2024-12-19 | End: 2024-12-19

## 2024-12-19 RX ORDER — ACETAMINOPHEN 325 MG/1
650 TABLET ORAL EVERY 6 HOURS
Status: DISCONTINUED | OUTPATIENT
Start: 2024-12-19 | End: 2024-12-21 | Stop reason: HOSPADM

## 2024-12-19 RX ORDER — BACITRACIN 500 [USP'U]/G
OINTMENT TOPICAL AS NEEDED
Status: DISCONTINUED | OUTPATIENT
Start: 2024-12-19 | End: 2024-12-19 | Stop reason: HOSPADM

## 2024-12-19 RX ORDER — FLUTICASONE FUROATE AND VILANTEROL 200; 25 UG/1; UG/1
1 POWDER RESPIRATORY (INHALATION)
Status: DISCONTINUED | OUTPATIENT
Start: 2024-12-19 | End: 2024-12-19 | Stop reason: CLARIF

## 2024-12-19 RX ORDER — DEXTROSE 50 % IN WATER (D50W) INTRAVENOUS SYRINGE
25
Status: DISCONTINUED | OUTPATIENT
Start: 2024-12-19 | End: 2024-12-21 | Stop reason: HOSPADM

## 2024-12-19 RX ORDER — CEFAZOLIN 1 G/1
INJECTION, POWDER, FOR SOLUTION INTRAVENOUS AS NEEDED
Status: DISCONTINUED | OUTPATIENT
Start: 2024-12-19 | End: 2024-12-19

## 2024-12-19 RX ORDER — LIDOCAINE HYDROCHLORIDE 40 MG/ML
SOLUTION TOPICAL AS NEEDED
Status: DISCONTINUED | OUTPATIENT
Start: 2024-12-19 | End: 2024-12-19

## 2024-12-19 RX ORDER — DIPHENHYDRAMINE HYDROCHLORIDE 50 MG/ML
12.5 INJECTION INTRAMUSCULAR; INTRAVENOUS ONCE AS NEEDED
Status: DISCONTINUED | OUTPATIENT
Start: 2024-12-19 | End: 2024-12-19 | Stop reason: HOSPADM

## 2024-12-19 RX ORDER — ONDANSETRON HYDROCHLORIDE 2 MG/ML
4 INJECTION, SOLUTION INTRAVENOUS ONCE AS NEEDED
Status: COMPLETED | OUTPATIENT
Start: 2024-12-19 | End: 2024-12-19

## 2024-12-19 RX ORDER — ONDANSETRON HYDROCHLORIDE 2 MG/ML
INJECTION, SOLUTION INTRAVENOUS AS NEEDED
Status: DISCONTINUED | OUTPATIENT
Start: 2024-12-19 | End: 2024-12-19

## 2024-12-19 RX ORDER — DEXMEDETOMIDINE IN 0.9 % NACL 20 MCG/5ML
SYRINGE (ML) INTRAVENOUS AS NEEDED
Status: DISCONTINUED | OUTPATIENT
Start: 2024-12-19 | End: 2024-12-19

## 2024-12-19 RX ORDER — DOCUSATE SODIUM 100 MG/1
100 CAPSULE, LIQUID FILLED ORAL 2 TIMES DAILY
Status: DISCONTINUED | OUTPATIENT
Start: 2024-12-19 | End: 2024-12-21 | Stop reason: HOSPADM

## 2024-12-19 RX ORDER — TIZANIDINE 4 MG/1
6 TABLET ORAL EVERY 6 HOURS PRN
Status: DISCONTINUED | OUTPATIENT
Start: 2024-12-19 | End: 2024-12-21 | Stop reason: HOSPADM

## 2024-12-19 RX ORDER — HYDROMORPHONE HYDROCHLORIDE 2 MG/ML
INJECTION, SOLUTION INTRAMUSCULAR; INTRAVENOUS; SUBCUTANEOUS AS NEEDED
Status: DISCONTINUED | OUTPATIENT
Start: 2024-12-19 | End: 2024-12-19

## 2024-12-19 SDOH — ECONOMIC STABILITY: FOOD INSECURITY: WITHIN THE PAST 12 MONTHS, YOU WORRIED THAT YOUR FOOD WOULD RUN OUT BEFORE YOU GOT THE MONEY TO BUY MORE.: NEVER TRUE

## 2024-12-19 SDOH — ECONOMIC STABILITY: INCOME INSECURITY: IN THE PAST 12 MONTHS HAS THE ELECTRIC, GAS, OIL, OR WATER COMPANY THREATENED TO SHUT OFF SERVICES IN YOUR HOME?: NO

## 2024-12-19 SDOH — SOCIAL STABILITY: SOCIAL INSECURITY: DO YOU FEEL UNSAFE GOING BACK TO THE PLACE WHERE YOU ARE LIVING?: NO

## 2024-12-19 SDOH — SOCIAL STABILITY: SOCIAL INSECURITY: HAS ANYONE EVER THREATENED TO HURT YOUR FAMILY OR YOUR PETS?: NO

## 2024-12-19 SDOH — SOCIAL STABILITY: SOCIAL INSECURITY: HAVE YOU HAD THOUGHTS OF HARMING ANYONE ELSE?: NO

## 2024-12-19 SDOH — SOCIAL STABILITY: SOCIAL INSECURITY: WITHIN THE LAST YEAR, HAVE YOU BEEN AFRAID OF YOUR PARTNER OR EX-PARTNER?: NO

## 2024-12-19 SDOH — HEALTH STABILITY: MENTAL HEALTH: CURRENT SMOKER: 1

## 2024-12-19 SDOH — SOCIAL STABILITY: SOCIAL INSECURITY: ABUSE: ADULT

## 2024-12-19 SDOH — SOCIAL STABILITY: SOCIAL INSECURITY: DOES ANYONE TRY TO KEEP YOU FROM HAVING/CONTACTING OTHER FRIENDS OR DOING THINGS OUTSIDE YOUR HOME?: NO

## 2024-12-19 SDOH — ECONOMIC STABILITY: FOOD INSECURITY: WITHIN THE PAST 12 MONTHS, THE FOOD YOU BOUGHT JUST DIDN'T LAST AND YOU DIDN'T HAVE MONEY TO GET MORE.: NEVER TRUE

## 2024-12-19 SDOH — SOCIAL STABILITY: SOCIAL INSECURITY
WITHIN THE LAST YEAR, HAVE YOU BEEN KICKED, HIT, SLAPPED, OR OTHERWISE PHYSICALLY HURT BY YOUR PARTNER OR EX-PARTNER?: NO

## 2024-12-19 SDOH — SOCIAL STABILITY: SOCIAL INSECURITY: ARE THERE ANY APPARENT SIGNS OF INJURIES/BEHAVIORS THAT COULD BE RELATED TO ABUSE/NEGLECT?: NO

## 2024-12-19 SDOH — SOCIAL STABILITY: SOCIAL INSECURITY
WITHIN THE LAST YEAR, HAVE YOU BEEN RAPED OR FORCED TO HAVE ANY KIND OF SEXUAL ACTIVITY BY YOUR PARTNER OR EX-PARTNER?: NO

## 2024-12-19 SDOH — SOCIAL STABILITY: SOCIAL INSECURITY: WITHIN THE LAST YEAR, HAVE YOU BEEN HUMILIATED OR EMOTIONALLY ABUSED IN OTHER WAYS BY YOUR PARTNER OR EX-PARTNER?: NO

## 2024-12-19 SDOH — SOCIAL STABILITY: SOCIAL INSECURITY: WERE YOU ABLE TO COMPLETE ALL THE BEHAVIORAL HEALTH SCREENINGS?: YES

## 2024-12-19 SDOH — SOCIAL STABILITY: SOCIAL INSECURITY: DO YOU FEEL ANYONE HAS EXPLOITED OR TAKEN ADVANTAGE OF YOU FINANCIALLY OR OF YOUR PERSONAL PROPERTY?: NO

## 2024-12-19 SDOH — SOCIAL STABILITY: SOCIAL INSECURITY: HAVE YOU HAD ANY THOUGHTS OF HARMING ANYONE ELSE?: NO

## 2024-12-19 SDOH — SOCIAL STABILITY: SOCIAL INSECURITY: ARE YOU OR HAVE YOU BEEN THREATENED OR ABUSED PHYSICALLY, EMOTIONALLY, OR SEXUALLY BY ANYONE?: NO

## 2024-12-19 ASSESSMENT — PAIN DESCRIPTION - DESCRIPTORS
DESCRIPTORS: ACHING

## 2024-12-19 ASSESSMENT — LIFESTYLE VARIABLES
HOW MANY STANDARD DRINKS CONTAINING ALCOHOL DO YOU HAVE ON A TYPICAL DAY: PATIENT DOES NOT DRINK
HOW OFTEN DO YOU HAVE A DRINK CONTAINING ALCOHOL: NEVER
HOW OFTEN DO YOU HAVE 6 OR MORE DRINKS ON ONE OCCASION: NEVER
AUDIT-C TOTAL SCORE: 0
AUDIT-C TOTAL SCORE: 0
SKIP TO QUESTIONS 9-10: 1

## 2024-12-19 ASSESSMENT — COGNITIVE AND FUNCTIONAL STATUS - GENERAL
MOBILITY SCORE: 22
MOVING TO AND FROM BED TO CHAIR: A LITTLE
DAILY ACTIVITIY SCORE: 24
MOVING TO AND FROM BED TO CHAIR: A LITTLE
CLIMB 3 TO 5 STEPS WITH RAILING: A LITTLE
PATIENT BASELINE BEDBOUND: NO
DAILY ACTIVITIY SCORE: 24
MOBILITY SCORE: 22
CLIMB 3 TO 5 STEPS WITH RAILING: A LITTLE

## 2024-12-19 ASSESSMENT — ACTIVITIES OF DAILY LIVING (ADL)
JUDGMENT_ADEQUATE_SAFELY_COMPLETE_DAILY_ACTIVITIES: YES
BATHING: INDEPENDENT
GROOMING: INDEPENDENT
LACK_OF_TRANSPORTATION: NO
TOILETING: INDEPENDENT
HEARING - RIGHT EAR: FUNCTIONAL
DRESSING YOURSELF: INDEPENDENT
HEARING - LEFT EAR: FUNCTIONAL
PATIENT'S MEMORY ADEQUATE TO SAFELY COMPLETE DAILY ACTIVITIES?: YES
WALKS IN HOME: INDEPENDENT
FEEDING YOURSELF: INDEPENDENT
ADEQUATE_TO_COMPLETE_ADL: YES

## 2024-12-19 ASSESSMENT — PAIN SCALES - GENERAL
PAINLEVEL_OUTOF10: 10 - WORST POSSIBLE PAIN
PAINLEVEL_OUTOF10: 4
PAINLEVEL_OUTOF10: 6
PAINLEVEL_OUTOF10: 4
PAINLEVEL_OUTOF10: 8
PAINLEVEL_OUTOF10: 5 - MODERATE PAIN
PAINLEVEL_OUTOF10: 6
PAINLEVEL_OUTOF10: 9
PAINLEVEL_OUTOF10: 9
PAINLEVEL_OUTOF10: 10 - WORST POSSIBLE PAIN

## 2024-12-19 ASSESSMENT — PATIENT HEALTH QUESTIONNAIRE - PHQ9
1. LITTLE INTEREST OR PLEASURE IN DOING THINGS: NOT AT ALL
2. FEELING DOWN, DEPRESSED OR HOPELESS: NOT AT ALL
SUM OF ALL RESPONSES TO PHQ9 QUESTIONS 1 & 2: 0

## 2024-12-19 ASSESSMENT — PAIN - FUNCTIONAL ASSESSMENT
PAIN_FUNCTIONAL_ASSESSMENT: 0-10

## 2024-12-19 ASSESSMENT — PAIN DESCRIPTION - LOCATION
LOCATION: NECK

## 2024-12-19 NOTE — ANESTHESIA POSTPROCEDURE EVALUATION
Patient: Cora Solares    Procedure Summary       Date: 12/19/24 Room / Location: [facility] OR 04 / [facility]    Anesthesia Start: 1152 Anesthesia Stop: 1405    Procedure: PARTIAL C3 AND C7 LAMINECTOMY AND C4-C6 CERVICAL LAMINOPLASTY (Left: Spine Cervical) Diagnosis:       Cervical spondylosis with myelopathy      (Cervical spondylosis with myelopathy [M47.12])    Surgeons: Irwin Verma MD Responsible Provider: Angel Baires MD    Anesthesia Type: general ASA Status: 3            Anesthesia Type: general    Vitals Value Taken Time   /66 12/19/24 1430   Temp 36 °C (96.8 °F) 12/19/24 1402   Pulse 62 12/19/24 1430   Resp 16 12/19/24 1430   SpO2 99 12/19/24 1440       Anesthesia Post Evaluation    Patient location during evaluation: PACU  Patient participation: complete - patient participated  Level of consciousness: awake  Pain management: adequate  Multimodal analgesia pain management approach  Airway patency: patent  Two or more strategies used to mitigate risk of obstructive sleep apnea  Cardiovascular status: acceptable  Respiratory status: acceptable  Hydration status: acceptable  Postoperative Nausea and Vomiting: none        No notable events documented.

## 2024-12-19 NOTE — ANESTHESIA PROCEDURE NOTES
Peripheral IV  Date/Time: 12/19/2024 12:32 PM      Placement  Needle size: 20 G  Laterality: left  Location: hand  Site prep: chlorhexidine  Attempts: 1

## 2024-12-19 NOTE — ANESTHESIA PROCEDURE NOTES
Airway  Date/Time: 12/19/2024 12:06 PM  Urgency: elective    Airway not difficult    Staffing  Performed: CRNA   Authorized by: Angel Baires MD    Performed by: ENA Zambrano-AJ  Patient location during procedure: OR    Indications and Patient Condition  Indications for airway management: anesthesia  Spontaneous Ventilation: absent  Sedation level: deep  Preoxygenated: yes  Patient position: sniffing  Mask difficulty assessment: 1 - vent by mask    Final Airway Details  Final airway type: endotracheal airway      Successful airway: ETT  Cuffed: yes   Successful intubation technique: video laryngoscopy  Facilitating devices/methods: intubating stylet  Blade: Criss  Blade size: #3  ETT size (mm): 7.0  Cormack-Lehane Classification: grade I - full view of glottis  Placement verified by: chest auscultation and capnometry   Measured from: lips  ETT to lips (cm): 21  Number of attempts at approach: 1

## 2024-12-19 NOTE — ANESTHESIA PREPROCEDURE EVALUATION
Patient: Cora Solares    Procedure Information       Date/Time: 12/19/24 1115    Procedure: PARTIAL C3 AND C7 LAMINECTOMY AND C4-C6 CERVICAL LAMINOPLASTY (Left: Spine Cervical)    Location: [facility] OR 04 / [facility]    Surgeons: Irwin Verma MD          Vitals:    12/19/24 1016   BP: 139/75   Pulse: 71   Resp: 18   Temp: 36 °C (96.8 °F)   SpO2: 99%       Past Surgical History:   Procedure Laterality Date    COLONOSCOPY      removed polyp    HYSTERECTOMY  05/19/2014    Hysterectomy    MANDIBLE SURGERY      2023 mass removed     Past Medical History:   Diagnosis Date    Acute laryngitis 03/08/2016    Acute laryngitis    Asthma     Cervical disc disease     Cutaneous abscess of chest wall 05/28/2014    Chest wall abscess    Cutaneous abscess of left lower limb 10/18/2016    Abscess of left thigh    Cutaneous abscess of right lower limb 08/14/2014    Abscess of right thigh    Depression     Headache 12/2021    Noninfective gastroenteritis and colitis, unspecified 10/21/2014    Acute gastroenteritis    Other fecal abnormalities 02/02/2016    Loose stools    Personal history of other diseases of the respiratory system 09/26/2016    History of acute sinusitis    Personal history of other diseases of the respiratory system 01/13/2015    History of influenza    Personal history of other infectious and parasitic diseases 11/30/2015    History of herpes labialis    Personal history of other specified conditions 04/11/2016    History of nausea    Personal history of other specified conditions 11/30/2015    History of dysuria    Personal history of urinary (tract) infections 12/31/2014    History of urinary tract infection    Spinal stenosis     Unspecified abdominal pain 02/26/2016    Abdominal cramping    Unspecified otitis externa, right ear 06/18/2014    Otitis externa of right ear       Current Facility-Administered Medications:     lactated Ringer's infusion, 20 mL/hr, intravenous, Continuous, Angel Baires MD,  Last Rate: 20 mL/hr at 12/19/24 1051, 20 mL/hr at 12/19/24 1051  Prior to Admission medications    Medication Sig Start Date End Date Taking? Authorizing Provider   albuterol 90 mcg/actuation aerosol powdr breath activated inhaler Inhale 2 puffs every 6 hours. 1/22/20  Yes Historical Provider, MD   budesonide-formoteroL (Symbicort) 160-4.5 mcg/actuation inhaler Inhale 2 puffs 2 times a day. 10/5/22  Yes Historical Provider, MD   ibuprofen 800 mg tablet Take 1 tablet (800 mg) by mouth every 8 hours if needed for mild pain (1 - 3).   Yes Historical Provider, MD   nortriptyline (Pamelor) 10 mg capsule Take 4 capsules (40 mg) by mouth once daily at bedtime. 11/6/24 12/19/24 Yes TRU Coronado APRN-CNS   tiZANidine (Zanaflex) 2 mg tablet Take 1 tablet (2 mg) by mouth 2 times a day as needed for muscle spasms. 8/20/24 12/19/24 Yes TRU Coronado APRN-CNS   cyclobenzaprine (Flexeril) 10 mg tablet Take 1 tablet (10 mg) by mouth 3 times a day as needed for muscle spasms. 12/19/24   TRU Angulo   docusate sodium (Colace) 100 mg capsule Take 1 capsule (100 mg) by mouth 2 times a day for 10 days. 12/19/24 12/29/24  TRU Angulo   oxyCODONE-acetaminophen (Percocet) 5-325 mg tablet Take 1 tablet by mouth every 4 hours if needed for severe pain (7 - 10) for up to 6 days. 12/19/24 12/25/24  TRU Angulo   polyethylene glycol (Glycolax, Miralax) 17 gram packet Take 17 g by mouth once daily for 10 days. 12/19/24 12/29/24  TRU Angulo     Allergies   Allergen Reactions    Magnesium Citrate Other     mouth sores    Penicillins Unknown    Sulfa (Sulfonamide Antibiotics) Unknown    Sulfamethoxazole-Trimethoprim Other     1PPD smoking        Chemistry    Lab Results   Component Value Date/Time     12/06/2024 1213    K 4.3 12/06/2024 1213     12/06/2024 1213    CO2 25 12/06/2024 1213    BUN 14 12/06/2024 1213    CREATININE 0.68 12/06/2024 1213    Lab  Results   Component Value Date/Time    CALCIUM 8.2 (L) 12/06/2024 1213    ALKPHOS 62 12/06/2024 1213    AST 18 12/06/2024 1213    ALT 16 12/06/2024 1213    BILITOT 0.4 12/06/2024 1213          Lab Results   Component Value Date/Time    WBC 6.7 12/06/2024 1213    HGB 11.8 (L) 12/06/2024 1213    HCT 35.6 (L) 12/06/2024 1213     12/06/2024 1213     Lab Results   Component Value Date/Time    PROTIME 10.3 02/08/2023 0831    INR 0.9 02/08/2023 0831     No results found for this or any previous visit (from the past 4464 hours).  No results found for this or any previous visit from the past 1095 days.      Relevant Problems   Pulmonary   (+) Asthma      Neuro   (+) Cervical radiculopathy   (+) Depression   (+) Occipital neuralgia of left side   (+) Opioid abuse, episodic (Multi)      Musculoskeletal   (+) Cervical spondylosis with myelopathy   (+) Cervical stenosis of spinal canal   (+) Chronic neck pain      ID   (+) Herpes labialis      GYN   (+) Endometriosis       Clinical information reviewed:    Allergies  Meds  Problems    OB Status           NPO Detail:  NPO/Void Status  Date of Last Liquid: 12/18/24  Date of Last Solid: 12/18/24         Physical Exam    Airway  Mallampati: II     Cardiovascular - normal exam     Dental    Pulmonary    Abdominal            Anesthesia Plan    History of general anesthesia?: yes  History of complications of general anesthesia?: no    ASA 3     general     The patient is a current smoker.  Patient was previously instructed to abstain from smoking on day of procedure.  Patient did not smoke on day of procedure.  Education provided regarding risk of obstructive sleep apnea.  intravenous induction   Anesthetic plan and risks discussed with patient.    Plan discussed with CRNA.

## 2024-12-19 NOTE — DISCHARGE INSTRUCTIONS
Spine DC instructions:  Don't push, pull, bend, or twist for 2 weeks after your surgery.     Don't sit for more than 20 to 30 minutes at a time.  Walk as much as you can. You can walk outside or inside. If you use a treadmill, walk at a slow speed, with no incline.    Going up and down stairs is also good for you, so do it as much as possible. Don't lift anything heavier than 10 pounds for 6 weeks.    Don't drive for 2 to 3 weeks after your surgery. And never drive if you are taking opioids or other pain medicines that can make you drowsy. Let others drive you instead.     Take your medicine exactly as directed by your healthcare provider.    Check your incision daily for redness, tenderness, or drainage.    Don't soak in a bathtub, hot tub, or pool until your healthcare provider says it's OK.    Follow your surgeon's instructions on when you can start showering. This is usually 24 to 48 hours after surgery. Then shower as needed. After showering, gently pat the incision dry. Don't rub it or apply creams or lotions.

## 2024-12-19 NOTE — OP NOTE
C4-C6 CERVICAL LAMINOPLASTY (L) Operative Note     Date: 2024  OR Location: [facility] OR    Name: Cora Solares, : 1980, Age: 44 y.o., MRN: [id number], Sex: female    Diagnosis  Pre-op Diagnosis      * Cervical spondylosis with myelopathy [M47.12] Post-op Diagnosis     * Cervical spondylosis with myelopathy [M47.12]     Procedures  PARTIAL C3 AND C7 LAMINECTOMY AND C4-C6 CERVICAL LAMINOPLASTY  65586 - MI LAMOP CERVICAL W/DCMPRN SPI CORD 2/> VERT SEG      Surgeons      * Irwin Ray - Primary    Resident/Fellow/Other Assistant:  Surgeons and Role:  * No surgeons found with a matching role *    Staff:   Circulator: Renae Guillory Person: Emiliana  Surgical Assistant: Sumeet    Anesthesia Staff: Anesthesiologist: Angel Baires MD  CRNA: ENA Zambrano-CRNA    Procedure Summary  Anesthesia: General  ASA: III  Estimated Blood Loss: 100 mL  Intra-op Medications:   Administrations occurring from 1115 to 1420 on 24:   Medication Name Total Dose   lidocaine-epinephrine (Xylocaine W/EPI) 1 %-1:100,000 injection 10 mL   bupivacaine PF 0.5 % (Marcaine) 0.5 % (5 mg/mL) injection 10 mL   gelatin sponge,absorb-porcine (Gelfoam) sponge 1 each   bacitracin ointment 2 Application   ceFAZolin (Ancef) vial 1 g 1 g   dexAMETHasone (Decadron) 4 mg/mL 8 mg   dexmedeTOMidine 4 mcg/mL in NS 5 mL syringe 20 mcg   esmolol (Brevibloc) 10 mcg/mL  IV bolus 100 mg   HYDROmorphone (Dilaudid) 2 mg/mL vial 1 mg   ketamine injection 50 mg/ 5 mL (10 mg/mL) 50 mg   lidocaine (Xylocaine) injection 2 % 50 mg   lidocaine (LTA Kit) for intubation 4 mL   ondansetron 2 mg/mL 4 mg   propofol (Diprivan) infusion 10 mg/mL 319.25 mg   rocuronium (ZeMuron) 50 mg/5 mL injection 70 mg   sugammadex (Bridion) 200 mg/2 mL injection 200 mg              Anesthesia Record               Intraprocedure I/O Totals          Intake    Propofol Drip 0.00 mL    The total shown is the total volume documented since Anesthesia Start was filed.    Total  Intake 0 mL          Specimen: No specimens collected              Drains and/or Catheters:   Closed/Suction Drain 1 Left;Dorsal Neck Bulb 10 Fr. (Active)   Site Description Healing 12/19/24 1338   Dressing Status Clean;Dry 12/19/24 1338   Drainage Appearance Bright red 12/19/24 1338   Status To bulb suction 12/19/24 1338       Tourniquet Times:         Implants:  Implants       Type Name Action Serial No.      Screw SCREW, LEVERAGE, LATERAL MASS, 2.6 X 7MM - [id number] Implanted      Screw SCREW, LEVERAGE, LATERAL MASS, 2.6 X 7MM - [id number] Implanted      Screw SCREW, LEVERAGE, LATERAL MASS, 2.6 X 7MM - [id number] Implanted      Screw SCREW, LEVERAGE, LATERAL MASS, 2.6 X 7MM - [id number] Implanted      Screw SCREW, LEVERAGE, LATERAL MASS, 2.6 X 7MM - [id number] Implanted      Screw SCREW, LEVERAGE, LATERAL MASS, 2.6 X 7MM - [id number] Implanted      Screw SCREW, LEVERAGE 2.6 X 5MM, LAMINER - [id number] Implanted      Screw PLATE, LEVERAGE, 8MM GRAFT C-C - [id number] Implanted               Findings: see procedure details    Indications: Cora Solares is an 44 y.o. female who is having surgery for Cervical spondylosis with myelopathy [M47.12].     The patient was seen in the preoperative area. The risks, benefits, complications, treatment options, non-operative alternatives, expected recovery and outcomes were discussed with the patient. The possibilities of reaction to medication, pulmonary aspiration, injury to surrounding structures, bleeding, recurrent infection, the need for additional procedures, failure to diagnose a condition, and creating a complication requiring transfusion or operation were discussed with the patient. The patient concurred with the proposed plan, giving informed consent.  The site of surgery was properly noted/marked if necessary per policy. The patient has been actively warmed in preoperative area. Preoperative antibiotics have been ordered and given within 1 hours of incision.  Venous thrombosis prophylaxis have been ordered including bilateral sequential compression devices    Procedure Details:   After informed consent was obtained, the patient was brought back to the operating room and general anesthesia was induced by the anesthesia team.  The patient was put in [place] head fixation and flipped prone onto a regular table with gel rolls.  The head and neck was fixed to the bed in relatively neutral position with a slight amount of  tach.  A midline incision was planned and marked from C2-C7 and this area was clipped, prepped, draped in the usual sterile fashion.  Incision was made with 10 blade.  Subdermal dissection was done with monopolar cautery.  A subperiosteal dissection was done over the spinous processes, lamina, and lateral masses from C3-C7 bilaterally.  These levels were confirmed using intraoperative fluoroscopy.  Troughs were drilled bilaterally with a high-speed matchstick drill at the junction of the lamina and lateral masses bilaterally at C4, C5, and C6.  A Leksell rongeur was used to bite off the interspinous ligament and the inferior portion of C3 at the C3-4 interspace as well as the C6-C7 interspace.  The [employer] leverage laminoplasty system was used.  5 mm laminar screws were used on the left side at C4, C5, and C6.  The trough was completed on the left side at C4, C5, and C6 at the laminar lateral mass junction and the ligament was removed with curettes and Kerrison rongeurs in this location and also at the C6-7 and C3-4 interspace.  The lamina was lifted up on the left side with a hinge on the right side.  Small, craniocaudal laminoplasty plates were used to secure the lamina to the lateral mass on the left side at C4, C5, and C6 with 7 mm lateral mass screws.  X-ray showed the hardware to be in good position.  Hemostasis was achieved with Floseal, bipolar cautery, and bone wax.  The wound was copiously irrigated antibiotic saline.  A 10 round  subfascial drain was placed and secured to skin with 2-0 Prolene suture.  The wound was then closed in layers with interrupted 0 Vicryl and 2-0 Vicryl sutures.  The skin was closed with a running 5-0 Monocryl stitch.  The wound was washed, dried, and dressed with bacitracin and Telfa dressing.  The patient was then taken out of [place] head fixation, flipped supine onto her bed, and handed over the anesthesia team.  She was extubated and taken to the recovery area in stable condition.  There were no neuromonitoring SSEP changes throughout the case.    Complications:  None; patient tolerated the procedure well.    Disposition: PACU - hemodynamically stable.  Condition: stable       Attending Attestation: I performed the procedure.    Irwin Verma  Phone Number: [phone]

## 2024-12-20 ENCOUNTER — PHARMACY VISIT (OUTPATIENT)
Dept: PHARMACY | Facility: CLINIC | Age: 44
End: 2024-12-20
Payer: COMMERCIAL

## 2024-12-20 LAB
ANION GAP SERPL CALC-SCNC: 10 MMOL/L (ref 10–20)
BUN SERPL-MCNC: 12 MG/DL (ref 6–23)
CALCIUM SERPL-MCNC: 8 MG/DL (ref 8.6–10.3)
CHLORIDE SERPL-SCNC: 103 MMOL/L (ref 98–107)
CO2 SERPL-SCNC: 26 MMOL/L (ref 21–32)
CREAT SERPL-MCNC: 0.56 MG/DL (ref 0.5–1.05)
EGFRCR SERPLBLD CKD-EPI 2021: >90 ML/MIN/1.73M*2
ERYTHROCYTE [DISTWIDTH] IN BLOOD BY AUTOMATED COUNT: 13.3 % (ref 11.5–14.5)
GLUCOSE SERPL-MCNC: 149 MG/DL (ref 74–99)
HCT VFR BLD AUTO: 32.7 % (ref 36–46)
HGB BLD-MCNC: 10.4 G/DL (ref 12–16)
MCH RBC QN AUTO: 31.6 PG (ref 26–34)
MCHC RBC AUTO-ENTMCNC: 31.8 G/DL (ref 32–36)
MCV RBC AUTO: 99 FL (ref 80–100)
NRBC BLD-RTO: 0 /100 WBCS (ref 0–0)
PLATELET # BLD AUTO: 311 X10*3/UL (ref 150–450)
POTASSIUM SERPL-SCNC: 4.1 MMOL/L (ref 3.5–5.3)
RBC # BLD AUTO: 3.29 X10*6/UL (ref 4–5.2)
SODIUM SERPL-SCNC: 135 MMOL/L (ref 136–145)
WBC # BLD AUTO: 13.6 X10*3/UL (ref 4.4–11.3)

## 2024-12-20 PROCEDURE — 2500000004 HC RX 250 GENERAL PHARMACY W/ HCPCS (ALT 636 FOR OP/ED): Performed by: NURSE PRACTITIONER

## 2024-12-20 PROCEDURE — 97530 THERAPEUTIC ACTIVITIES: CPT | Mod: GP

## 2024-12-20 PROCEDURE — 80048 BASIC METABOLIC PNL TOTAL CA: CPT | Performed by: NURSE PRACTITIONER

## 2024-12-20 PROCEDURE — 97161 PT EVAL LOW COMPLEX 20 MIN: CPT | Mod: GP

## 2024-12-20 PROCEDURE — 85027 COMPLETE CBC AUTOMATED: CPT | Performed by: NURSE PRACTITIONER

## 2024-12-20 PROCEDURE — 36415 COLL VENOUS BLD VENIPUNCTURE: CPT | Performed by: NURSE PRACTITIONER

## 2024-12-20 PROCEDURE — 94760 N-INVAS EAR/PLS OXIMETRY 1: CPT

## 2024-12-20 PROCEDURE — 2500000001 HC RX 250 WO HCPCS SELF ADMINISTERED DRUGS (ALT 637 FOR MEDICARE OP): Performed by: NURSE PRACTITIONER

## 2024-12-20 PROCEDURE — 2500000002 HC RX 250 W HCPCS SELF ADMINISTERED DRUGS (ALT 637 FOR MEDICARE OP, ALT 636 FOR OP/ED): Performed by: STUDENT IN AN ORGANIZED HEALTH CARE EDUCATION/TRAINING PROGRAM

## 2024-12-20 PROCEDURE — 94640 AIRWAY INHALATION TREATMENT: CPT

## 2024-12-20 PROCEDURE — 2500000002 HC RX 250 W HCPCS SELF ADMINISTERED DRUGS (ALT 637 FOR MEDICARE OP, ALT 636 FOR OP/ED): Performed by: NURSE PRACTITIONER

## 2024-12-20 PROCEDURE — 7100000011 HC EXTENDED STAY RECOVERY HOURLY - NURSING UNIT

## 2024-12-20 PROCEDURE — RXMED WILLOW AMBULATORY MEDICATION CHARGE

## 2024-12-20 PROCEDURE — 99221 1ST HOSP IP/OBS SF/LOW 40: CPT | Performed by: INTERNAL MEDICINE

## 2024-12-20 RX ORDER — IPRATROPIUM BROMIDE AND ALBUTEROL SULFATE 2.5; .5 MG/3ML; MG/3ML
3 SOLUTION RESPIRATORY (INHALATION) EVERY 4 HOURS PRN
Status: DISCONTINUED | OUTPATIENT
Start: 2024-12-20 | End: 2024-12-20

## 2024-12-20 RX ORDER — IPRATROPIUM BROMIDE AND ALBUTEROL SULFATE 2.5; .5 MG/3ML; MG/3ML
3 SOLUTION RESPIRATORY (INHALATION) EVERY 2 HOUR PRN
Status: DISCONTINUED | OUTPATIENT
Start: 2024-12-20 | End: 2024-12-21 | Stop reason: HOSPADM

## 2024-12-20 RX ORDER — TIZANIDINE 2 MG/1
2 TABLET ORAL EVERY 8 HOURS PRN
Qty: 42 TABLET | Refills: 0 | Status: SHIPPED | OUTPATIENT
Start: 2024-12-20 | End: 2025-01-03

## 2024-12-20 ASSESSMENT — PAIN SCALES - GENERAL
PAINLEVEL_OUTOF10: 9
PAINLEVEL_OUTOF10: 6
PAINLEVEL_OUTOF10: 8
PAINLEVEL_OUTOF10: 8
PAINLEVEL_OUTOF10: 7
PAINLEVEL_OUTOF10: 8
PAINLEVEL_OUTOF10: 8
PAINLEVEL_OUTOF10: 10 - WORST POSSIBLE PAIN
PAINLEVEL_OUTOF10: 8

## 2024-12-20 ASSESSMENT — COGNITIVE AND FUNCTIONAL STATUS - GENERAL
WALKING IN HOSPITAL ROOM: A LOT
MOVING TO AND FROM BED TO CHAIR: A LITTLE
CLIMB 3 TO 5 STEPS WITH RAILING: A LITTLE
MOVING TO AND FROM BED TO CHAIR: A LITTLE
STANDING UP FROM CHAIR USING ARMS: A LITTLE
MOVING TO AND FROM BED TO CHAIR: A LITTLE
CLIMB 3 TO 5 STEPS WITH RAILING: A LOT
MOBILITY SCORE: 18
MOBILITY SCORE: 22
MOBILITY SCORE: 22
CLIMB 3 TO 5 STEPS WITH RAILING: A LITTLE
DAILY ACTIVITIY SCORE: 24
DAILY ACTIVITIY SCORE: 24

## 2024-12-20 ASSESSMENT — PAIN DESCRIPTION - LOCATION
LOCATION: NECK

## 2024-12-20 ASSESSMENT — ACTIVITIES OF DAILY LIVING (ADL)
LACK_OF_TRANSPORTATION: NO
ADLS_ADDRESSED: YES
ADL_ASSISTANCE: INDEPENDENT

## 2024-12-20 ASSESSMENT — PAIN - FUNCTIONAL ASSESSMENT
PAIN_FUNCTIONAL_ASSESSMENT: 0-10

## 2024-12-20 ASSESSMENT — PAIN DESCRIPTION - DESCRIPTORS: DESCRIPTORS: ACHING

## 2024-12-20 NOTE — CONSULTS
Inpatient consult to Medicine  Consult performed by: Nayely Daniel DO  Consult ordered by: Tanisha Todd, APRN-CNP      [facility] Hospitalist History and Physical Note         Cora Solares  :  1980(44 y.o.)  MRN:  [id number]  PCP: Enoc Mott MD    Assessment & Plan  Cervical spondylosis with myelopathy    Cervical stenosis of spinal canal      Assessment and Plan:     Cora Solares is a 44 y.o. female with PMH asthma and cervical spondylosis with myelopathy admitted for elective C3 and C7 laminectomy, C4 C6 cervical laminoplasty on 2024. Medicine was consulted for medical management. Patient's only complaint is post op pain controlled with analgesics.    #Asthma w/o exacerbation on bronchodilators  #Post- op pain on analgesics  #cervical spondylosis with myelopathy s/p elective C3 and C7 laminectomy, C4 C6 cervical laminoplasty on 2024    Disposition: Will s/o please text via Haiku for questions or concerns    Code status: Full Code  Diet: Adult diet Regular    BMI Classification: Body mass index is 21.35 kg/m². Normal BMI 18.5-24.9    Level of MDM:  Moderate    I personally examined the patient and I personally reviewed chart, data, labs radiology reports      Total time spent: 40 minutes, of total time providing counseling or in coordination of care.  Total time on this day of visit includes record and documentation review before and after visit including documentation and time not explicitly included on EMR time stamp.    [id number]: Please page me for patient care issues.  [id number]: Please page night hospitalist for any issues.        Subjective:     Chief complaint: medical management  Interval History:  Cora Solares is a 44 y.o. female with PMH asthma and cervical spondylosis with myelopathy admitted for elective C3 and C7 laminectomy, C4 C6 cervical laminoplasty on 2024. Medicine was consulted for medical management. Patient's only complaint is post op pain  controlled with analgesics.    Temp:  [36 °C (96.8 °F)-37.2 °C (99 °F)] 37.2 °C (99 °F)  Heart Rate:  [54-77] 71  Resp:  [14-18] 18  BP: (103-135)/(64-83) 127/81  Lab Results   Component Value Date    GLUCOSE 149 (H) 12/20/2024    CALCIUM 8.0 (L) 12/20/2024     (L) 12/20/2024    K 4.1 12/20/2024    CO2 26 12/20/2024     12/20/2024    BUN 12 12/20/2024    CREATININE 0.56 12/20/2024     Lab Results   Component Value Date    WBC 13.6 (H) 12/20/2024    HGB 10.4 (L) 12/20/2024    HCT 32.7 (L) 12/20/2024    MCV 99 12/20/2024     12/20/2024     IMAGES:  No results found for this or any previous visit (from the past 4464 hours).     No echocardiogram results found for the past 12 months  === 08/26/24 ===    XR CERVICAL SPINE 2-3 VIEWS    - Impression -  1.  No evidence of acute fracture.  2. Degenerative changes, as above.    MACRO:  None    Signed by: Abdias Faust 8/30/2024 1:19 PM  Dictation workstation:   [id number]  === 01/23/23 ===    CT SOFT TISSUE NECK W IV CONTRAST    - Impression -  There is a 1.5 cm soft tissue lesion located within the left  paramedian submental subcutaneous fat and skin which is probably a  sequela of a previously drained sebaceous cyst since a sebaceous cyst  was seen in this region on the CT cervical spine study from  01/12/2020. Enhancement within this region may reflect an infectious  process. Correlate with direct visualization. There is no imaging  evidence of a thyroglossal duct remanent/cyst.    This study was interpreted at [facility].  === 08/26/24 ===    XR CERVICAL SPINE 2-3 VIEWS    - Impression -  1.  No evidence of acute fracture.  2. Degenerative changes, as above.    MACRO:  None    Signed by: Abdias Faust 8/30/2024 1:19 PM  Dictation workstation:   [id number]  === 10/26/24 ===    MR CERVICAL SPINE WO CONTRAST    - Impression -  Multilevel degenerative disc disease and facet arthrosis with  moderate spinal canal stenosis  at C4-C5 and C5-C6. Mild neural  foraminal narrowing as detailed above.    MACRO:  None    Signed by: Roxy Fernandez 10/27/2024 5:59 PM  Dictation workstation:   [id number]    Past Medical History:   Diagnosis Date    Acute laryngitis 03/08/2016    Acute laryngitis    Asthma     Cervical disc disease     Cutaneous abscess of chest wall 05/28/2014    Chest wall abscess    Cutaneous abscess of left lower limb 10/18/2016    Abscess of left thigh    Cutaneous abscess of right lower limb 08/14/2014    Abscess of right thigh    Depression     Headache 12/2021    Noninfective gastroenteritis and colitis, unspecified 10/21/2014    Acute gastroenteritis    Other fecal abnormalities 02/02/2016    Loose stools    Personal history of other diseases of the respiratory system 09/26/2016    History of acute sinusitis    Personal history of other diseases of the respiratory system 01/13/2015    History of influenza    Personal history of other infectious and parasitic diseases 11/30/2015    History of herpes labialis    Personal history of other specified conditions 04/11/2016    History of nausea    Personal history of other specified conditions 11/30/2015    History of dysuria    Personal history of urinary (tract) infections 12/31/2014    History of urinary tract infection    Spinal stenosis     Unspecified abdominal pain 02/26/2016    Abdominal cramping    Unspecified otitis externa, right ear 06/18/2014    Otitis externa of right ear     Past Surgical History:   Procedure Laterality Date    COLONOSCOPY      removed polyp    HYSTERECTOMY  05/19/2014    Hysterectomy    MANDIBLE SURGERY      2023 mass removed     Family History   Problem Relation Name Age of Onset    Hypertension Mother Mom     Diabetes Mother Mom     Hypertension Father Dad     Breast cancer Father's Sister      Breast cancer Father's Sister      Breast cancer Father's Sister      Breast cancer Father's Sister      Breast cancer Father's Sister       Social History      Socioeconomic History    Marital status:      Spouse name: Not on file    Number of children: Not on file    Years of education: Not on file    Highest education level: Not on file   Occupational History    Not on file   Tobacco Use    Smoking status: Every Day     Current packs/day: 0.50     Average packs/day: 0.5 packs/day for 29.0 years (15.0 ttl pk-yrs)     Types: Cigarettes    Smokeless tobacco: Never   Vaping Use    Vaping status: Never Used   Substance and Sexual Activity    Alcohol use: Not Currently    Drug use: Not Currently     Types: Marijuana     Comment: med marijuana last used 5/22    Sexual activity: Not Currently   Other Topics Concern    Not on file   Social History Narrative    Not on file     Social Drivers of Health     Financial Resource Strain: Low Risk  (12/20/2024)    Overall Financial Resource Strain (CARDIA)     Difficulty of Paying Living Expenses: Not hard at all   Food Insecurity: No Food Insecurity (12/19/2024)    Hunger Vital Sign     Worried About Running Out of Food in the Last Year: Never true     Ran Out of Food in the Last Year: Never true   Transportation Needs: No Transportation Needs (12/20/2024)    PRAPARE - Transportation     Lack of Transportation (Medical): No     Lack of Transportation (Non-Medical): No   Physical Activity: Not on file   Stress: Not on file   Social Connections: Not on file   Intimate Partner Violence: Not At Risk (12/19/2024)    Humiliation, Afraid, Rape, and Kick questionnaire     Fear of Current or Ex-Partner: No     Emotionally Abused: No     Physically Abused: No     Sexually Abused: No   Housing Stability: Low Risk  (12/20/2024)    Housing Stability Vital Sign     Unable to Pay for Housing in the Last Year: No     Number of Times Moved in the Last Year: 0     Homeless in the Last Year: No       Allergies   Allergen Reactions    Magnesium Citrate Other     mouth sores    Penicillins Unknown    Sulfa (Sulfonamide Antibiotics) Unknown     Sulfamethoxazole-Trimethoprim Other     Prior to Admission medications    Medication Sig Start Date End Date Taking? Authorizing Provider   albuterol 90 mcg/actuation aerosol powdr breath activated inhaler Inhale 2 puffs every 6 hours. 1/22/20  Yes Historical Provider, MD   budesonide-formoteroL (Symbicort) 160-4.5 mcg/actuation inhaler Inhale 2 puffs 2 times a day. 10/5/22  Yes Historical Provider, MD   ibuprofen 800 mg tablet Take 1 tablet (800 mg) by mouth every 8 hours if needed for mild pain (1 - 3).   Yes Historical Provider, MD   nortriptyline (Pamelor) 10 mg capsule Take 4 capsules (40 mg) by mouth once daily at bedtime. 11/6/24 12/19/24 Yes TRU Coronado APRN-CNS   tiZANidine (Zanaflex) 2 mg tablet Take 1 tablet (2 mg) by mouth 2 times a day as needed for muscle spasms. 8/20/24 12/20/24 Yes TRU Coronado APRN-CNS   docusate sodium (Colace) 100 mg capsule Take 1 capsule (100 mg) by mouth 2 times a day for 10 days. 12/19/24 12/29/24  TRU Angulo   oxyCODONE-acetaminophen (Percocet) 5-325 mg tablet Take 1 tablet by mouth every 4 hours if needed for severe pain (7 - 10) for up to 6 days. 12/19/24 12/25/24  TRU Angulo   polyethylene glycol (Glycolax, Miralax) 17 gram/dose powder Mix 17 g of powder and drink once daily for 10 days 12/19/24   TRU Angulo   tiZANidine (Zanaflex) 2 mg tablet Take 1 tablet (2 mg) by mouth every 8 hours if needed for muscle spasms for up to 14 days. 12/20/24 1/3/25  TRU Angulo   cyclobenzaprine (Flexeril) 10 mg tablet Take 1 tablet (10 mg) by mouth 3 times a day as needed for muscle spasms. 12/19/24 12/20/24  TRU Angulo       Review of Systems   All other systems reviewed and are negative.      Objective:     Vitals:    12/20/24 0021 12/20/24 0428 12/20/24 0913 12/20/24 0958   BP: 117/73 103/66  127/81   BP Location: Left arm Right arm  Right arm   Patient Position: Lying Lying  Lying    Pulse: 59 61  71   Resp: 16 16  18   Temp: 36.6 °C (97.9 °F) 36 °C (96.8 °F)  37.2 °C (99 °F)   TempSrc: Temporal Temporal  Temporal   SpO2: 99% 100% 99% 100%   Weight:       Height:         Physical Exam  Vitals and nursing note reviewed.   Constitutional:       Appearance: Normal appearance.      Interventions: Cervical collar in place.      Comments: Posterior neck STEPHAN drain intact with bloody discharge   HENT:      Head: Normocephalic and atraumatic.      Right Ear: External ear normal.      Left Ear: External ear normal.      Nose: Nose normal.      Mouth/Throat:      Mouth: Mucous membranes are moist.   Eyes:      General: No scleral icterus.        Right eye: No discharge.         Left eye: No discharge.      Extraocular Movements: Extraocular movements intact.      Conjunctiva/sclera: Conjunctivae normal.      Pupils: Pupils are equal, round, and reactive to light.   Cardiovascular:      Rate and Rhythm: Normal rate and regular rhythm.   Pulmonary:      Effort: Pulmonary effort is normal.      Breath sounds: Normal breath sounds.   Abdominal:      General: Abdomen is flat. Bowel sounds are normal.      Palpations: Abdomen is soft.   Musculoskeletal:         General: Normal range of motion.      Right lower leg: No edema.      Left lower leg: No edema.   Skin:     General: Skin is warm and dry.      Capillary Refill: Capillary refill takes less than 2 seconds.   Neurological:      General: No focal deficit present.   Psychiatric:         Mood and Affect: Mood normal.         Thought Content: Thought content normal.         Judgment: Judgment normal.         Lab Results   Component Value Date     (L) 12/20/2024    K 4.1 12/20/2024     12/20/2024    CO2 26 12/20/2024    BUN 12 12/20/2024    CREATININE 0.56 12/20/2024    GLUCOSE 149 (H) 12/20/2024    CALCIUM 8.0 (L) 12/20/2024    PROT 6.2 (L) 12/06/2024    BILITOT 0.4 12/06/2024    ALKPHOS 62 12/06/2024    AST 18 12/06/2024    ALT 16 12/06/2024      Lab Results   Component Value Date    WBC 13.6 (H) 12/20/2024    HGB 10.4 (L) 12/20/2024    HCT 32.7 (L) 12/20/2024    MCV 99 12/20/2024     12/20/2024     Lab Results   Component Value Date    TSH 1.40 09/20/2024     Lab Results   Component Value Date    LACTATE 0.6 07/13/2022    INR 0.9 02/08/2023     Additional results since admission have been reviewed.    Dictated using PagerDuty Version 2.4  Proof read however unrecognized voice recognition errors may have occurred     Electronically signed by Nayely Daniel DO on 12/20/24 at 11:57 AM

## 2024-12-20 NOTE — CARE PLAN
The patient's goals for the shift include      The clinical goals for the shift include Patient will be free from pain    Problem: Pain - Adult  Goal: Verbalizes/displays adequate comfort level or baseline comfort level  Outcome: Progressing     Problem: Safety - Adult  Goal: Free from fall injury  Outcome: Progressing     Problem: Discharge Planning  Goal: Discharge to home or other facility with appropriate resources  Outcome: Progressing

## 2024-12-20 NOTE — NURSING NOTE
1900-Assumed patient care.    2300-I sent a secure chat to the hospitalist Tony Mackay that the pt was requesting Tizanidine. He ordered the medication.

## 2024-12-20 NOTE — PROGRESS NOTES
12/20/24 1137   Discharge Planning   Living Arrangements Spouse/significant other   Support Systems Family members;Friends/neighbors;Spouse/significant other   Assistance Needed Patient is from home with her significant other, is A&O X3, on room air at baseline, is independent with ADLs and uses no DME at home. Patient does not drive- family transports to appointments. Patient denies further needs upon discharge.   Type of Residence Private residence   Number of Stairs to Enter Residence 15   Number of Stairs Within Residence 0   Do you have animals or pets at home? Yes   Type of Animals or Pets 1 dog 2 cats   Who is requesting discharge planning? Provider   Home or Post Acute Services None   Expected Discharge Disposition Home   Does the patient need discharge transport arranged? No   Financial Resource Strain   How hard is it for you to pay for the very basics like food, housing, medical care, and heating? Not hard   Housing Stability   In the last 12 months, was there a time when you were not able to pay the mortgage or rent on time? N   At any time in the past 12 months, were you homeless or living in a shelter (including now)? N   Transportation Needs   In the past 12 months, has lack of transportation kept you from medical appointments or from getting medications? no   In the past 12 months, has lack of transportation kept you from meetings, work, or from getting things needed for daily living? No   Stroke Family Assessment   Stroke Family Assessment Needed No   Intensity of Service   Intensity of Service 0-30 min

## 2024-12-20 NOTE — SIGNIFICANT EVENT
Patient educated on ordered bronchodilators and SVN for administration. Patient's questions asked and answered appropriately.

## 2024-12-20 NOTE — PROGRESS NOTES
Physical Therapy    Physical Therapy Evaluation & Treatment    Patient Name: Cora Solares  MRN: [id number]  Department: [address]  Room: [address] [place] [address]  Today's Date: 12/20/2024   Time Calculation  AM: 820-834  AM: 7540-1931  Time Calculation (min): 44 min    Assessment/Plan   PT Assessment  PT Assessment Results: Decreased strength, Decreased endurance, Impaired balance, Decreased mobility  Rehab Prognosis: Excellent  Barriers to Discharge Home: No anticipated barriers  Evaluation/Treatment Tolerance: Patient limited by pain  Medical Staff Made Aware: Yes  Strengths: Ability to acquire knowledge, Insight into problems, Support of Caregivers  Barriers to Participation: Housing layout (walk up apt)  End of Session Communication: Bedside nurse, PCT/NA/CTA  Assessment Comment: Patient pleasant, cooperative, high levels of pain being managed by medical team. Patient anticipated to remain overnight d/t output of STEPHAN drain. Patient would continue to benefit from low intensity PT intervention to address activity tolerance, stair training and gait.  End of Session Patient Position: Up in chair, Alarm on (all needs within reach)      PT Plan  Treatment/Interventions: Transfer training, Gait training, Stair training, Balance training, Endurance training  PT Plan: Ongoing PT  PT Frequency: 2 times per week  PT Discharge Recommendations: Low intensity level of continued care  PT Recommended Transfer Status: Assist x1  PT - OK to Discharge: Yes (per PT POC)      Subjective     General Visit Information:  General  Reason for Referral: Impaired functional mobility; C4-6 lami  Referred By: Irwin Verma  Past Medical History Relevant to Rehab: Spinal stenosis, asthmas, depression  Family/Caregiver Present: No  Prior to Session Communication: Bedside nurse  Patient Position Received: Bed, 3 rail up, Alarm on  General Comment: Patient pleasant, cooperative and agreeable to PT eval  Home Living:  Home Living  Type of Home: Apartment  Lives  With: Significant other (Boyfriend)  Home Adaptive Equipment: None  Home Layout: One level  Home Access: Stairs to enter with rails  Entrance Stairs-Rails: Right  Entrance Stairs-Number of Steps: 15  Bathroom Shower/Tub: Tub/shower unit, Walk-in shower  Prior Level of Function:  Prior Function Per Pt/Caregiver Report  Level of [place]: Independent with ADLs and functional transfers, Independent with homemaking with ambulation  Receives Help From: Family  ADL Assistance: Independent  Homemaking Assistance: Independent  Ambulatory Assistance: Independent  Precautions:  Precautions  Medical Precautions: Fall precautions (STEPHAN drain, soft cervical collar for comfort (confirmed with NP and MD))    Vital Signs (Past 2hrs)        Date/Time Vitals Session Patient Position Pulse Resp SpO2 BP MAP (mmHg)    12/20/24 1314 --  --  76  18  97 %  110/72  --                        Objective   Pain:  Pain Assessment  Pain Assessment: 0-10  0-10 (Numeric) Pain Score: 8  Pain Type: Surgical pain  Pain Location: Neck  Pain Interventions: Repositioned, Ambulation/increased activity, Cold applied (RN aware)  Cognition:  Cognition  Overall Cognitive Status: Within Functional Limits  Orientation Level: Oriented X4    General Assessments:  General Observation  General Observation: Patient educated on use of soft cervical collar, prn for comfort     Activity Tolerance  Endurance: Tolerates 10 - 20 min exercise with multiple rests    Sensation  Light Touch: No apparent deficits    Strength  Strength Comments: B LE 4+/5    Coordination  Movements are Fluid and Coordinated: Yes    Postural Control  Postural Control: Within Functional Limits    Static Sitting Balance  Static Sitting-Balance Support: Bilateral upper extremity supported, Feet supported  Static Sitting-Level of Assistance: Independent  Dynamic Sitting Balance  Dynamic Sitting-Balance Support: Feet supported  Dynamic Sitting-Level of Assistance: Independent  Dynamic  Sitting-Balance: Forward lean    Static Standing Balance  Static Standing-Balance Support: Left upper extremity supported  Static Standing-Level of Assistance: Contact guard  Functional Assessments:  ADL  ADL's Addressed: Yes (Independent for donning socks, figure 4 position. Restroom use with BSC: CGA for transfers, min A lower dressing, independent for personal hygiene)    Bed Mobility  Bed Mobility: Yes  Bed Mobility 1  Bed Mobility 1: Supine to sitting  Level of Assistance 1: Contact guard    Transfers  Transfer: Yes  Transfer 1  Transfer From 1: Sit to, Stand to  Transfer to 1: Sit, Stand  Technique 1: Sit to stand, Stand to sit  Transfer Level of Assistance 1: Contact guard    Ambulation/Gait Training  Ambulation/Gait Training Performed: Yes  Ambulation/Gait Training 1  Surface 1: Level tile  Device 1: No device  Assistance 1: Contact guard  Quality of Gait 1:  (guarded posture, decreaesd tolerance d/t pain)  Comments/Distance (ft) 1: 3' x 2    Treatments:   Patient educated on cervical precautions: avoid bending/lifting/twisting. Soft collar for comfort only. Importance of changing positions throughout the day with staff assist, up in chair for all meals and progress ambulation as tolerated. Currently using BSC but can progress to restroom as able. Discussed with RN.     Bed Mobility  Bed Mobility: Yes  Bed Mobility 1  Bed Mobility 1: Supine to sitting  Level of Assistance 1: Contact guard    Ambulation/Gait Training  Ambulation/Gait Training Performed: Yes  Ambulation/Gait Training 1  Surface 1: Level tile  Device 1: No device  Assistance 1: Contact guard  Quality of Gait 1:  (guarded posture, decreaesd tolerance d/t pain)  Comments/Distance (ft) 1: 3' x 2  Transfers  Transfer: Yes  Transfer 1  Transfer From 1: Sit to, Stand to  Transfer to 1: Sit, Stand  Technique 1: Sit to stand, Stand to sit  Transfer Level of Assistance 1: Contact guard       Outcome Measures:  [facility] Basic Mobility  Turning from your  back to your side while in a flat bed without using bedrails: None  Moving from lying on your back to sitting on the side of a flat bed without using bedrails: None  Moving to and from bed to chair (including a wheelchair): A little  Standing up from a chair using your arms (e.g. wheelchair or bedside chair): A little  To walk in hospital room: A lot  Climbing 3-5 steps with railing: A lot  Basic Mobility - Total Score: 18    Encounter Problems       Encounter Problems (Active)       Balance       STG - Maintains dynamic standing balance without upper extremity support x 5' with dual task independently  (Progressing)       Start:  12/20/24    Expected End:  01/03/25               Mobility       STG - Patient will ambulate without device 75' independently  (Progressing)       Start:  12/20/24    Expected End:  01/03/25            STG - Patient will ascend and descend a flight of stairs with rail mod I  (Progressing)       Start:  12/20/24    Expected End:  01/03/25               PT Transfers       STG - Patient will transfer sit to and from stand mod I  (Progressing)       Start:  12/20/24    Expected End:  01/03/25               Pain - Adult              Education Documentation  Precautions, taught by Kayla Avalos PT at 12/20/2024  1:50 PM.  Learner: Patient  Readiness: Acceptance  Method: Explanation  Response: Verbalizes Understanding  Comment: Educated on role and fit of cervical collar, prn for comfort    Body Mechanics, taught by Kayla Avalos PT at 12/20/2024  1:50 PM.  Learner: Patient  Readiness: Acceptance  Method: Explanation  Response: Verbalizes Understanding  Comment: Educated on role and fit of cervical collar, prn for comfort    Mobility Training, taught by Kayla Avalos PT at 12/20/2024  1:50 PM.  Learner: Patient  Readiness: Acceptance  Method: Explanation  Response: Verbalizes Understanding  Comment: Educated on role and fit of cervical collar, prn for comfort    Education Comments  No  comments found.

## 2024-12-20 NOTE — PROGRESS NOTES
"Cora Solares is a 44 y.o. female on day 0 of admission presenting with Cervical spondylosis with myelopathy.    Subjective   No acute overnight events.  Pain controlled on current regimen.  Denies headaches, paresthesia pain of arms/hands.  Denies chest pain, shortness of breath.         Objective     Physical Exam  Vitals reviewed.   Constitutional:       Appearance: Normal appearance.   HENT:      Head: Normocephalic and atraumatic.   Eyes:      Conjunctiva/sclera: Conjunctivae normal.      Pupils: Pupils are equal, round, and reactive to light.   Cardiovascular:      Rate and Rhythm: Normal rate and regular rhythm.      Pulses: Normal pulses.   Pulmonary:      Effort: Pulmonary effort is normal.   Abdominal:      Palpations: Abdomen is soft.   Musculoskeletal:      Cervical back: Neck supple.      Comments: Midline vertical cervical incision with dressing - no strikethrough, distal incision STEPHAN drain to compressed bulb suction, small amount of sanguinous output, 4/5 left hand grasp, 5/5 right otherwise motor and sensory intact    Skin:     General: Skin is warm and dry.      Capillary Refill: Capillary refill takes less than 2 seconds.   Neurological:      General: No focal deficit present.      Mental Status: She is alert and oriented to person, place, and time.   Psychiatric:         Mood and Affect: Mood normal.         Behavior: Behavior normal.         Thought Content: Thought content normal.         Judgment: Judgment normal.         Last Recorded Vitals  Blood pressure 103/66, pulse 61, temperature 36 °C (96.8 °F), temperature source Temporal, resp. rate 16, height 1.448 m (4' 9\"), weight (!) 44.7 kg (98 lb 10.5 oz), SpO2 100%.  Intake/Output last 3 Shifts:  I/O last 3 completed shifts:  In: 2100 (46.9 mL/kg) [P.O.:720; I.V.:1280 (28.6 mL/kg); IV Piggyback:100]  Out: 1825 (40.8 mL/kg) [Urine:1700 (1.1 mL/kg/hr); Drains:125]  Weight: 44.7 kg     Relevant Results  FL less than 1 hour    Result Date: " 12/19/2024  These images are not reportable by radiology and will not be interpreted by  Radiologists.     Scheduled medications  acetaminophen, 650 mg, oral, q6h  budesonide, 0.5 mg, nebulization, BID  ceFAZolin, 2 g, intravenous, q6h  docusate sodium, 100 mg, oral, BID  formoterol, 20 mcg, nebulization, BID  nortriptyline, 40 mg, oral, Nightly  polyethylene glycol, 17 g, oral, Daily      Continuous medications  lactated Ringer's, 20 mL/hr, Last Rate: Stopped (12/19/24 1740)  lactated Ringer's, 100 mL/hr, Last Rate: 100 mL/hr (12/20/24 0000)      PRN medications  PRN medications: dextrose, dextrose, glucagon, glucagon, HYDROmorphone, naloxone, ondansetron **OR** ondansetron, oxyCODONE, oxyCODONE, tiZANidine  Results for orders placed or performed during the hospital encounter of 12/19/24 (from the past 24 hours)   Type And Screen   Result Value Ref Range    ABO TYPE O     Rh TYPE POS     ANTIBODY SCREEN NEG    VERIFY ABO/Rh Group Test   Result Value Ref Range    ABO TYPE O     Rh TYPE POS    CBC   Result Value Ref Range    WBC 13.6 (H) 4.4 - 11.3 x10*3/uL    nRBC 0.0 0.0 - 0.0 /100 WBCs    RBC 3.29 (L) 4.00 - 5.20 x10*6/uL    Hemoglobin 10.4 (L) 12.0 - 16.0 g/dL    Hematocrit 32.7 (L) 36.0 - 46.0 %    MCV 99 80 - 100 fL    MCH 31.6 26.0 - 34.0 pg    MCHC 31.8 (L) 32.0 - 36.0 g/dL    RDW 13.3 11.5 - 14.5 %    Platelets 311 150 - 450 x10*3/uL   Basic metabolic panel   Result Value Ref Range    Glucose 149 (H) 74 - 99 mg/dL    Sodium 135 (L) 136 - 145 mmol/L    Potassium 4.1 3.5 - 5.3 mmol/L    Chloride 103 98 - 107 mmol/L    Bicarbonate 26 21 - 32 mmol/L    Anion Gap 10 10 - 20 mmol/L    Urea Nitrogen 12 6 - 23 mg/dL    Creatinine 0.56 0.50 - 1.05 mg/dL    eGFR >90 >60 mL/min/1.73m*2    Calcium 8.0 (L) 8.6 - 10.3 mg/dL         Assessment/Plan   Assessment & Plan  Cervical spondylosis with myelopathy    Cervical stenosis of spinal canal    44 year old female POD 1 from C4-C6 cervical laminoplasty - left    - AM labs  reviewed - HgB stable 10.4 from 11.8  - OOB with assistance - PT/OT evaluations  - maintain STEPHAN drain to compressed suction (125 mL sine OR) - will keep drain until tomorrow - nursing to remove if output < 100  - regular diet with BR  - pain control with scheduled Tylenol, PRN Oxycodone, Dilaudid breakthrough, Tizanidine for spasms  - continue Pulmicort/Formoterol  - continue home Nortriptyline  - plan for DC home tomorrow - scripts sent to pharmacy    Updated Dr. Bayron MACIEL spent 30 minutes in the professional and overall care of this patient.      Tanisha Todd, APRN-CNP

## 2024-12-21 VITALS
DIASTOLIC BLOOD PRESSURE: 75 MMHG | HEIGHT: 57 IN | WEIGHT: 98.66 LBS | TEMPERATURE: 98.2 F | HEART RATE: 78 BPM | OXYGEN SATURATION: 98 % | BODY MASS INDEX: 21.28 KG/M2 | SYSTOLIC BLOOD PRESSURE: 117 MMHG | RESPIRATION RATE: 16 BRPM

## 2024-12-21 LAB
ANION GAP SERPL CALC-SCNC: 10 MMOL/L (ref 10–20)
BUN SERPL-MCNC: 11 MG/DL (ref 6–23)
CALCIUM SERPL-MCNC: 7.8 MG/DL (ref 8.6–10.3)
CHLORIDE SERPL-SCNC: 105 MMOL/L (ref 98–107)
CO2 SERPL-SCNC: 26 MMOL/L (ref 21–32)
CREAT SERPL-MCNC: 0.56 MG/DL (ref 0.5–1.05)
EGFRCR SERPLBLD CKD-EPI 2021: >90 ML/MIN/1.73M*2
ERYTHROCYTE [DISTWIDTH] IN BLOOD BY AUTOMATED COUNT: 13.9 % (ref 11.5–14.5)
GLUCOSE SERPL-MCNC: 97 MG/DL (ref 74–99)
HCT VFR BLD AUTO: 30 % (ref 36–46)
HGB BLD-MCNC: 9.6 G/DL (ref 12–16)
MCH RBC QN AUTO: 31.7 PG (ref 26–34)
MCHC RBC AUTO-ENTMCNC: 32 G/DL (ref 32–36)
MCV RBC AUTO: 99 FL (ref 80–100)
NRBC BLD-RTO: 0 /100 WBCS (ref 0–0)
PLATELET # BLD AUTO: 266 X10*3/UL (ref 150–450)
POTASSIUM SERPL-SCNC: 4 MMOL/L (ref 3.5–5.3)
RBC # BLD AUTO: 3.03 X10*6/UL (ref 4–5.2)
SODIUM SERPL-SCNC: 137 MMOL/L (ref 136–145)
WBC # BLD AUTO: 10.1 X10*3/UL (ref 4.4–11.3)

## 2024-12-21 PROCEDURE — 82374 ASSAY BLOOD CARBON DIOXIDE: CPT | Performed by: NURSE PRACTITIONER

## 2024-12-21 PROCEDURE — 7100000011 HC EXTENDED STAY RECOVERY HOURLY - NURSING UNIT

## 2024-12-21 PROCEDURE — 36415 COLL VENOUS BLD VENIPUNCTURE: CPT | Performed by: NURSE PRACTITIONER

## 2024-12-21 PROCEDURE — 97116 GAIT TRAINING THERAPY: CPT | Mod: GP

## 2024-12-21 PROCEDURE — 94640 AIRWAY INHALATION TREATMENT: CPT

## 2024-12-21 PROCEDURE — 2500000002 HC RX 250 W HCPCS SELF ADMINISTERED DRUGS (ALT 637 FOR MEDICARE OP, ALT 636 FOR OP/ED): Performed by: STUDENT IN AN ORGANIZED HEALTH CARE EDUCATION/TRAINING PROGRAM

## 2024-12-21 PROCEDURE — 94760 N-INVAS EAR/PLS OXIMETRY 1: CPT

## 2024-12-21 PROCEDURE — 85027 COMPLETE CBC AUTOMATED: CPT | Performed by: NURSE PRACTITIONER

## 2024-12-21 PROCEDURE — 2500000001 HC RX 250 WO HCPCS SELF ADMINISTERED DRUGS (ALT 637 FOR MEDICARE OP): Performed by: NURSE PRACTITIONER

## 2024-12-21 PROCEDURE — 2500000002 HC RX 250 W HCPCS SELF ADMINISTERED DRUGS (ALT 637 FOR MEDICARE OP, ALT 636 FOR OP/ED): Performed by: NURSE PRACTITIONER

## 2024-12-21 ASSESSMENT — PAIN DESCRIPTION - LOCATION
LOCATION: NECK

## 2024-12-21 ASSESSMENT — PAIN - FUNCTIONAL ASSESSMENT: PAIN_FUNCTIONAL_ASSESSMENT: 0-10

## 2024-12-21 ASSESSMENT — PAIN SCALES - GENERAL
PAINLEVEL_OUTOF10: 8
PAINLEVEL_OUTOF10: 8
PAINLEVEL_OUTOF10: 10 - WORST POSSIBLE PAIN
PAINLEVEL_OUTOF10: 8

## 2024-12-21 ASSESSMENT — COGNITIVE AND FUNCTIONAL STATUS - GENERAL
MOBILITY SCORE: 19
WALKING IN HOSPITAL ROOM: A LITTLE
TURNING FROM BACK TO SIDE WHILE IN FLAT BAD: A LITTLE
STANDING UP FROM CHAIR USING ARMS: A LITTLE
CLIMB 3 TO 5 STEPS WITH RAILING: A LITTLE
MOVING TO AND FROM BED TO CHAIR: A LITTLE

## 2024-12-21 NOTE — CARE PLAN
The patient's goals for the shift include pain management      Problem: Pain - Adult  Goal: Verbalizes/displays adequate comfort level or baseline comfort level  Outcome: Progressing     Problem: Safety - Adult  Goal: Free from fall injury  Outcome: Progressing     Problem: Discharge Planning  Goal: Discharge to home or other facility with appropriate resources  Outcome: Progressing     Problem: Chronic Conditions and Co-morbidities  Goal: Patient's chronic conditions and co-morbidity symptoms are monitored and maintained or improved  Outcome: Progressing     The clinical goals for the shift include pain management

## 2024-12-21 NOTE — PROGRESS NOTES
Physical Therapy    Physical Therapy Treatment    Patient Name: Cora Solares  MRN: [id number]  Department: [address]  Room: [address] [place] [address]  Today's Date: 12/21/2024  Time Calculation  Start Time: 1240  Stop Time: 1248  Time Calculation (min): 8 min         Assessment/Plan   PT Assessment  PT Assessment Results: Decreased strength, Decreased endurance, Impaired balance, Decreased mobility  Rehab Prognosis: Good  Barriers to Discharge Home:  (Pt is now discharging to sister's home. No stairs to enter)  Evaluation/Treatment Tolerance: Patient limited by fatigue, Patient limited by pain  Medical Staff Made Aware: Yes  Strengths: Support of Caregivers  End of Session Communication: Bedside nurse  End of Session Patient Position: Up in chair, Alarm off, not on at start of session  PT Plan  Inpatient/Swing Bed or Outpatient: Inpatient  PT Plan  Treatment/Interventions: Transfer training, Gait training, Stair training, Balance training, Endurance training  PT Plan: Ongoing PT  PT Frequency: 2 times per week  PT Discharge Recommendations: Low intensity level of continued care  Equipment Recommended upon Discharge: Wheeled walker  PT Recommended Transfer Status: Stand by assist  PT - OK to Discharge: Yes      General Visit Information:   PT  Visit  PT Received On: 12/21/24  Response to Previous Treatment: Patient reporting fatigue but able to participate.  General  Reason for Referral: Impaired functional mobility; C4-6 lami  Referred By: Irwin Verma  Past Medical History Relevant to Rehab: Spinal stenosis, asthmas, depression  Prior to Session Communication: Bedside nurse  Patient Position Received: Bed, 3 rail up, Alarm off, not on at start of session  General Comment: Pt pleasant, agreeable to mobility to bathroom. No further mobility due to pt requesting to eat    Subjective   Precautions:  Precautions  Medical Precautions: Fall precautions  Post-Surgical Precautions:  (Cervical spinal precautions)    Vital Signs (Past 2hrs)         Date/Time Vitals Session Patient Position Pulse Resp SpO2 BP MAP (mmHg)    12/21/24 1423 --  --  78  16  98 %  117/75  89                         Objective   Pain:  Pain Assessment  Pain Assessment: 0-10  0-10 (Numeric) Pain Score: 8  Pain Type: Surgical pain  Pain Location: Neck       Treatments:  Bed Mobility  Bed Mobility: Yes  Bed Mobility 1  Bed Mobility 1: Supine to sitting  Level of Assistance 1: Contact guard    Ambulation/Gait Training  Ambulation/Gait Training Performed: Yes  Ambulation/Gait Training 1  Surface 1: Level tile  Device 1: No device  Assistance 1: Contact guard  Quality of Gait 1:  (Decreased preethi)  Comments/Distance (ft) 1: 15'x2, furniture walking  Transfers  Transfer: Yes  Transfer 1  Transfer From 1: Sit to, Stand to  Transfer to 1: Sit, Stand  Technique 1: Sit to stand, Stand to sit  Transfer Level of Assistance 1: Contact guard    Outcome Measures:  [facility] Basic Mobility  Turning from your back to your side while in a flat bed without using bedrails: None  Moving from lying on your back to sitting on the side of a flat bed without using bedrails: A little  Moving to and from bed to chair (including a wheelchair): A little  Standing up from a chair using your arms (e.g. wheelchair or bedside chair): A little  To walk in hospital room: A little  Climbing 3-5 steps with railing: A little  Basic Mobility - Total Score: 19    Education Documentation  Precautions, taught by Olena Patterson PT at 12/21/2024  2:29 PM.  Learner: Patient  Readiness: Acceptance  Method: Explanation  Response: Verbalizes Understanding    Body Mechanics, taught by Olena Patterson PT at 12/21/2024  2:29 PM.  Learner: Patient  Readiness: Acceptance  Method: Explanation  Response: Verbalizes Understanding    Mobility Training, taught by Olena Patterson PT at 12/21/2024  2:29 PM.  Learner: Patient  Readiness: Acceptance  Method: Explanation  Response: Verbalizes Understanding    Education Comments  No  comments found.        OP EDUCATION:       Encounter Problems       Encounter Problems (Active)       Balance       STG - Maintains dynamic standing balance without upper extremity support x 5' with dual task independently  (Progressing)       Start:  12/20/24    Expected End:  01/03/25               Mobility       STG - Patient will ambulate without device 75' independently  (Progressing)       Start:  12/20/24    Expected End:  01/03/25            STG - Patient will ascend and descend a flight of stairs with rail mod I  (Progressing)       Start:  12/20/24    Expected End:  01/03/25               PT Transfers       STG - Patient will transfer sit to and from stand mod I  (Progressing)       Start:  12/20/24    Expected End:  01/03/25               Pain - Adult

## 2024-12-23 NOTE — DISCHARGE SUMMARY
Discharge Diagnosis  Cervical spondylosis with myelopathy    Issues Requiring Follow-Up  Post surgical follow up     Test Results Pending At Discharge  Pending Labs       No current pending labs.            Hospital Course   44 year old female who underwent a partial C3 and C7 laminectomy and C4-C6 cervical laminoplasty.  There were no intraoperative complications and she was discharged to PACU in stable condition.  She was admitted to the floor overnight for monitoring of STEPHAN drain output, pain control, and continued PT.  STEPHAN drain > 100- will hold on removing until POD 2.  Pain controlled.  Tolerating PO intake.  Plan for drain removal and DC home POD 2.     Pertinent Physical Exam At Time of Discharge  Physical Exam  Vitals reviewed.   Constitutional:       Appearance: Normal appearance.   HENT:      Head: Normocephalic and atraumatic.   Eyes:      Conjunctiva/sclera: Conjunctivae normal.      Pupils: Pupils are equal, round, and reactive to light.   Cardiovascular:      Rate and Rhythm: Normal rate and regular rhythm.      Pulses: Normal pulses.   Pulmonary:      Effort: Pulmonary effort is normal.   Abdominal:      Palpations: Abdomen is soft.   Musculoskeletal:      Cervical back: Neck supple.      Comments: Midline vertical cervical incision with dressing - no strikethrough, distal incision STEPHAN drain to compressed bulb suction, small amount of sanguinous output, 4/5 left hand grasp, 5/5 right otherwise motor and sensory intact   Skin:     General: Skin is warm and dry.      Capillary Refill: Capillary refill takes less than 2 seconds.   Neurological:      General: No focal deficit present.      Mental Status: She is alert and oriented to person, place, and time.   Psychiatric:         Mood and Affect: Mood normal.         Behavior: Behavior normal.         Thought Content: Thought content normal.         Judgment: Judgment normal.       Home Medications     Medication List      START taking these medications      docusate sodium 100 mg capsule; Commonly known as: Colace; Take 1   capsule (100 mg) by mouth 2 times a day for 10 days.   oxyCODONE-acetaminophen 5-325 mg tablet; Commonly known as: Percocet;   Take 1 tablet by mouth every 4 hours if needed for severe pain (7 - 10)   for up to 6 days.   polyethylene glycol 17 gram/dose powder; Commonly known as: Glycolax,   Miralax; Mix 17 g of powder and drink once daily for 10 days     CHANGE how you take these medications     tiZANidine 2 mg tablet; Commonly known as: Zanaflex; Take 1 tablet (2   mg) by mouth every 8 hours if needed for muscle spasms for up to 14 days.;   What changed: when to take this     CONTINUE taking these medications     albuterol 90 mcg/actuation aerosol powdr breath activated inhaler   nortriptyline 10 mg capsule; Commonly known as: Pamelor; Take 4 capsules   (40 mg) by mouth once daily at bedtime.   Symbicort 160-4.5 mcg/actuation inhaler; Generic drug:   budesonide-formoteroL     STOP taking these medications     ibuprofen 800 mg tablet       Outpatient Follow-Up  Future Appointments   Date Time Provider Department Center   12/26/2024  3:30 PM Shawnee Castellon APRN-CNP [id number] [facility]   1/30/2025 10:00 AM MD Mario [id number] [facility]       ENA Angulo-CNP

## 2024-12-26 ENCOUNTER — APPOINTMENT (OUTPATIENT)
Dept: PRIMARY CARE | Facility: CLINIC | Age: 44
End: 2024-12-26
Payer: COMMERCIAL

## 2024-12-26 ENCOUNTER — TELEPHONE (OUTPATIENT)
Dept: NEUROSURGERY | Facility: HOSPITAL | Age: 44
End: 2024-12-26

## 2024-12-26 DIAGNOSIS — M47.12 CERVICAL SPONDYLOSIS WITH MYELOPATHY: ICD-10-CM

## 2024-12-26 DIAGNOSIS — Z98.890 H/O CERVICAL SPINE SURGERY: ICD-10-CM

## 2024-12-26 DIAGNOSIS — G89.18 ACUTE POST-OPERATIVE PAIN: ICD-10-CM

## 2024-12-26 RX ORDER — OXYCODONE HYDROCHLORIDE 5 MG/1
5 TABLET ORAL EVERY 6 HOURS PRN
Qty: 28 TABLET | Refills: 0 | Status: CANCELLED | OUTPATIENT
Start: 2024-12-26 | End: 2025-01-02

## 2024-12-26 NOTE — TELEPHONE ENCOUNTER
I had the pleasure of speaking to Cora Solares who had partial C3 and C7 laminectomy and C4-C6 cervical laminoplasty on 12/19/24 with Dr. Irwin Verma of Neurosurgery. She called with continued pain that she said is next to incision. She noted she is ambulating and denies any swelling, discharge, open incision, or fever. She did note some very light redness. We discussed s.sx of incision infection and she understood and noted that if she develops any she will go to nearest emergency dept or urgent care of prompt evaluation. We spoke about taking tylenol and ibuprofen for discomfort and staggering them and following  directions. She requested a refill of Oxycodone and a request was sent to provider for review. We spoke about continuing stool softener while taking narcotic pain medicaiton and the possible need of laxatives if she does not have a bowl movement every few days. She noted she had a bowl movement today. All questions were answered. She will call back with any further questions, concerns, or updates.

## 2024-12-27 RX ORDER — OXYCODONE HYDROCHLORIDE 5 MG/1
5 TABLET ORAL EVERY 6 HOURS PRN
Qty: 28 TABLET | Refills: 0 | Status: SHIPPED | OUTPATIENT
Start: 2024-12-27 | End: 2025-01-03

## 2024-12-27 NOTE — ADDENDUM NOTE
Addendum  created 12/27/24 1314 by BEENA Zambrano    Intraprocedure Meds edited, Orders acknowledged in Narrator

## 2025-01-09 DIAGNOSIS — G89.18 ACUTE POSTOPERATIVE PAIN: ICD-10-CM

## 2025-01-09 DIAGNOSIS — M47.12 CERVICAL SPONDYLOSIS WITH MYELOPATHY: ICD-10-CM

## 2025-01-09 DIAGNOSIS — Z98.890 H/O CERVICAL SPINE SURGERY: ICD-10-CM

## 2025-01-09 RX ORDER — OXYCODONE HYDROCHLORIDE 5 MG/1
5 TABLET ORAL EVERY 6 HOURS PRN
Qty: 28 TABLET | Refills: 0 | Status: SHIPPED | OUTPATIENT
Start: 2025-01-09 | End: 2025-01-16

## 2025-01-20 ENCOUNTER — APPOINTMENT (OUTPATIENT)
Dept: PRIMARY CARE | Facility: CLINIC | Age: 45
End: 2025-01-20
Payer: COMMERCIAL

## 2025-01-24 ENCOUNTER — TELEPHONE (OUTPATIENT)
Dept: NEUROSURGERY | Facility: HOSPITAL | Age: 45
End: 2025-01-24
Payer: COMMERCIAL

## 2025-01-27 DIAGNOSIS — Z98.890 H/O CERVICAL SPINE SURGERY: ICD-10-CM

## 2025-01-27 DIAGNOSIS — M62.838 MUSCLE SPASM: ICD-10-CM

## 2025-01-27 RX ORDER — DIAZEPAM 5 MG/1
5 TABLET ORAL EVERY 8 HOURS PRN
Qty: 21 TABLET | Refills: 0 | Status: SHIPPED | OUTPATIENT
Start: 2025-01-27 | End: 2025-02-05 | Stop reason: ALTCHOICE

## 2025-01-27 RX ORDER — TIZANIDINE 2 MG/1
2 TABLET ORAL EVERY 6 HOURS PRN
Qty: 40 TABLET | Refills: 0 | Status: SHIPPED | OUTPATIENT
Start: 2025-01-27 | End: 2025-02-05 | Stop reason: ALTCHOICE

## 2025-01-29 NOTE — PROGRESS NOTES
Cora Solares is a very nice 44 year old woman who presents 6 week post op C4-6 posterior cervical laminoplasty on 12/19/24 for cervical spondylosis with myelopathy. Neurologically, she is overall doing well with improved  strength. She still has mild left  weakness 4+ out of 5, but improved from pre-op. She has otherwise full strength in all other muscle groups. Unfortunately, she has severe muscle spasms in neck and trapezius area and post-op pain. She has history of narcotic dependence and is trying to avoid taking any. She is taking NSAIDs, tizanidine and diazepam as needed. I prescribed medrol dose pack and contacted her pain management doctor, Dr. Hussein Dawson and he is going to get her in next week for better pain control.     Irwin Verma MD

## 2025-01-30 ENCOUNTER — APPOINTMENT (OUTPATIENT)
Dept: NEUROSURGERY | Facility: CLINIC | Age: 45
End: 2025-01-30
Payer: COMMERCIAL

## 2025-01-30 VITALS
BODY MASS INDEX: 21.16 KG/M2 | DIASTOLIC BLOOD PRESSURE: 84 MMHG | HEART RATE: 68 BPM | SYSTOLIC BLOOD PRESSURE: 116 MMHG | HEIGHT: 58 IN | WEIGHT: 100.8 LBS

## 2025-01-30 DIAGNOSIS — M47.12 CERVICAL SPONDYLOSIS WITH MYELOPATHY: Primary | ICD-10-CM

## 2025-01-30 DIAGNOSIS — G89.18 POSTOPERATIVE PAIN AFTER SPINAL SURGERY: ICD-10-CM

## 2025-01-30 DIAGNOSIS — Z98.890 HX OF CERVICAL SPINE SURGERY: ICD-10-CM

## 2025-01-30 PROCEDURE — 3008F BODY MASS INDEX DOCD: CPT | Performed by: NEUROLOGICAL SURGERY

## 2025-01-30 PROCEDURE — 99024 POSTOP FOLLOW-UP VISIT: CPT | Performed by: NEUROLOGICAL SURGERY

## 2025-01-30 RX ORDER — METHYLPREDNISOLONE 4 MG/1
TABLET ORAL
Qty: 1 TABLET | Refills: 0 | Status: SHIPPED | OUTPATIENT
Start: 2025-01-30 | End: 2025-03-01

## 2025-01-30 ASSESSMENT — PAIN SCALES - GENERAL: PAINLEVEL_OUTOF10: 9

## 2025-01-31 ENCOUNTER — HOSPITAL ENCOUNTER (EMERGENCY)
Facility: HOSPITAL | Age: 45
Discharge: HOME | End: 2025-01-31
Payer: COMMERCIAL

## 2025-01-31 ENCOUNTER — APPOINTMENT (OUTPATIENT)
Dept: CARDIOLOGY | Facility: HOSPITAL | Age: 45
End: 2025-01-31
Payer: COMMERCIAL

## 2025-01-31 VITALS
HEART RATE: 118 BPM | WEIGHT: 100 LBS | SYSTOLIC BLOOD PRESSURE: 115 MMHG | TEMPERATURE: 98.6 F | OXYGEN SATURATION: 99 % | RESPIRATION RATE: 20 BRPM | BODY MASS INDEX: 21.57 KG/M2 | DIASTOLIC BLOOD PRESSURE: 72 MMHG | HEIGHT: 57 IN

## 2025-01-31 PROCEDURE — 99283 EMERGENCY DEPT VISIT LOW MDM: CPT

## 2025-01-31 PROCEDURE — 99281 EMR DPT VST MAYX REQ PHY/QHP: CPT

## 2025-01-31 PROCEDURE — 93005 ELECTROCARDIOGRAM TRACING: CPT

## 2025-01-31 ASSESSMENT — COLUMBIA-SUICIDE SEVERITY RATING SCALE - C-SSRS
6. HAVE YOU EVER DONE ANYTHING, STARTED TO DO ANYTHING, OR PREPARED TO DO ANYTHING TO END YOUR LIFE?: NO
1. IN THE PAST MONTH, HAVE YOU WISHED YOU WERE DEAD OR WISHED YOU COULD GO TO SLEEP AND NOT WAKE UP?: NO
2. HAVE YOU ACTUALLY HAD ANY THOUGHTS OF KILLING YOURSELF?: NO

## 2025-01-31 ASSESSMENT — PAIN SCALES - GENERAL: PAINLEVEL_OUTOF10: 10 - WORST POSSIBLE PAIN

## 2025-01-31 ASSESSMENT — PAIN - FUNCTIONAL ASSESSMENT: PAIN_FUNCTIONAL_ASSESSMENT: 0-10

## 2025-01-31 NOTE — ED TRIAGE NOTES
Pt to ed via private vehicle from home c/o SOB, dizziness started this morning. Per pt, 6 weeks post op cervical surgery. Pt states had follow up appointment yesterday without symptoms, pt states told to come to hospital if develops dizziness and/or shortness of breath as could be a post-op complication. Pt also endorses nausea, denies diarrhea and vomiting at this time. States has had chills but hasn't taken temperature to know if fever present or not. Pt ambulating with walker at this time

## 2025-02-03 LAB
ATRIAL RATE: 105 BPM
P AXIS: 69 DEGREES
PR INTERVAL: 97 MS
Q ONSET: 252 MS
QRS COUNT: 16 BEATS
QRS DURATION: 92 MS
QT INTERVAL: 335 MS
QTC CALCULATION(BAZETT): 441 MS
QTC FREDERICIA: 402 MS
R AXIS: 57 DEGREES
T AXIS: 56 DEGREES
T OFFSET: 420 MS
VENTRICULAR RATE: 104 BPM

## 2025-02-04 DIAGNOSIS — Z98.890 H/O CERVICAL SPINE SURGERY: ICD-10-CM

## 2025-02-04 DIAGNOSIS — M62.838 MUSCLE SPASM: ICD-10-CM

## 2025-02-05 ENCOUNTER — OFFICE VISIT (OUTPATIENT)
Dept: PAIN MEDICINE | Facility: HOSPITAL | Age: 45
End: 2025-02-05
Payer: COMMERCIAL

## 2025-02-05 VITALS — SYSTOLIC BLOOD PRESSURE: 102 MMHG | DIASTOLIC BLOOD PRESSURE: 65 MMHG | WEIGHT: 100 LBS | BODY MASS INDEX: 21.64 KG/M2

## 2025-02-05 DIAGNOSIS — M79.2 NEUROPATHIC PAIN: ICD-10-CM

## 2025-02-05 DIAGNOSIS — M54.12 CERVICAL RADICULOPATHY: ICD-10-CM

## 2025-02-05 DIAGNOSIS — F32.9 REACTIVE DEPRESSION: Primary | ICD-10-CM

## 2025-02-05 DIAGNOSIS — M62.838 MUSCLE SPASM: ICD-10-CM

## 2025-02-05 DIAGNOSIS — M79.18 MYOFASCIAL PAIN: ICD-10-CM

## 2025-02-05 DIAGNOSIS — G89.28 OTHER CHRONIC POSTPROCEDURAL PAIN: ICD-10-CM

## 2025-02-05 PROCEDURE — 99214 OFFICE O/P EST MOD 30 MIN: CPT | Performed by: CLINICAL NURSE SPECIALIST

## 2025-02-05 RX ORDER — DULOXETIN HYDROCHLORIDE 60 MG/1
60 CAPSULE, DELAYED RELEASE ORAL DAILY
Qty: 30 CAPSULE | Refills: 2 | Status: SHIPPED | OUTPATIENT
Start: 2025-02-12 | End: 2025-03-14

## 2025-02-05 RX ORDER — DULOXETIN HYDROCHLORIDE 30 MG/1
30 CAPSULE, DELAYED RELEASE ORAL DAILY
Qty: 7 CAPSULE | Refills: 0 | Status: SHIPPED | OUTPATIENT
Start: 2025-02-05 | End: 2025-02-12

## 2025-02-05 RX ORDER — IBUPROFEN 800 MG/1
800 TABLET ORAL 3 TIMES DAILY
Qty: 90 TABLET | Refills: 2 | Status: SHIPPED | OUTPATIENT
Start: 2025-02-05 | End: 2025-03-07

## 2025-02-05 RX ORDER — METHOCARBAMOL 500 MG/1
500 TABLET, FILM COATED ORAL 3 TIMES DAILY PRN
Qty: 90 TABLET | Refills: 1 | Status: SHIPPED | OUTPATIENT
Start: 2025-02-05 | End: 2025-03-07

## 2025-02-05 ASSESSMENT — PATIENT HEALTH QUESTIONNAIRE - PHQ9
6. FEELING BAD ABOUT YOURSELF - OR THAT YOU ARE A FAILURE OR HAVE LET YOURSELF OR YOUR FAMILY DOWN: NEARLY EVERY DAY
2. FEELING DOWN, DEPRESSED OR HOPELESS: NEARLY EVERY DAY
SUM OF ALL RESPONSES TO PHQ9 QUESTIONS 1 AND 2: 6
8. MOVING OR SPEAKING SO SLOWLY THAT OTHER PEOPLE COULD HAVE NOTICED. OR THE OPPOSITE, BEING SO FIGETY OR RESTLESS THAT YOU HAVE BEEN MOVING AROUND A LOT MORE THAN USUAL: NOT AT ALL
1. LITTLE INTEREST OR PLEASURE IN DOING THINGS: NEARLY EVERY DAY
5. POOR APPETITE OR OVEREATING: NEARLY EVERY DAY
SUM OF ALL RESPONSES TO PHQ QUESTIONS 1-9: 22
7. TROUBLE CONCENTRATING ON THINGS, SUCH AS READING THE NEWSPAPER OR WATCHING TELEVISION: NEARLY EVERY DAY
9. THOUGHTS THAT YOU WOULD BE BETTER OFF DEAD, OR OF HURTING YOURSELF: SEVERAL DAYS
3. TROUBLE FALLING OR STAYING ASLEEP OR SLEEPING TOO MUCH: NEARLY EVERY DAY
10. IF YOU CHECKED OFF ANY PROBLEMS, HOW DIFFICULT HAVE THESE PROBLEMS MADE IT FOR YOU TO DO YOUR WORK, TAKE CARE OF THINGS AT HOME, OR GET ALONG WITH OTHER PEOPLE: EXTREMELY DIFFICULT
4. FEELING TIRED OR HAVING LITTLE ENERGY: NEARLY EVERY DAY

## 2025-02-05 ASSESSMENT — COLUMBIA-SUICIDE SEVERITY RATING SCALE - C-SSRS
2. HAVE YOU ACTUALLY HAD ANY THOUGHTS OF KILLING YOURSELF?: YES
1. IN THE PAST MONTH, HAVE YOU WISHED YOU WERE DEAD OR WISHED YOU COULD GO TO SLEEP AND NOT WAKE UP?: YES
4. HAVE YOU HAD THESE THOUGHTS AND HAD SOME INTENTION OF ACTING ON THEM?: NO
5. HAVE YOU STARTED TO WORK OUT OR WORKED OUT THE DETAILS OF HOW TO KILL YOURSELF? DO YOU INTEND TO CARRY OUT THIS PLAN?: NO
6. HAVE YOU EVER DONE ANYTHING, STARTED TO DO ANYTHING, OR PREPARED TO DO ANYTHING TO END YOUR LIFE?: NO

## 2025-02-05 ASSESSMENT — ENCOUNTER SYMPTOMS
LOSS OF SENSATION IN FEET: 0
OCCASIONAL FEELINGS OF UNSTEADINESS: 1
DEPRESSION: 0

## 2025-02-05 NOTE — ASSESSMENT & PLAN NOTE
45 year-old female with history of occipital neuralgia responding well to previous greater occipital nerve blocks and worsening cervical radicular symptoms presents for follow-up.  Previously did well with greater occipital nerve blocks.  Developed worsening cervical radicular symptoms accompanied by neuropathy in her upper extremities and weakness.  MRI cervical spine consistent with degenerative disc disease and facet arthrosis with moderate spinal canal stenosis at C4-5 and C5-6.  Patient stated that symptoms were unbearable and interfering with quality of her life.  She proceeded with neurosurgery evaluation and is currently status post C3 and C7 laminectomy and C4-C6 laminoplasty on 12/19/2024.  Presenting at today's office visit with significant postoperative cervical pain.  Pain radiating to bilateral upper extremities accompanied by numbness/tingling, weakness and tremors.  She was provided previous oxycodone postoperatively.  Also provided with tizanidine as well as Valium for spasms and recently provided a Medrol Dosepak.  Patient states that none of the above medications have been helpful.  Patient has a known history of narcotic abuse and at this time would not benefit from additional opiates.  I think we can do a better job of managing her pain with adjuvant medications such as NSAIDs, muscle relaxants and neuromodulators.  She is continuing her Medrol Dosepak and will complete this prescription in 1 to 2 days.  I recommend she discontinue her tizanidine and Valium as they have not been effective for muscle tightness and spasms and the patient has a very low blood pressure which could be exacerbated by the tizanidine.  We will rotate the patient to Robaxin 500 mg which she can take up to 3 times per day as needed for muscle tightness and spasms.  Recommended she dispose of remaining Valium and tizanidine.  Will add a prescription for ibuprofen 800 mg which she can take up to 3 times per day as needed  for pain after completing her Medrol Dosepak. Cannot use Celebrex as patient has a sulfa allergy.  She does not want to restart her nortriptyline as she felt it was ineffective.  I do think she could benefit from a different neuromodulator.  She does not want to try Lyrica stating that her brother took his own life while taking Lyrica.  She also continues to use marijuana products.  I think she could benefit from the addition of duloxetine which could help her neuropathic component of pain and may also give her some benefit with depression.  She has tolerated duloxetine in the past without adverse effects.  Will start patient on duloxetine 30 mg for 7 days and if tolerated will increase to 60 mg daily. She is expressing symptoms of depression related to her significant pain.  Currently denies thoughts of harming herself.  Discussed with patient that chronic pain can exacerbate depression.  Recommend that the patient proceed with a psychiatry evaluation for assistance with coping mechanisms that may help with her chronic pain/depression. Patient is agreeable to a psychiatry evaluation and potential counseling.  Advised patient to seek immediate help if she has thoughts of harming herself.  Provided suicide prevention hotline information.  I will see the patient back in 2 weeks and if she does not have significant improvement in pain we could consider adding an additional neuromodulator such as gabapentin.  Recommended patient discuss the appropriate time to start a formal physical therapy with her neurosurgeon.  Plan reviewed with patient and her partner at today's visit.    -Patient will discontinue tizanidine and Valium.  Advised patient to dispose of these medications.  Adding Robaxin 500 mg up to 3 times per day as needed for muscle tightness and spasm.  Reviewed side effects with patient.  -Adding duloxetine 30 mg daily for 7 days and if tolerated patient will increase to 60 mg daily.  Reviewed potential side  effects with patient.  Patient previously tolerated duloxetine without adverse effects.  -Adding ibuprofen 800 mg which she may take up to 3 times per day as needed for pain. The patient was cautioned about possible side effects and risks of this medication including stomach upset, stomach ulcers, kidney risks and cardiovascular risks to include hypertension and slightly increased risks of stroke and myocardial infarction. Also discussed were ways to minimize these risks by taking this medication with food, staying well-hydrated, watching for any hypertension, and taking the medication with a stomach protectant such as pepcid, zantac, or a proton pump inhibitor.  Reviewed CMP with adequate renal function.  -May consider the addition of gabapentin if the above noted medications are not effective.  -If patient fails to receive relief with current medication we could consider ketamine infusions for chronic pain/depression.   -Provided a referral to psychiatry for chronic pain/depression/coping mechanisms.  Contacted  psychiatry and they will reach out to the patient tomorrow by phone to set up a virtual visit.  Also provided patient with suicide prevention hotline information.  -Patient will follow-up with our office in 2 weeks or sooner if needed.

## 2025-02-05 NOTE — PROGRESS NOTES
Subjective   Patient ID: Cora Solares is a 45 y.o. female who presents for cervical pain.    HPI    45-year-old female presents for follow-up of cervical pain.  Patient previously seen for cervical radicular symptoms and chronic headaches as well as myofascial pain and muscle tightness.  Previously did well with greater occipital nerve blocks.  Presented at her last office visit with an increase in cervical pain radiating posteriorly to the base of her skull and bilateral upper extremities accompanied by increasing weakness and numbness/tingling in her upper extremities.  Failed physical therapy.  Sent for cervical MRI consistent with degenerative disc disease and facet arthrosis with moderate spinal canal stenosis at C4-5 and C5-6.  At that point patient proceeded with neurosurgical evaluation and was scheduled for C3 and C7 laminectomy and C4-C6 laminoplasty on 12/19/2024.  Presenting at today's office visit for follow-up after her surgery with significant postoperative pain.  Currently experiencing cervical pain with radiation to bilateral upper extremities accompanied by numbness/tingling and weakness.  Pain is accompanied by significant muscle tightness and spasms throughout her paracervical region.  She is experiencing tremors in bilateral hands.  Denies any issues with balance or recent falls.  She is utilizing a walker for stability.  She has been reevaluated by her neurosurgeon and was provided 2 prescriptions of oxycodone postoperatively.  Patient was also given tizanidine as well as Valium for muscle tightness/spasms.  She was  recently provided a Medrol Dosepak which she is currently taking.  She stopped her nortriptyline after surgery stating that she misunderstood and did not realize she was supposed to continue this medication.  States that she is not sure how much relief the nortriptyline was providing.  Continues to intermittently utilize marijuana products.  She will supplement with Tylenol and  "ibuprofen.  She has not started any formal physical therapy.  Patient is endorsing significant feelings of depression and states that at times she wishes she were dead secondary to significant pain.  She intermittently has thoughts of harming herself due to significant pain, however, she states she has no plan and would not follow through with those thoughts.  Denies any current thoughts of harming herself.  She previously was associated with [facility] and felt that counseling was beneficial.  She also was previously on duloxetine for depression and felt that it was helpful.  She tolerated the duloxetine without adverse effects.      Patient states she has neck pain that radiates into bilateral arms with tremors.     \"10/10\"    Injection  8/2/24 ONB Left- with moderate relief    Medications: Nortriptyline 4 capsules @ bedtime-off for two months/Tizanidine PRN-was not helping but it is not helping taking TID/Ibuprofen and Valium from surgeon    [facility]:  Merle Kc, APRN-CNP, APRN-CNS on 2/5/2025  4:00 PM  I have personally reviewed the [facility] report for Cora Solares. I have considered the risks of abuse, dependence, addiction and diversion    Is the patient prescribed a combination of a benzodiazepine and opioid?  No    Last Urine Drug Screen / ordered today: No  No results found for this or any previous visit (from the past 8760 hours).  N/A        Review of Systems    ROS:   General: No fevers, chills, weight loss  Skin: Negative for lesions  Eyes: No acute vision changes  Ears: No vertigo  Nose, mouth, throat: No difficulty swallowing or speaking  Respiratory: No cough, shortness of breath, cyanosis  Cardiovascular: Negative for chest pain syncope or palpitation  Gastrointestinal: No constipation, nausea, vomiting  Neurological: Negative for headache, positive for: Paresthesia, weakness and tremor   Psychological: Positive for feelings of depression, denies anxiety. Negative memory " loss  Musculoskeletal: Positive for arthralgia, myalgia, pain and spasm  Endocrine: Negative for weight gain, appetite changes, excessive sweating  Allergy/immune: Negative    All 13 systems were reviewed and are within normal levels except as noted or in the history of present illness.  Positive or pertinent negative responses are noted or were in the history of present illness. As noted, the patient denies significant or impairing weakness in the bilateral upper and lower extremities, medication induced constipation, and bowel or bladder incontinence.     Current Outpatient Medications:     albuterol 90 mcg/actuation aerosol powdr breath activated inhaler, Inhale 2 puffs every 6 hours., Disp: , Rfl:     budesonide-formoteroL (Symbicort) 160-4.5 mcg/actuation inhaler, Inhale 2 puffs 2 times a day., Disp: , Rfl:     methylPREDNISolone (Medrol Dospak) 4 mg tablets, Take as directed on package, Disp: 1 tablet, Rfl: 0    polyethylene glycol (Glycolax, Miralax) 17 gram/dose powder, Mix 17 g of powder and drink once daily for 10 days, Disp: 238 g, Rfl: 0    DULoxetine (Cymbalta) 30 mg DR capsule, Take 1 capsule (30 mg) by mouth once daily for 7 days. Do not crush or chew., Disp: 7 capsule, Rfl: 0    [START ON 2/12/2025] DULoxetine (Cymbalta) 60 mg DR capsule, Take 1 capsule (60 mg) by mouth once daily. Do not crush or chew.  Start duloxetine 60 mg if tolerating duloxetine 30 mg for 1 week. Do not fill before February 12, 2025., Disp: 30 capsule, Rfl: 2    ibuprofen 800 mg tablet, Take 1 tablet (800 mg) by mouth 3 times a day. Take with food, Disp: 90 tablet, Rfl: 2    methocarbamol (Robaxin) 500 mg tablet, Take 1 tablet (500 mg) by mouth 3 times a day as needed for muscle spasms., Disp: 90 tablet, Rfl: 1     Past Medical History:   Diagnosis Date    Acute laryngitis 03/08/2016    Acute laryngitis    Asthma     Cervical disc disease     Cutaneous abscess of chest wall 05/28/2014    Chest wall abscess    Cutaneous abscess  of left lower limb 10/18/2016    Abscess of left thigh    Cutaneous abscess of right lower limb 08/14/2014    Abscess of right thigh    Depression     Headache 12/2021    Noninfective gastroenteritis and colitis, unspecified 10/21/2014    Acute gastroenteritis    Other fecal abnormalities 02/02/2016    Loose stools    Personal history of other diseases of the respiratory system 09/26/2016    History of acute sinusitis    Personal history of other diseases of the respiratory system 01/13/2015    History of influenza    Personal history of other infectious and parasitic diseases 11/30/2015    History of herpes labialis    Personal history of other specified conditions 04/11/2016    History of nausea    Personal history of other specified conditions 11/30/2015    History of dysuria    Personal history of urinary (tract) infections 12/31/2014    History of urinary tract infection    Spinal stenosis     Unspecified abdominal pain 02/26/2016    Abdominal cramping    Unspecified otitis externa, right ear 06/18/2014    Otitis externa of right ear        Past Surgical History:   Procedure Laterality Date    COLONOSCOPY      removed polyp    HYSTERECTOMY  05/19/2014    Hysterectomy    MANDIBLE SURGERY      2023 mass removed        Family History   Problem Relation Name Age of Onset    Hypertension Mother Mom     Diabetes Mother Mom     Hypertension Father Dad     Breast cancer Father's Sister      Breast cancer Father's Sister      Breast cancer Father's Sister      Breast cancer Father's Sister      Breast cancer Father's Sister          Allergies   Allergen Reactions    Magnesium Citrate Other     mouth sores    Penicillins Unknown    Sulfa (Sulfonamide Antibiotics) Unknown    Sulfamethoxazole-Trimethoprim Other        Objective     Visit Vitals  /65   Wt 45.4 kg (100 lb)   BMI 21.64 kg/m²   OB Status Hysterectomy   Smoking Status Every Day   BSA 1.35 m²        Physical Exam    PE:  General: Well-developed,  well-nourished, no acute distress. The patient demonstrates no pain behavior, symptom magnification or overt drug-seeking behavior.  Eye: Pupils appropriate for room lighting  Neck/thyroid: No obvious goiter or enlargement of neck noted  Respiratory exam: Normal respiratory effort, unlabored respiration. No accessory muscle use noted  Cardiac exam: Bilateral radial pulses intact  Abdominal: Nondistended  Spine, cervical: Tenderness to paraspinous musculature paracervical region bilaterally.  Significant muscle tightness and myofascial tenderness upper trapezius muscles bilaterally.  Flexion and extension limited.  Rotational twisting limited secondary to stiffness.  Healing cervical surgical incision.  Spine, lumbar: The patient is able to rise from a seated to standing position without hesitancy, push off, or delay. Gait is grossly nonantalgic.  No tenderness to paraspinous musculature is noted.  Neurologic exam: Muscle strength is antigravity in all 4 extremities.  Psychiatric exam: Judgment and insight normal, affect normal, speech is fluent, affect appropriate, demonstrating no signs of hypersomnolence, sedation, or confusion            Assessment/Plan   Problem List Items Addressed This Visit             ICD-10-CM    Depression - Primary F32.A    Relevant Orders    Referral to Psychiatry    Myofascial pain M79.18    Cervical radiculopathy M54.12     45 year-old female with history of occipital neuralgia responding well to previous greater occipital nerve blocks and worsening cervical radicular symptoms presents for follow-up.  Previously did well with greater occipital nerve blocks.  Developed worsening cervical radicular symptoms accompanied by neuropathy in her upper extremities and weakness.  MRI cervical spine consistent with degenerative disc disease and facet arthrosis with moderate spinal canal stenosis at C4-5 and C5-6.  Patient stated that symptoms were unbearable and interfering with quality of her  life.  She proceeded with neurosurgery evaluation and is currently status post C3 and C7 laminectomy and C4-C6 laminoplasty on 12/19/2024.  Presenting at today's office visit with significant postoperative cervical pain.  Pain radiating to bilateral upper extremities accompanied by numbness/tingling, weakness and tremors.  She was provided previous oxycodone postoperatively.  Also provided with tizanidine as well as Valium for spasms and recently provided a Medrol Dosepak.  Patient states that none of the above medications have been helpful.  Patient has a known history of narcotic abuse and at this time would not benefit from additional opiates.  I think we can do a better job of managing her pain with adjuvant medications such as NSAIDs, muscle relaxants and neuromodulators.  She is continuing her Medrol Dosepak and will complete this prescription in 1 to 2 days.  I recommend she discontinue her tizanidine and Valium as they have not been effective for muscle tightness and spasms and the patient has a very low blood pressure which could be exacerbated by the tizanidine.  We will rotate the patient to Robaxin 500 mg which she can take up to 3 times per day as needed for muscle tightness and spasms.  Recommended she dispose of remaining Valium and tizanidine.  Will add a prescription for ibuprofen 800 mg which she can take up to 3 times per day as needed for pain after completing her Medrol Dosepak. Cannot use Celebrex as patient has a sulfa allergy.  She does not want to restart her nortriptyline as she felt it was ineffective.  I do think she could benefit from a different neuromodulator.  She does not want to try Lyrica stating that her brother took his own life while taking Lyrica.  She also continues to use marijuana products.  I think she could benefit from the addition of duloxetine which could help her neuropathic component of pain and may also give her some benefit with depression.  She has tolerated  duloxetine in the past without adverse effects.  Will start patient on duloxetine 30 mg for 7 days and if tolerated will increase to 60 mg daily. She is expressing symptoms of depression related to her significant pain.  Currently denies thoughts of harming herself.  Discussed with patient that chronic pain can exacerbate depression.  Recommend that the patient proceed with a psychiatry evaluation for assistance with coping mechanisms that may help with her chronic pain/depression. Patient is agreeable to a psychiatry evaluation and potential counseling.  Advised patient to seek immediate help if she has thoughts of harming herself.  Provided suicide prevention hotline information.  I will see the patient back in 2 weeks and if she does not have significant improvement in pain we could consider adding an additional neuromodulator such as gabapentin.  Recommended patient discuss the appropriate time to start a formal physical therapy with her neurosurgeon.  Plan reviewed with patient and her partner at today's visit.    -Patient will discontinue tizanidine and Valium.  Advised patient to dispose of these medications.  Adding Robaxin 500 mg up to 3 times per day as needed for muscle tightness and spasm.  Reviewed side effects with patient.  -Adding duloxetine 30 mg daily for 7 days and if tolerated patient will increase to 60 mg daily.  Reviewed potential side effects with patient.  Patient previously tolerated duloxetine without adverse effects.  -Adding ibuprofen 800 mg which she may take up to 3 times per day as needed for pain. The patient was cautioned about possible side effects and risks of this medication including stomach upset, stomach ulcers, kidney risks and cardiovascular risks to include hypertension and slightly increased risks of stroke and myocardial infarction. Also discussed were ways to minimize these risks by taking this medication with food, staying well-hydrated, watching for any hypertension,  and taking the medication with a stomach protectant such as pepcid, zantac, or a proton pump inhibitor.  Reviewed CMP with adequate renal function.  -May consider the addition of gabapentin if the above noted medications are not effective.  -If patient fails to receive relief with current medication we could consider ketamine infusions for chronic pain/depression.   -Provided a referral to psychiatry for chronic pain/depression/coping mechanisms.  Contacted  psychiatry and they will reach out to the patient tomorrow by phone to set up a virtual visit.  Also provided patient with suicide prevention hotline information.  -Patient will follow-up with our office in 2 weeks or sooner if needed.         Relevant Medications    ibuprofen 800 mg tablet    DULoxetine (Cymbalta) 30 mg DR capsule    DULoxetine (Cymbalta) 60 mg DR capsule (Start on 2/12/2025)     Other Visit Diagnoses         Codes    Other chronic postprocedural pain     G89.28    Relevant Medications    ibuprofen 800 mg tablet    Other Relevant Orders    Referral to Psychiatry    Muscle spasm     M62.838    Relevant Medications    methocarbamol (Robaxin) 500 mg tablet    Neuropathic pain     M79.2    Relevant Medications    DULoxetine (Cymbalta) 30 mg DR capsule    DULoxetine (Cymbalta) 60 mg DR capsule (Start on 2/12/2025)

## 2025-02-06 ENCOUNTER — TELEPHONE (OUTPATIENT)
Dept: PAIN MEDICINE | Facility: HOSPITAL | Age: 45
End: 2025-02-06
Payer: COMMERCIAL

## 2025-02-06 NOTE — TELEPHONE ENCOUNTER
Per Merle Graham called pt and left VM relaying Merle Graham's recommendation for her to try to give Duloxetine and Robaxin a little bit more time but asked her to call us back to discuss this.  Olena Sarmiento RN

## 2025-02-06 NOTE — TELEPHONE ENCOUNTER
Patient calling was advised to call today to give an update how the medication did for the patient ..  Patient advised was throwing up and had Diarrhea all night.

## 2025-02-07 ENCOUNTER — TELEPHONE (OUTPATIENT)
Dept: PRIMARY CARE | Facility: CLINIC | Age: 45
End: 2025-02-07
Payer: COMMERCIAL

## 2025-02-07 DIAGNOSIS — R11.2 NAUSEA AND VOMITING, UNSPECIFIED VOMITING TYPE: Primary | ICD-10-CM

## 2025-02-07 RX ORDER — ONDANSETRON 4 MG/1
4 TABLET, ORALLY DISINTEGRATING ORAL EVERY 8 HOURS PRN
Qty: 20 TABLET | Refills: 0 | Status: SHIPPED | OUTPATIENT
Start: 2025-02-07 | End: 2025-02-14

## 2025-02-07 NOTE — TELEPHONE ENCOUNTER
Patient had surgery and talked with her pain doctor.  She prescribed Duloxetine and Robaxin but they are making her sick.  She wants to know if you can give her Zofran or something for the nausea.    Pharmacy:  [id number] [place], [place]

## 2025-02-12 RX ORDER — TIZANIDINE 2 MG/1
TABLET ORAL
Qty: 40 TABLET | Refills: 0 | OUTPATIENT
Start: 2025-02-12

## 2025-02-24 ENCOUNTER — APPOINTMENT (OUTPATIENT)
Dept: PAIN MEDICINE | Facility: HOSPITAL | Age: 45
End: 2025-02-24
Payer: COMMERCIAL

## 2025-02-27 ENCOUNTER — HOSPITAL ENCOUNTER (OUTPATIENT)
Dept: RADIOLOGY | Facility: HOSPITAL | Age: 45
Discharge: HOME | End: 2025-02-27
Payer: COMMERCIAL

## 2025-02-27 VITALS — HEIGHT: 57 IN | BODY MASS INDEX: 21.57 KG/M2 | WEIGHT: 100 LBS

## 2025-02-27 DIAGNOSIS — R92.8 ABNORMAL MAMMOGRAM: ICD-10-CM

## 2025-02-27 PROCEDURE — 77066 DX MAMMO INCL CAD BI: CPT

## 2025-03-13 ENCOUNTER — APPOINTMENT (OUTPATIENT)
Dept: NEUROSURGERY | Facility: CLINIC | Age: 45
End: 2025-03-13
Payer: COMMERCIAL

## 2025-03-17 ENCOUNTER — OFFICE VISIT (OUTPATIENT)
Dept: PAIN MEDICINE | Facility: HOSPITAL | Age: 45
End: 2025-03-17
Payer: COMMERCIAL

## 2025-03-17 VITALS
DIASTOLIC BLOOD PRESSURE: 86 MMHG | HEART RATE: 78 BPM | BODY MASS INDEX: 21.23 KG/M2 | OXYGEN SATURATION: 100 % | SYSTOLIC BLOOD PRESSURE: 125 MMHG | RESPIRATION RATE: 16 BRPM | HEIGHT: 57 IN | WEIGHT: 98.4 LBS

## 2025-03-17 DIAGNOSIS — M79.2 NEUROPATHIC PAIN: ICD-10-CM

## 2025-03-17 DIAGNOSIS — M54.12 CERVICAL RADICULOPATHY: ICD-10-CM

## 2025-03-17 PROCEDURE — 3008F BODY MASS INDEX DOCD: CPT | Performed by: CLINICAL NURSE SPECIALIST

## 2025-03-17 PROCEDURE — 99214 OFFICE O/P EST MOD 30 MIN: CPT | Performed by: CLINICAL NURSE SPECIALIST

## 2025-03-17 RX ORDER — IBUPROFEN 200 MG
200 TABLET ORAL EVERY 6 HOURS
COMMUNITY

## 2025-03-17 RX ORDER — DULOXETIN HYDROCHLORIDE 30 MG/1
30 CAPSULE, DELAYED RELEASE ORAL DAILY
Qty: 30 CAPSULE | Refills: 2 | Status: SHIPPED | OUTPATIENT
Start: 2025-03-17 | End: 2025-04-16

## 2025-03-17 RX ORDER — DULOXETIN HYDROCHLORIDE 60 MG/1
60 CAPSULE, DELAYED RELEASE ORAL DAILY
Qty: 30 CAPSULE | Refills: 2 | Status: SHIPPED | OUTPATIENT
Start: 2025-03-17 | End: 2025-04-16

## 2025-03-17 ASSESSMENT — ENCOUNTER SYMPTOMS
DEPRESSION: 0
LOSS OF SENSATION IN FEET: 0
OCCASIONAL FEELINGS OF UNSTEADINESS: 0

## 2025-03-17 NOTE — PROGRESS NOTES
Subjective   Patient ID: Cora Solares is a 45 y.o. female who presents for cervical pain.  HPI  45-year-old female with history of cervical pain presents for follow-up.  Cervical pain accompanied by chronic headaches as well as myofascial component to pain.  Previously did well with greater occipital nerve blocks.  Patient noted a significant increase in her cervical symptoms accompanied by upper extremity weakness, numbness/tingling.  Cervical MRI consistent with degenerative disc disease and facet arthrosis with moderate spinal canal stenosis at C4-5 and C5-6.  Patient was sent for neurosurgical evaluation and at that point scheduled for C3 and C7 laminectomy and C4-C6 laminoplasty on 12/19/2024.  Presented at her last office visit with significant postoperative pain accompanied by neuropathic pain bilateral upper extremities.  Pain accompanied by muscle tightness and spasms.  She required a walker for stability secondary to increasing pain/stiffness.  Reevaluated by her neurosurgeon and provided a prescription of oxycodone postoperatively as well as Valium for muscle tightness.  She also was given a Medrol Dosepak which provided some relief.  We discontinued her Valium at her last office visit as well as tizanidine secondary to patient feeling it was not effective.  Added Robaxin for muscle tightness and spasms.  Also added duloxetine with increasing dose to 60 mg daily and ibuprofen for breakthrough pain.  Patient verbalizing increasing depression accompanied by intermittent thoughts of harming herself secondary to significant pain.  Patient was provided a psychiatric referral at her last office visit targeting chronic pain/depression and coping mechanisms.  We contacted  psychiatry who stated they would reach out to the patient the next day to coordinate an appointment.  She was also provided information for suicide prevention hotline. Presenting at today's office visit for routine follow-up.  She  continues to experience cervical pain with radiation to bilateral upper extremities to the level of her hands.  She continues to have some tremoring in her upper extremities as well as neuropathy.  Continues to experience significant muscle tightness and spasms most noted right upper trapezius muscles.  She has noted some improvement in range of motion and stiffness.  Describes her pain as aching and throbbing.  States that the pain is best in the morning and increases as the day progresses.  She does feel that the addition of Robaxin has been helpful for muscle tightness and spasms.  She also feels that duloxetine has been beneficial and is providing some additional relief.  She is supplementing with ibuprofen, Tylenol and Aleve.  She is not taking Aleve and ibuprofen at the same time.  She is utilizing her TENS unit as well as over-the-counter topical ointments which have been irritating her skin.  She thinks that it may be due to the menthol and the over-the-counter topical treatment.  She did follow through with psychiatry evaluation with [facility] and is scheduled to set up a virtual visit.  She denies any thoughts of harming herself or others at today's visit.  Unfortunately missed her follow-up visit with her neurosurgeon and is now scheduled for follow-up on 5/1/2025.  She has not started any formal physical therapy and is waiting for approval from her neurosurgeon.    Patient states she has neck pain that radiates into bilateral arms with tremors. Has not seen much improvement since surgery. Has follow up with neurosurgery on 05/01/2025. Interested in PNS      Pain Score: 8/10     Injection  8/2/24 ONB Left- with moderate relief     Medications: Duloxetine 60mg, Robaxin 500mg TID                OARRS:  Merle Kc, APRN-CNP, APRN-CNS on 3/17/2025  1:09 PM  I have personally reviewed the OARRS report for Cora Solares. I have considered the risks of abuse, dependence, addiction and  diversion      Review of Systems      ROS:   General: No fevers, chills, weight loss  Skin: Negative for lesions  Eyes: No acute vision changes  Ears: No vertigo  Nose, mouth, throat: No difficulty swallowing or speaking  Respiratory: No cough, shortness of breath, cyanosis  Cardiovascular: Negative for chest pain syncope or palpitation  Gastrointestinal: No constipation, nausea, vomiting  Neurological: Negative for headache, positive for: Paresthesia and weakness  Psychological: Negative for severe or debilitating anxiety, depression. Negative memory loss  Musculoskeletal: Positive for arthralgia, myalgia, pain and spasm  Endocrine: Negative for weight gain, appetite changes, excessive sweating  Allergy/immune: Negative    All 13 systems were reviewed and are within normal levels except as noted or in the history of present illness.  Positive or pertinent negative responses are noted or were in the history of present illness. As noted, the patient denies significant or impairing weakness in the bilateral upper and lower extremities, medication induced constipation, and bowel or bladder incontinence.     Current Outpatient Medications   Medication Instructions    albuterol 90 mcg/actuation aerosol powdr breath activated inhaler 2 puffs, Every 6 hours    budesonide-formoteroL (Symbicort) 160-4.5 mcg/actuation inhaler 2 puffs, 2 times daily    DULoxetine (CYMBALTA) 60 mg, oral, Daily, Do not crush or chew.  Patient will take duloxetine 60 mg with duloxetine 30 mg for a total of 90 mg daily.    DULoxetine (CYMBALTA) 30 mg, oral, Daily, Do not crush or chew.  Patient will take duloxetine 30 mg with duloxetine 60 mg for total of 90 mg daily.    ibuprofen 200 mg, Every 6 hours    methocarbamol (ROBAXIN) 500 mg, oral, 3 times daily PRN    polyethylene glycol (Glycolax, Miralax) 17 gram/dose powder Mix 17 g of powder and drink once daily for 10 days        Past Medical History:   Diagnosis Date    Acute laryngitis  03/08/2016    Acute laryngitis    Asthma     Cervical disc disease     Cutaneous abscess of chest wall 05/28/2014    Chest wall abscess    Cutaneous abscess of left lower limb 10/18/2016    Abscess of left thigh    Cutaneous abscess of right lower limb 08/14/2014    Abscess of right thigh    Depression     Headache 12/2021    Low back pain     Neck pain     Noninfective gastroenteritis and colitis, unspecified 10/21/2014    Acute gastroenteritis    Other fecal abnormalities 02/02/2016    Loose stools    Personal history of other diseases of the respiratory system 09/26/2016    History of acute sinusitis    Personal history of other diseases of the respiratory system 01/13/2015    History of influenza    Personal history of other infectious and parasitic diseases 11/30/2015    History of herpes labialis    Personal history of other specified conditions 04/11/2016    History of nausea    Personal history of other specified conditions 11/30/2015    History of dysuria    Personal history of urinary (tract) infections 12/31/2014    History of urinary tract infection    Spinal stenosis     Unspecified abdominal pain 02/26/2016    Abdominal cramping    Unspecified otitis externa, right ear 06/18/2014    Otitis externa of right ear        Past Surgical History:   Procedure Laterality Date    CERVICAL FUSION      COLONOSCOPY      removed polyp    HYSTERECTOMY  05/19/2012    Hysterectomy    LAMINECTOMY      MANDIBLE SURGERY      2023 mass removed    POSTERIOR CERVICAL LAMINECTOMY          Family History   Problem Relation Name Age of Onset    Hypertension Mother Mom     Diabetes Mother Mom     Hypertension Father Dad     Breast cancer Father's Sister      Breast cancer Father's Sister      Breast cancer Father's Sister      Breast cancer Father's Sister      Breast cancer Father's Sister          Allergies   Allergen Reactions    Magnesium Citrate Other     mouth sores    Penicillins Unknown    Sulfa (Sulfonamide Antibiotics)  Unknown    Sulfamethoxazole-Trimethoprim Other        MR cervical spine wo IV contrast 10/26/2024    Narrative  Interpreted By:  Rxoy Fernandez,  STUDY:  MR CERVICAL SPINE WO IV CONTRAST;  10/26/2024 10:17 am    INDICATION:  Signs/Symptoms:cervical pain.    ,M54.12 Radiculopathy, cervical region    COMPARISON:  None.    ACCESSION NUMBER(S):  [id number]    ORDERING CLINICIAN:  TRANG GOMES    TECHNIQUE:  Sagittal T1, T2, STIR, axial T1 and axial T2 weighted images were  acquired through the cervical spine.    FINDINGS:  Alignment: There is slight reversal of the normal cervical lordosis  centered at C4-C5.    Vertebrae/Intervertebral Discs: The vertebral bodies demonstrate  expected height.  There is edema within the endplates at C6-C7 and to  a lesser degree at C5-C6 which is likely degenerative. Bone marrow  signal pattern is otherwise within normal limits. There is desiccated  disc signal throughout the cervical spine with mild disc height loss  at C4-C5 and C5-C6.    Cord: Normal in caliber and signal.    C1-C2: The cervicomedullary junction appears unremarkable. No spinal  canal stenosis.    C2-C3: No spinal canal or neural foraminal stenosis.    C3-C4: Disc osteophyte complex and uncovertebral joint hypertrophy.  No spinal canal or neural foraminal stenosis.    C4-C5: Disc osteophyte complex, uncovertebral joint hypertrophy and  facet arthrosis. Moderate spinal canal and mild bilateral neural  foraminal stenosis.    C5-C6: Right eccentric disc osteophyte complex, uncovertebral joint  hypertrophy and facet arthrosis. Moderate spinal canal and no neural  foraminal stenosis.    C6-C7: Disc osteophyte complex, uncovertebral joint hypertrophy and  facet arthrosis. Mild spinal canal stenosis. Mild bilateral neural  foraminal stenosis, right greater than left.    C7-T1: No spinal canal or neural foraminal stenosis.    Prevertebral soft tissues are not thickened.    Impression  Multilevel degenerative disc disease  and facet arthrosis with  moderate spinal canal stenosis at C4-C5 and C5-C6. Mild neural  foraminal narrowing as detailed above.    MACRO:  None    Signed by: Roxy Fernandez 10/27/2024 5:59 PM  Dictation workstation:   [id number]      XR cervical spine 2-3 views 08/26/2024    Narrative  Interpreted By:  Abdias Faust,  STUDY:  XR CERVICAL SPINE 2-3 VIEWS; ;  8/26/2024 3:18 pm    INDICATION:  Signs/Symptoms:cervical pain.    COMPARISON:  06/14/2017    ACCESSION NUMBER(S):  [id number]    ORDERING CLINICIAN:  TRANG GOMES    TECHNIQUE:  2 views of the cervical spine including AP and lateral views were  obtained.    FINDINGS:  There is no evidence of acute fracture identified. There is mild  retrolisthesis of C4 on C5. No prevertebral soft tissue swelling is  present.  Mild disc space narrowing and small to moderate marginal  osteophytes are present at the C4-5, C5-6 and C6-7 levels. Mild facet  degenerative changes are seen throughout the cervical spine.    Impression  1.  No evidence of acute fracture.  2. Degenerative changes, as above.    MACRO:  None    Signed by: Abdias Faust 8/30/2024 1:19 PM  Dictation workstation:   [id number]      Objective     Vitals:    03/17/25 1306   BP: 125/86   Pulse: 78   Resp: 16   SpO2: 100%               Physical Exam    GENERAL EXAM  Vital Signs: Vital signs to include heart rate, respiration rate, blood pressure, and temperature were reviewed.  General Appearance:  Awake, alert, healthy appearing, well developed, No acute distress.  Head: Normocephalic without evidence of head injury.  Neck: The appearance of the neck was normal without swelling with a midline trachea.  Eyes: The eyelids and eyebrows exhibited no abnormalities.  Pupils were not pin-point.  Sclera was without icterus.  Lungs: Respiration rhythm and depth was normal.  Respiratory movements were normal without labored breathing.  Cardiovascular: No peripheral edema was present.    Spine, cervical: Tenderness to  paraspinous musculature paracervical region bilaterally.  Muscle tightness and myofascial tenderness upper trapezius muscles right greater than left.  Flexion and extension limited.  Rotational twisting improved.  Healing cervical surgical incision.  Neurological: Patient was oriented to time, place, and person.  Speech was normal.  Balance, gait, and stance were unremarkable.  Equal strength bilateral upper extremities 5/5.  Psychiatric: Appearance was normal with appropriate dress.  Mood was euthymic and affect was normal.  Skin: Affected regions were without ecchymosis or skin lesions.            Assessment/Plan   Problem List Items Addressed This Visit             ICD-10-CM    Cervical radiculopathy M54.12     45 year-old female with history of occipital neuralgia responding well to previous greater occipital nerve blocks and worsening cervical radicular symptoms presents for follow-up.  Previously did well with greater occipital nerve blocks.  Developed worsening cervical radicular symptoms accompanied by neuropathy in her upper extremities and weakness.  MRI cervical spine consistent with degenerative disc disease and facet arthrosis with moderate spinal canal stenosis at C4-5 and C5-6.  Patient stated that symptoms were unbearable and interfering with quality of her life.  She proceeded with neurosurgery evaluation and is currently status post C3 and C7 laminectomy and C4-C6 laminoplasty on 12/19/2024.  Presenting at today's office visit with some improvement in her postoperative pain.  She feels that her range of motion is slowly improving.  Continues to experience cervical pain radiating to bilateral upper extremities accompanied by numbness/tingling and tremors in her upper extremities.  Significant muscle tightness and spasms noted in upper trapezius muscles bilaterally.  She does feel that the addition of duloxetine and Robaxin has been beneficial.  Tolerating both medications without adverse effects.   Utilizing ibuprofen, Tylenol and Aleve intermittently for breakthrough pain.  She knows not to take ibuprofen and Aleve at the same time.  Patient cannot take Celebrex secondary to sulfa allergy.  She is utilizing a TENS unit which provides additional relief.  Over-the-counter topical treatments have caused skin irritation and she feels it may be due to menthol.  At this time I think she could benefit from increasing her duloxetine dose to 90 mg daily for additional neuropathic relief.  She currently is tolerating duloxetine without adverse effects.  Advised patient that if she fails to receive significant relief with the increased dose of duloxetine we may consider revisiting gabapentin.  Previously tolerated gabapentin many years ago without adverse effects.  Patient does not want to try Lyrica at this time stating that her brother took his own life while taking Lyrica.  She also continues to utilize marijuana products. Will also add NSAID compounding cream topically at this time for additional pain relief.  Advised patient that opiates remain not indicated for musculoskeletal pain.  Patient also has a history of narcotic abuse and at this time would not benefit from additional opiates.  Recommended she continue home therapy exercises and stretches.  Also recommend she follow-up with her neurosurgeon to discuss proceeding with formal physical therapy.  Advised patient to follow through with  psychiatric/counseling visits targeting chronic pain/depression/coping mechanisms. Plan reviewed with patient at today's visit.    -Continue Robaxin 500 mg up to 3 times per day as needed for muscle tightness and spasm.  Reviewed side effects with patient.  -Increase duloxetine to 90 mg daily for additional neuropathic relief.    -May consider revisiting gabapentin in the future if she fails to receive relief with the above-noted medications.  Previously tolerated gabapentin without adverse effects.  Patient not interested in  Lyrica.   -Continue ibuprofen 800 mg which she may take up to 3 times per day as needed for pain. The patient was cautioned about possible side effects and risks of this medication including stomach upset, stomach ulcers, kidney risks and cardiovascular risks to include hypertension and slightly increased risks of stroke and myocardial infarction. Also discussed were ways to minimize these risks by taking this medication with food, staying well-hydrated, watching for any hypertension, and taking the medication with a stomach protectant such as pepcid, zantac, or a proton pump inhibitor.  Reviewed CMP with adequate renal function.  -Added NSAID compounding cream for topical pain relief.  -If patient fails to receive relief with current medication we could consider ketamine infusions for chronic pain/depression.   -Recommended patient follow through with psychiatric treatment as well as counseling for chronic pain/depression/coping mechanisms.    -Advised patient to contact her neurosurgeon to determine if she is able to proceed with a formal course of physical therapy.  -Patient will follow-up with our office in 6 to 8 weeks for reevaluation.           Relevant Medications    DULoxetine (Cymbalta) 60 mg DR capsule    DULoxetine (Cymbalta) 30 mg DR capsule     Other Visit Diagnoses         Codes    Neuropathic pain     M79.2    Relevant Medications    DULoxetine (Cymbalta) 60 mg DR capsule    DULoxetine (Cymbalta) 30 mg DR capsule                   This note was generated with the aid of dictation software, there may be typos despite my attempts at proofreading.

## 2025-03-17 NOTE — ASSESSMENT & PLAN NOTE
45 year-old female with history of occipital neuralgia responding well to previous greater occipital nerve blocks and worsening cervical radicular symptoms presents for follow-up.  Previously did well with greater occipital nerve blocks.  Developed worsening cervical radicular symptoms accompanied by neuropathy in her upper extremities and weakness.  MRI cervical spine consistent with degenerative disc disease and facet arthrosis with moderate spinal canal stenosis at C4-5 and C5-6.  Patient stated that symptoms were unbearable and interfering with quality of her life.  She proceeded with neurosurgery evaluation and is currently status post C3 and C7 laminectomy and C4-C6 laminoplasty on 12/19/2024.  Presenting at today's office visit with some improvement in her postoperative pain.  She feels that her range of motion is slowly improving.  Continues to experience cervical pain radiating to bilateral upper extremities accompanied by numbness/tingling and tremors in her upper extremities.  Significant muscle tightness and spasms noted in upper trapezius muscles bilaterally.  She does feel that the addition of duloxetine and Robaxin has been beneficial.  Tolerating both medications without adverse effects.  Utilizing ibuprofen, Tylenol and Aleve intermittently for breakthrough pain.  She knows not to take ibuprofen and Aleve at the same time.  Patient cannot take Celebrex secondary to sulfa allergy.  She is utilizing a TENS unit which provides additional relief.  Over-the-counter topical treatments have caused skin irritation and she feels it may be due to menthol.  At this time I think she could benefit from increasing her duloxetine dose to 90 mg daily for additional neuropathic relief.  She currently is tolerating duloxetine without adverse effects.  Advised patient that if she fails to receive significant relief with the increased dose of duloxetine we may consider revisiting gabapentin.  Previously tolerated  gabapentin many years ago without adverse effects.  Patient does not want to try Lyrica at this time stating that her brother took his own life while taking Lyrica.  She also continues to utilize marijuana products. Will also add NSAID compounding cream topically at this time for additional pain relief.  Advised patient that opiates remain not indicated for musculoskeletal pain.  Patient also has a history of narcotic abuse and at this time would not benefit from additional opiates.  Recommended she continue home therapy exercises and stretches.  Also recommend she follow-up with her neurosurgeon to discuss proceeding with formal physical therapy.  Advised patient to follow through with  psychiatric/counseling visits targeting chronic pain/depression/coping mechanisms. Plan reviewed with patient at today's visit.    -Continue Robaxin 500 mg up to 3 times per day as needed for muscle tightness and spasm.  Reviewed side effects with patient.  -Increase duloxetine to 90 mg daily for additional neuropathic relief.    -May consider revisiting gabapentin in the future if she fails to receive relief with the above-noted medications.  Previously tolerated gabapentin without adverse effects.  Patient not interested in Lyrica.   -Continue ibuprofen 800 mg which she may take up to 3 times per day as needed for pain. The patient was cautioned about possible side effects and risks of this medication including stomach upset, stomach ulcers, kidney risks and cardiovascular risks to include hypertension and slightly increased risks of stroke and myocardial infarction. Also discussed were ways to minimize these risks by taking this medication with food, staying well-hydrated, watching for any hypertension, and taking the medication with a stomach protectant such as pepcid, zantac, or a proton pump inhibitor.  Reviewed CMP with adequate renal function.  -Added NSAID compounding cream for topical pain relief.  -If patient fails to  receive relief with current medication we could consider ketamine infusions for chronic pain/depression.   -Recommended patient follow through with psychiatric treatment as well as counseling for chronic pain/depression/coping mechanisms.    -Advised patient to contact her neurosurgeon to determine if she is able to proceed with a formal course of physical therapy.  -Patient will follow-up with our office in 6 to 8 weeks for reevaluation.

## 2025-03-18 NOTE — PROGRESS NOTES
FUV - 14 weeks post op visit s/p C4-6 posterior cervical laminoplasty on 12/19/24 for cervical spondylosis with myelopathy. Last seen on 1/30/25 overall doing well with improved  strength, with continued mild left  weakness 4+ out of 5, but improved from pre-op. Her recovery was complicated by severe muscle spasms in neck and trapezius area and post-op pain. She has worked with Merle OTT with Pain Management now taking Robaxin, Ibuprofen, and Duloxetine. She has a follow up with pain management in 6-8 weeks to evaluate efficacy of pain treatment.

## 2025-03-24 ENCOUNTER — APPOINTMENT (OUTPATIENT)
Dept: PAIN MEDICINE | Facility: HOSPITAL | Age: 45
End: 2025-03-24
Payer: COMMERCIAL

## 2025-03-25 ENCOUNTER — APPOINTMENT (OUTPATIENT)
Dept: NEUROSURGERY | Facility: CLINIC | Age: 45
End: 2025-03-25
Payer: COMMERCIAL

## 2025-03-25 VITALS
HEART RATE: 86 BPM | WEIGHT: 99 LBS | TEMPERATURE: 97.4 F | SYSTOLIC BLOOD PRESSURE: 140 MMHG | RESPIRATION RATE: 20 BRPM | DIASTOLIC BLOOD PRESSURE: 89 MMHG | BODY MASS INDEX: 21.42 KG/M2

## 2025-03-25 DIAGNOSIS — M47.12 CERVICAL SPONDYLOSIS WITH MYELOPATHY: Primary | ICD-10-CM

## 2025-03-25 DIAGNOSIS — Z98.890 H/O CERVICAL SPINE SURGERY: ICD-10-CM

## 2025-03-25 PROCEDURE — 99214 OFFICE O/P EST MOD 30 MIN: CPT | Performed by: NEUROLOGICAL SURGERY

## 2025-03-26 ENCOUNTER — APPOINTMENT (OUTPATIENT)
Dept: PRIMARY CARE | Facility: CLINIC | Age: 45
End: 2025-03-26
Payer: COMMERCIAL

## 2025-03-26 VITALS
HEART RATE: 97 BPM | SYSTOLIC BLOOD PRESSURE: 144 MMHG | HEIGHT: 57 IN | WEIGHT: 97.6 LBS | OXYGEN SATURATION: 98 % | BODY MASS INDEX: 21.06 KG/M2 | TEMPERATURE: 97.8 F | RESPIRATION RATE: 16 BRPM | DIASTOLIC BLOOD PRESSURE: 83 MMHG

## 2025-03-26 DIAGNOSIS — M48.02 CERVICAL STENOSIS OF SPINAL CANAL: Primary | ICD-10-CM

## 2025-03-26 DIAGNOSIS — M62.838 MUSCLE SPASMS OF NECK: ICD-10-CM

## 2025-03-26 DIAGNOSIS — J45.909 REACTIVE AIRWAY DISEASE WITHOUT COMPLICATION, UNSPECIFIED ASTHMA SEVERITY, UNSPECIFIED WHETHER PERSISTENT (HHS-HCC): ICD-10-CM

## 2025-03-26 DIAGNOSIS — H60.501 ACUTE OTITIS EXTERNA OF RIGHT EAR, UNSPECIFIED TYPE: ICD-10-CM

## 2025-03-26 DIAGNOSIS — Z13.220 LIPID SCREENING: ICD-10-CM

## 2025-03-26 DIAGNOSIS — F17.200 SMOKER: ICD-10-CM

## 2025-03-26 DIAGNOSIS — R92.1 BREAST CALCIFICATIONS ON MAMMOGRAM: ICD-10-CM

## 2025-03-26 PROBLEM — F19.20 DRUG DEPENDENCE (MULTI): Status: RESOLVED | Noted: 2023-06-30 | Resolved: 2025-03-26

## 2025-03-26 PROBLEM — F11.21 OPIOID TYPE DEPENDENCE IN REMISSION (MULTI): Status: RESOLVED | Noted: 2023-06-30 | Resolved: 2025-03-26

## 2025-03-26 PROBLEM — F11.20 OPIATE DEPENDENCE, CONTINUOUS (MULTI): Status: RESOLVED | Noted: 2023-06-30 | Resolved: 2025-03-26

## 2025-03-26 PROCEDURE — 3008F BODY MASS INDEX DOCD: CPT | Performed by: INTERNAL MEDICINE

## 2025-03-26 PROCEDURE — 99214 OFFICE O/P EST MOD 30 MIN: CPT | Performed by: INTERNAL MEDICINE

## 2025-03-26 PROCEDURE — 4004F PT TOBACCO SCREEN RCVD TLK: CPT | Performed by: INTERNAL MEDICINE

## 2025-03-26 RX ORDER — NEOMYCIN SULFATE, POLYMYXIN B SULFATE, HYDROCORTISONE 3.5; 10000; 1 MG/ML; [USP'U]/ML; MG/ML
2 SOLUTION/ DROPS AURICULAR (OTIC) 4 TIMES DAILY
Qty: 10 ML | Refills: 0 | Status: SHIPPED | OUTPATIENT
Start: 2025-03-26 | End: 2025-04-02

## 2025-03-26 RX ORDER — VARENICLINE TARTRATE 0.5 MG/1
TABLET, FILM COATED ORAL
Qty: 120 TABLET | Refills: 1 | Status: SHIPPED | OUTPATIENT
Start: 2025-03-26

## 2025-03-26 SDOH — ECONOMIC STABILITY: FOOD INSECURITY: WITHIN THE PAST 12 MONTHS, THE FOOD YOU BOUGHT JUST DIDN'T LAST AND YOU DIDN'T HAVE MONEY TO GET MORE.: NEVER TRUE

## 2025-03-26 SDOH — ECONOMIC STABILITY: FOOD INSECURITY: WITHIN THE PAST 12 MONTHS, YOU WORRIED THAT YOUR FOOD WOULD RUN OUT BEFORE YOU GOT MONEY TO BUY MORE.: NEVER TRUE

## 2025-03-26 ASSESSMENT — LIFESTYLE VARIABLES
AUDIT-C TOTAL SCORE: 0
SKIP TO QUESTIONS 9-10: 1
HOW OFTEN DO YOU HAVE A DRINK CONTAINING ALCOHOL: NEVER
HOW MANY STANDARD DRINKS CONTAINING ALCOHOL DO YOU HAVE ON A TYPICAL DAY: PATIENT DOES NOT DRINK
HOW OFTEN DO YOU HAVE SIX OR MORE DRINKS ON ONE OCCASION: NEVER

## 2025-03-26 NOTE — PROGRESS NOTES
Chief Complaint/HPI:    Chronic neck pain: patient sees pain management. X ray was completed.  Patient is having ongoing neck issues. She is being treated for occipital neuralgia, patient has had ongoing symptoms since 2021. Patient had x rays completed, she has had tension and muscle spasms in the neck.  Patient had cervical laminoplasty completed per Dr Verma. She does see pain management now.   She uses Nex Wave for pain management.  She has ongoing pain when patient looks up and down, this is causing ongoing issues at work. Patient had x rays completed, she has had dry needling procedure. Patient recently has been prescribed a compounded topical med to use to treat the spasms       Patient is s/p hysterectomy    Smoker: patient would like to quit smoking    Dense breast tissue: patient has dense breast tissue, she had mammogram in 2/2025, a follow up mammogram is suggested in 6 months, patient wonders if a different study would be beneficial (breast MRI)    ROS otherwise negative aside from what was mentioned above in HPI.      Patient Active Problem List   Diagnosis    Depression    Cough    Constipation    Chronic neck pain    Reactive airway disease (HHS-HCC)    Asthma    Allergic rhinitis    Elevated MCV    Endometriosis    Abscess    External hemorrhoids    Hand crush injury, left, initial encounter    Herpes labialis    Infected cyst of skin    Occipital neuralgia of left side    Lower abdominal pain    Low back pain    Left breast lump    Insomnia    Opioid abuse, episodic (Multi)    Right hip pain    Right anterior knee pain    Lipid screening    Posterior cervical adenopathy    Myofascial pain    Breast calcifications on mammogram    Cervical radiculopathy    Cervical spondylosis with myelopathy    Cervical stenosis of spinal canal    Smoker    Muscle spasms of neck    Acute otitis externa of right ear         Past Medical History:   Diagnosis Date    Acute laryngitis 03/08/2016    Acute laryngitis     Asthma     Cervical disc disease     Cutaneous abscess of chest wall 05/28/2014    Chest wall abscess    Cutaneous abscess of left lower limb 10/18/2016    Abscess of left thigh    Cutaneous abscess of right lower limb 08/14/2014    Abscess of right thigh    Depression     Headache 12/2021    Low back pain     Neck pain     Noninfective gastroenteritis and colitis, unspecified 10/21/2014    Acute gastroenteritis    Other fecal abnormalities 02/02/2016    Loose stools    Personal history of other diseases of the respiratory system 09/26/2016    History of acute sinusitis    Personal history of other diseases of the respiratory system 01/13/2015    History of influenza    Personal history of other infectious and parasitic diseases 11/30/2015    History of herpes labialis    Personal history of other specified conditions 04/11/2016    History of nausea    Personal history of other specified conditions 11/30/2015    History of dysuria    Personal history of urinary (tract) infections 12/31/2014    History of urinary tract infection    Spinal stenosis     Unspecified abdominal pain 02/26/2016    Abdominal cramping    Unspecified otitis externa, right ear 06/18/2014    Otitis externa of right ear     Past Surgical History:   Procedure Laterality Date    CERVICAL FUSION      COLONOSCOPY      removed polyp    HYSTERECTOMY  05/19/2012    Hysterectomy    LAMINECTOMY  12/19/2024    MANDIBLE SURGERY      2023 mass removed    POSTERIOR CERVICAL LAMINECTOMY       Social History     Social History Narrative    Not on file         ALLERGIES  Magnesium citrate, Penicillins, Sulfa (sulfonamide antibiotics), and Sulfamethoxazole-trimethoprim      MEDICATIONS  Current Outpatient Medications on File Prior to Visit   Medication Sig Dispense Refill    albuterol 90 mcg/actuation aerosol powdr breath activated inhaler Inhale 2 puffs every 6 hours.      budesonide-formoteroL (Symbicort) 160-4.5 mcg/actuation inhaler Inhale 2 puffs 2 times a  "day.      DULoxetine (Cymbalta) 30 mg DR capsule Take 1 capsule (30 mg) by mouth once daily. Do not crush or chew.  Patient will take duloxetine 30 mg with duloxetine 60 mg for total of 90 mg daily. 30 capsule 2    DULoxetine (Cymbalta) 60 mg DR capsule Take 1 capsule (60 mg) by mouth once daily. Do not crush or chew.  Patient will take duloxetine 60 mg with duloxetine 30 mg for a total of 90 mg daily. 30 capsule 2    ibuprofen 200 mg tablet Take 1 tablet (200 mg) by mouth every 6 hours.      methocarbamol (Robaxin) 500 mg tablet Take 1 tablet (500 mg) by mouth 3 times a day as needed for muscle spasms. 90 tablet 1    polyethylene glycol (Glycolax, Miralax) 17 gram/dose powder Mix 17 g of powder and drink once daily for 10 days 238 g 0     No current facility-administered medications on file prior to visit.         PHYSICAL EXAM  /83 (BP Location: Left arm, Patient Position: Sitting, BP Cuff Size: Adult)   Pulse 97   Temp 36.6 °C (97.8 °F) (Temporal)   Resp 16   Ht 1.448 m (4' 9\")   Wt (!) 44.3 kg (97 lb 9.6 oz)   SpO2 98%   BMI 21.12 kg/m²   Body mass index is 21.12 kg/m².  Gen: Alert, NAD, wearing glasses  HEENT:  EOMI, conjunctiva and sclera normal in appearance, no thyromegaly, tender posterior /lateral right neck is noted, the area is tender to palpation, right TM is occluded with cerumen, removed with a loop, right external canal appears erythematous  Respiratory:  Lungs CTAB, slightly diminished breath sounds throughout  Cardiovascular:  Heart RRR. No M/R/G, no peripheral edema noted  Neuro:  Gross motor and sensory intact  Skin:  No suspicious lesions present    ASSESSMENT/PLAN  Problem List Items Addressed This Visit       Acute otitis externa of right ear    Relevant Medications    neomycin-polymyxin-HC (Cortisporin) otic solution    Breast calcifications on mammogram    Relevant Orders    Referral to High Risk Breast Cancer    CBC and Auto Differential    Comprehensive Metabolic Panel    " Cervical stenosis of spinal canal - Primary    Current Assessment & Plan     Post surgery, she now goes to pain management         Relevant Orders    CBC and Auto Differential    Comprehensive Metabolic Panel    Lipid screening    Relevant Orders    Lipid Panel    Muscle spasms of neck    Current Assessment & Plan     Check iron studies, magnesium with next lab testing, see if other causes of spasms are present         Relevant Orders    CBC and Auto Differential    Comprehensive Metabolic Panel    TSH with reflex to Free T4 if abnormal    Magnesium    Iron and TIBC    Reactive airway disease (HHS-HCC)    Current Assessment & Plan     Will try to help the patient quit smoking         Relevant Orders    CBC and Auto Differential    Comprehensive Metabolic Panel    Smoker    Current Assessment & Plan     Would like to quit smoking, trial of Chantix, it is ordered, follow up in 3 months         Relevant Medications    varenicline tartrate (Chantix) 0.5 mg tablet    Other Relevant Orders    CBC and Auto Differential    Comprehensive Metabolic Panel     Patient wonders about additional testing for dense breasts, referred to breast cancer center for review and recommendations    Follow up in 4 months    Enoc Mott MD

## 2025-03-27 ENCOUNTER — TELEPHONE (OUTPATIENT)
Dept: PRIMARY CARE | Facility: CLINIC | Age: 45
End: 2025-03-27
Payer: COMMERCIAL

## 2025-04-03 ENCOUNTER — APPOINTMENT (OUTPATIENT)
Dept: PHYSICAL THERAPY | Facility: HOSPITAL | Age: 45
End: 2025-04-03
Payer: COMMERCIAL

## 2025-04-06 LAB
ALBUMIN SERPL-MCNC: 4.2 G/DL (ref 3.6–5.1)
ALP SERPL-CCNC: 63 U/L (ref 31–125)
ALT SERPL-CCNC: 12 U/L (ref 6–29)
ANION GAP SERPL CALCULATED.4IONS-SCNC: 6 MMOL/L (CALC) (ref 7–17)
AST SERPL-CCNC: 16 U/L (ref 10–30)
BASOPHILS # BLD AUTO: 77 CELLS/UL (ref 0–200)
BASOPHILS NFR BLD AUTO: 0.9 %
BILIRUB SERPL-MCNC: 0.4 MG/DL (ref 0.2–1.2)
BUN SERPL-MCNC: 11 MG/DL (ref 7–25)
CALCIUM SERPL-MCNC: 8.9 MG/DL (ref 8.6–10.2)
CHLORIDE SERPL-SCNC: 105 MMOL/L (ref 98–110)
CHOLEST SERPL-MCNC: 165 MG/DL
CHOLEST/HDLC SERPL: 2.7 (CALC)
CO2 SERPL-SCNC: 26 MMOL/L (ref 20–32)
CREAT SERPL-MCNC: 0.68 MG/DL (ref 0.5–0.99)
EGFRCR SERPLBLD CKD-EPI 2021: 110 ML/MIN/1.73M2
EOSINOPHIL # BLD AUTO: 318 CELLS/UL (ref 15–500)
EOSINOPHIL NFR BLD AUTO: 3.7 %
ERYTHROCYTE [DISTWIDTH] IN BLOOD BY AUTOMATED COUNT: 13.1 % (ref 11–15)
GLUCOSE SERPL-MCNC: 110 MG/DL (ref 65–99)
HCT VFR BLD AUTO: 37.1 % (ref 35–45)
HDLC SERPL-MCNC: 62 MG/DL
HGB BLD-MCNC: 12.3 G/DL (ref 11.7–15.5)
IRON SATN MFR SERPL: 28 % (CALC) (ref 16–45)
IRON SERPL-MCNC: 80 MCG/DL (ref 40–190)
LDLC SERPL CALC-MCNC: 85 MG/DL (CALC)
LYMPHOCYTES # BLD AUTO: 2554 CELLS/UL (ref 850–3900)
LYMPHOCYTES NFR BLD AUTO: 29.7 %
MAGNESIUM SERPL-MCNC: 1.9 MG/DL (ref 1.5–2.5)
MCH RBC QN AUTO: 31.8 PG (ref 27–33)
MCHC RBC AUTO-ENTMCNC: 33.2 G/DL (ref 32–36)
MCV RBC AUTO: 95.9 FL (ref 80–100)
MONOCYTES # BLD AUTO: 920 CELLS/UL (ref 200–950)
MONOCYTES NFR BLD AUTO: 10.7 %
NEUTROPHILS # BLD AUTO: 4730 CELLS/UL (ref 1500–7800)
NEUTROPHILS NFR BLD AUTO: 55 %
NONHDLC SERPL-MCNC: 103 MG/DL (CALC)
PLATELET # BLD AUTO: 384 THOUSAND/UL (ref 140–400)
PMV BLD REES-ECKER: 10.2 FL (ref 7.5–12.5)
POTASSIUM SERPL-SCNC: 4 MMOL/L (ref 3.5–5.3)
PROT SERPL-MCNC: 6.6 G/DL (ref 6.1–8.1)
RBC # BLD AUTO: 3.87 MILLION/UL (ref 3.8–5.1)
SODIUM SERPL-SCNC: 137 MMOL/L (ref 135–146)
TIBC SERPL-MCNC: 281 MCG/DL (CALC) (ref 250–450)
TRIGL SERPL-MCNC: 90 MG/DL
TSH SERPL-ACNC: 2.28 MIU/L
WBC # BLD AUTO: 8.6 THOUSAND/UL (ref 3.8–10.8)

## 2025-04-15 ENCOUNTER — EVALUATION (OUTPATIENT)
Dept: PHYSICAL THERAPY | Facility: HOSPITAL | Age: 45
End: 2025-04-15
Payer: COMMERCIAL

## 2025-04-15 DIAGNOSIS — M47.12 CERVICAL SPONDYLOSIS WITH MYELOPATHY: Primary | ICD-10-CM

## 2025-04-15 DIAGNOSIS — Z98.890 H/O CERVICAL SPINE SURGERY: ICD-10-CM

## 2025-04-15 PROCEDURE — 97162 PT EVAL MOD COMPLEX 30 MIN: CPT | Mod: GP | Performed by: PHYSICAL THERAPIST

## 2025-04-15 ASSESSMENT — PAIN SCALES - GENERAL: PAINLEVEL_OUTOF10: 8

## 2025-04-15 ASSESSMENT — ENCOUNTER SYMPTOMS
LOSS OF SENSATION IN FEET: 1
OCCASIONAL FEELINGS OF UNSTEADINESS: 0
DEPRESSION: 1

## 2025-04-15 ASSESSMENT — PAIN - FUNCTIONAL ASSESSMENT: PAIN_FUNCTIONAL_ASSESSMENT: 0-10

## 2025-04-15 NOTE — PROGRESS NOTES
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 Experiencing muscle spasms, and left UE radicular symptoms.   PT Assessment  PT Assessment Results: Pain, Orthopedic restrictions, Decreased range of motion, Decreased strength     Plan:  OP PT Plan  Treatment/Interventions: Education/ Instruction, Therapeutic exercises, Manual therapy, Neuromuscular re-education  PT Plan: Skilled PT  PT Frequency: 2 times per week  Duration: 6 week  Onset Date: 12/19/24  Certification Period Start Date: 04/15/25  Certification Period End Date: 07/15/25  Number of Treatments Authorized: auth req  Rehab Potential: Good  Plan of Care Agreement: Patient    Current Problem:   1. Cervical spondylosis with myelopathy  Referral to Physical Therapy      2. H/O cervical spine surgery  Referral to Physical Therapy          Subjective    8/10 neck pain , spasms Rt shoulder and neck, left arm radicular  Pt is post op c-spine surgery 12-19-24  Hopes to return to work as     s/p C4-6 cervical laminoplasty on 12/19/24 for spondylotic myelopathy      General:  General  Reason for Referral: s/p C4-6 cervical laminoplasty on 12/19/24 for spondylotic myelopathy. cervical spondylosis with myelopathy  Referred By: TAZ Verma  Past Medical History Relevant to Rehab: s/p C4-6 cervical laminoplasty on 12/19/24 for spondylotic myelopathy.  Precautions:  Precautions  STEADI Fall Risk Score (The score of 4 or more indicates an increased risk of falling): 9 ( fell Feb  2025)  Precautions Comment: post op C4-C6 12-19-24,  hx of falls       Pain:  Pain Assessment  Pain Assessment: 0-10  0-10 (Numeric) Pain Score: 8  Pain Type: Surgical pain  Pain Location: Neck  Pain Orientation: Lower, Mid, Posterior  Pain Radiating Towards: left arm radiates to fingers,  Rt arm not symptomatic as much  Pain Descriptors: Pressure, Tightness, Sore, Spasm, Radiating, Tingling  Pain Frequency: Constant/continuous  Pain Onset: Ongoing              Objective        General Assessments:  Cora Solares is a  45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      She is overall improving in every way including pain and strength.    She has pain spasms in her right shoulder that hurts her when she lifts her right arm.    She still gets muscle spasms in her neck and feels like there is a knot on the right side.  Left shoulder and left arm radicular symptoms to fingers intermittent       She would like to do some physical therapy for her neck and return to work after this improves. Pt thinks about returning to work as a  after 4 to 6 weeks of physical therapy.      no dry needling or estim is covered         Functional Assessments:  Independent with no assist device  Off work as  since surgery  Housework limited light activities  Making bed is needs assist  Doing dishes difficult  Reaching with arms increases pain    Extremity/Trunk Assessments:    Cervical AROM  Side bend 15 degrees ( *) pain marcelo  Rotation Rt 15   left 10  ( *)     Shoulders AROM seated  Flex 125 Rt  left 125  ER WNL   IR WNL    Shoulders 3/5 marcelo  Heavy to lift 90 degrees at shoulder     Rt 40#   Left 35#  Hand  decreased    +TTP post neck and across [place] marcelo   Incision healed       Outcome Measures:   NDI = 23 = 46%    Treatments:  EXERCISES       Date 4-15-25      VISIT# #1 # # #    REPS REPS REPS REPS          Cervical AROM               Cervical isometric              Shoulder AROM to 90  Repeat flexion  Scaption  abd                     Dry needling is not covered       Estim is not covered        Manual / STM  3 min      Posture ed x       squeeze Repeat 2 x ea      Home tens unit Has one at home      HEP            EDUCATION:  Outpatient Education  Individual(s) Educated: Patient  Education Provided: POC, Fall Risk, Posture, Home Safety  Diagnosis and Precautions: post op cervical precautions , fall risk    Goals:  Active       PT Problem       PT Goal        Start:  04/15/25    Expected End:  05/06/25       Patient to be independent with home exercises for cervical and scapular area to increase ROM and stabilization for increased function    Patient to demonstrate awareness of neutral alignment for posture and understand strategies to use for sleeping, standing , sitting and functional activities to reduce symptoms and / or radiculopathy           PT Goal        Start:  04/15/25    Expected End:  05/27/25       Pt pain to reduce 50%     Patient to demonstrate functional reach and to increased strength by 1/3 MMT grade in areas of weakness for increased function and reduced pain    Patient to have NDI of 4 points or more improvement for increased functional mobility

## 2025-04-17 ENCOUNTER — OFFICE VISIT (OUTPATIENT)
Dept: PAIN MEDICINE | Facility: HOSPITAL | Age: 45
End: 2025-04-17
Payer: COMMERCIAL

## 2025-04-17 VITALS
HEART RATE: 85 BPM | DIASTOLIC BLOOD PRESSURE: 89 MMHG | RESPIRATION RATE: 16 BRPM | SYSTOLIC BLOOD PRESSURE: 128 MMHG | WEIGHT: 97 LBS | OXYGEN SATURATION: 97 % | HEIGHT: 57 IN | BODY MASS INDEX: 20.93 KG/M2

## 2025-04-17 DIAGNOSIS — M62.838 MUSCLE SPASM: ICD-10-CM

## 2025-04-17 DIAGNOSIS — M54.12 CERVICAL RADICULOPATHY: ICD-10-CM

## 2025-04-17 DIAGNOSIS — M79.2 NEUROPATHIC PAIN: ICD-10-CM

## 2025-04-17 PROCEDURE — 3008F BODY MASS INDEX DOCD: CPT | Performed by: CLINICAL NURSE SPECIALIST

## 2025-04-17 PROCEDURE — 4004F PT TOBACCO SCREEN RCVD TLK: CPT | Performed by: CLINICAL NURSE SPECIALIST

## 2025-04-17 PROCEDURE — 99214 OFFICE O/P EST MOD 30 MIN: CPT | Performed by: CLINICAL NURSE SPECIALIST

## 2025-04-17 RX ORDER — DULOXETIN HYDROCHLORIDE 30 MG/1
30 CAPSULE, DELAYED RELEASE ORAL DAILY
Qty: 30 CAPSULE | Refills: 5 | Status: SHIPPED | OUTPATIENT
Start: 2025-04-17 | End: 2025-05-17

## 2025-04-17 RX ORDER — IBUPROFEN 800 MG/1
800 TABLET ORAL EVERY 8 HOURS PRN
Qty: 90 TABLET | Refills: 1 | Status: SHIPPED | OUTPATIENT
Start: 2025-04-17

## 2025-04-17 RX ORDER — METHOCARBAMOL 500 MG/1
500 TABLET, FILM COATED ORAL 3 TIMES DAILY PRN
Qty: 90 TABLET | Refills: 1 | Status: SHIPPED | OUTPATIENT
Start: 2025-04-17 | End: 2025-05-17

## 2025-04-17 RX ORDER — IBUPROFEN 800 MG/1
800 TABLET ORAL EVERY 8 HOURS PRN
COMMUNITY
End: 2025-04-17 | Stop reason: DRUGHIGH

## 2025-04-17 ASSESSMENT — ENCOUNTER SYMPTOMS
OCCASIONAL FEELINGS OF UNSTEADINESS: 0
LOSS OF SENSATION IN FEET: 0
DEPRESSION: 0

## 2025-04-17 NOTE — ASSESSMENT & PLAN NOTE
45 year-old female with history of occipital neuralgia responding well to previous greater occipital nerve blocks and cervical radicular symptoms presents for follow-up. Previously did well with greater occipital nerve blocks.  Developed worsening cervical radicular symptoms accompanied by neuropathy in her upper extremities and weakness.  MRI cervical spine consistent with degenerative disc disease and facet arthrosis with moderate spinal canal stenosis at C4-5 and C5-6.  Patient stated that symptoms were unbearable and interfering with quality of her life.  She proceeded with neurosurgery evaluation and is currently status post C3 and C7 laminectomy and C4-C6 laminoplasty on 12/19/2024.  Presenting at today's office visit with continued improvement in postoperative pain.  Continues to experience cervical pain with radiation to bilateral upper extremities left greater than right.  Pain accompanied by numbness and tingling as well as weakness most noted in her left upper extremity.  Significant muscle tightness and myofascial tenderness upper trapezius muscles bilaterally.  She has noted significant improvement in her range of motion.  She does feel that the addition of a TENS unit has been beneficial.  Continued benefit with duloxetine, Robaxin and ibuprofen.  She does feel that the increased dose of duloxetine has been helpful for neuropathic pain. Patient cannot take Celebrex secondary to sulfa allergy.  She did not feel that the addition of NSAID compounding cream was helpful and has discontinued this medication.  We may consider return to gabapentin if she fails to receive relief with the above-noted medications.  We discussed the addition of ketamine if she could not get good pain relief with her current medication regimen.  She has followed up with her psychiatrist with a referral for ketamine to treat depression.  Recommended that the patient follow through with this referral as she may gain additional  benefit for neuropathic pain with ketamine treatments for depression.  She also continues to utilize marijuana products and feels that they remain beneficial.  She followed up with her neurosurgeon with recommendation to proceed with 4 to 6 weeks of physical therapy.  She is scheduled to start her physical therapy next week.  She has not been able to return to work and will discuss return to work with her neurosurgeon after she has completed physical therapy. Plan reviewed with patient at today's visit.    -Continue Robaxin 500 mg up to 3 times per day as needed for muscle tightness and spasm.  Reviewed side effects with patient.  - Continue duloxetine to 90 mg daily for additional neuropathic relief.    -May consider revisiting gabapentin in the future if she fails to receive relief with the above-noted medications.  Previously tolerated gabapentin without adverse effects.  Patient not interested in Lyrica.   -Continue ibuprofen 800 mg which she may take up to 3 times per day as needed for pain. The patient was cautioned about possible side effects and risks of this medication including stomach upset, stomach ulcers, kidney risks and cardiovascular risks to include hypertension and slightly increased risks of stroke and myocardial infarction. Also discussed were ways to minimize these risks by taking this medication with food, staying well-hydrated, watching for any hypertension, and taking the medication with a stomach protectant such as pepcid, zantac, or a proton pump inhibitor.  Reviewed CMP with adequate renal function.  -Recommended patient proceed with her referral for ketamine infusions targeting depression.  Advised patient that she may receive additional benefit with ketamine for neuropathic component of pain.    -Advised patient to continue use of her TENS unit.  -Recommended patient continue to follow closely with psychiatry.    - Patient will proceed with physical therapy as ordered by her  neurosurgeon.  Recommended she follow-up with her neurosurgeon after completing physical therapy to discuss return to work.  - Follow-up in our office in 6 months or sooner if needed.

## 2025-04-17 NOTE — PROGRESS NOTES
Subjective   Patient ID: Cora Solares is a 45 y.o. female who presents for cervical pain    HPI  45-year-old female with history of cervical pain presents for follow-up. Cervical pain accompanied by chronic headaches as well as myofascial component to pain. Previously did well with greater occipital nerve blocks. Patient noted a significant increase in her cervical symptoms accompanied by upper extremity weakness, numbness/tingling. Cervical MRI consistent with degenerative disc disease and facet arthrosis with moderate spinal canal stenosis at C4-5 and C5-6. Patient was sent for neurosurgical evaluation and at that point scheduled for C3 and C7 laminectomy and C4-C6 laminoplasty on 12/19/2024.  Patient initially had difficult recovery secondary to significant pain accompanied by muscle tightness and spasms.  Failed Valium and tizanidine and was rotated to Robaxin for muscle tightness and spasms.  Added duloxetine and increased her dose to 90 mg daily for neuropathic component of pain.  Added ibuprofen for breakthrough pain.  Added NSAID compounding cream for topical pain relief.  Encourage patient to continue use of her TENS unit.  She continues to utilize THC products most often at bedtime.  We have previously discussed the addition of Lyrica and patient wanted to avoid Lyrica as she said her brother took his own life after taking Lyrica.  She previously took gabapentin without adverse effects.  Patient had not started a formal course of physical therapy and was going to contact her neurosurgeon to discuss moving forward with physical therapy.  Previously discussed ketamine infusions if we were unable to control her cervical pain/neuropathic pain.  Patient was previously provided a referral for psychiatric counseling secondary to chronic pain/depression.  Doing well with counseling.  Presenting at today's office visit for routine follow-up.  Continues to experience cervical pain with radiation into bilateral  upper extremities left greater than right.  Numbness and tingling in upper extremities most noted in her left upper extremity.  Weakness left upper extremity.  Pain is aching and throbbing.  Pain accompanied by significant muscle tightness and spasms upper extremities.  She does endorse 1 fall recently when she stood up and lost her balance falling forward.  Denies any injury.  Did not seek medical attention.  She did follow-up with her neurosurgeon and is scheduled to start 4 to 6 weeks of physical therapy.  She does feel that the increased dose of duloxetine has been helpful for mood as well as pain.  She also continues to endorse relief with Robaxin for muscle tightness and spasms using most often at bedtime.  She will utilize her ibuprofen during the day for pain relief.  She did not feel NSAID compounding cream was helpful and stopped using this medication.  Patient states she followed up with psychiatry who suggested that ketamine would be beneficial for her depression.  She is scheduled to proceed with an evaluation for ketamine infusions.    Patient states she has neck pain that radiates into bilateral arms with tremors. Dr. Verma would like her to do 4-6 weeks of PT before going back to work and stated it would be up to us for her return to work permission.     Is interested in Ketamine referral      Pain Score: 7/10     Injection  8/2/24 ONB Left- with moderate relief     Medications: Duloxetine 30 +60 mg, Robaxin 500mg TID-needs refill/Ibuprofen-needs refill  OAR[facility]:  Merle Kc, APRN-CNP, APRN-CNS on 4/17/2025  4:17 PM  I have personally reviewed the OARRS report for Cora Solares. I have considered the risks of abuse, dependence, addiction and diversion        Review of Systems    ROS:   General: No fevers, chills, weight loss  Skin: Negative for lesions  Eyes: No acute vision changes  Ears: No vertigo  Nose, mouth, throat: No difficulty swallowing or speaking  Respiratory: No cough, shortness of  "breath, cyanosis  Cardiovascular: Negative for chest pain syncope or palpitation  Gastrointestinal: No constipation, nausea, vomiting  Neurological: Negative for headache, positive for: Paresthesia and weakness upper extremities  Psychological: Negative for severe or debilitating anxiety, depression. Negative memory loss  Musculoskeletal: Positive for arthralgia, myalgia, pain and muscle tightness  Endocrine: Negative for weight gain, appetite changes, excessive sweating  Allergy/immune: Negative    All 13 systems were reviewed and are within normal levels except as noted or in the history of present illness.  Positive or pertinent negative responses are noted or were in the history of present illness. As noted, the patient denies significant or impairing weakness in the bilateral upper and lower extremities, medication induced constipation, and bowel or bladder incontinence.   Current Medications[1]     Medical History[2]     Surgical History[3]     Family History[4]     RX Allergies[5]     Objective     Visit Vitals  /89   Pulse 85   Resp 16   Ht 1.448 m (4' 9\")   Wt (!) 44 kg (97 lb)   SpO2 97%   BMI 20.99 kg/m²   OB Status Hysterectomy   Smoking Status Every Day   BSA 1.33 m²        Physical Exam    PE:  General: Well-developed, well-nourished, no acute distress. The patient demonstrates no pain behavior, symptom magnification or overt drug-seeking behavior.  Eye: Pupils appropriate for room lighting  Neck/thyroid: No obvious goiter or enlargement of neck noted  Respiratory exam: Normal respiratory effort, unlabored respiration. No accessory muscle use noted  Cardiac exam: Bilateral radial pulses intact  Abdominal: Nondistended  Spine, cervical: Tenderness to paraspinous musculature paracervical region bilaterally.  Myofascial tenderness and muscle tightness upper trapezius muscles bilaterally.  Improvement in flexion and extension.  Improvement in rotational twisting.  Well-healed surgical " incision.  Spine, lumbar: The patient is able to rise from a seated to standing position without hesitancy, push off, or delay. Gait is grossly nonantalgic. Tenderness to paraspinous musculature is noted  Neurologic exam: Muscle strength is antigravity in all 4 extremities.  Equal muscle strength bilateral upper extremities 5/5.  Psychiatric exam: Judgment and insight normal, affect normal, speech is fluent, affect appropriate, demonstrating no signs of hypersomnolence, sedation, or confusion            Assessment/Plan   Problem List Items Addressed This Visit           ICD-10-CM    Cervical radiculopathy M54.12    45 year-old female with history of occipital neuralgia responding well to previous greater occipital nerve blocks and cervical radicular symptoms presents for follow-up. Previously did well with greater occipital nerve blocks.  Developed worsening cervical radicular symptoms accompanied by neuropathy in her upper extremities and weakness.  MRI cervical spine consistent with degenerative disc disease and facet arthrosis with moderate spinal canal stenosis at C4-5 and C5-6.  Patient stated that symptoms were unbearable and interfering with quality of her life.  She proceeded with neurosurgery evaluation and is currently status post C3 and C7 laminectomy and C4-C6 laminoplasty on 12/19/2024.  Presenting at today's office visit with continued improvement in postoperative pain.  Continues to experience cervical pain with radiation to bilateral upper extremities left greater than right.  Pain accompanied by numbness and tingling as well as weakness most noted in her left upper extremity.  Significant muscle tightness and myofascial tenderness upper trapezius muscles bilaterally.  She has noted significant improvement in her range of motion.  She does feel that the addition of a TENS unit has been beneficial.  Continued benefit with duloxetine, Robaxin and ibuprofen.  She does feel that the increased dose of  duloxetine has been helpful for neuropathic pain. Patient cannot take Celebrex secondary to sulfa allergy.  She did not feel that the addition of NSAID compounding cream was helpful and has discontinued this medication.  We may consider return to gabapentin if she fails to receive relief with the above-noted medications.  We discussed the addition of ketamine if she could not get good pain relief with her current medication regimen.  She has followed up with her psychiatrist with a referral for ketamine to treat depression.  Recommended that the patient follow through with this referral as she may gain additional benefit for neuropathic pain with ketamine treatments for depression.  She also continues to utilize marijuana products and feels that they remain beneficial.  She followed up with her neurosurgeon with recommendation to proceed with 4 to 6 weeks of physical therapy.  She is scheduled to start her physical therapy next week.  She has not been able to return to work and will discuss return to work with her neurosurgeon after she has completed physical therapy. Plan reviewed with patient at today's visit.    -Continue Robaxin 500 mg up to 3 times per day as needed for muscle tightness and spasm.  Reviewed side effects with patient.  - Continue duloxetine to 90 mg daily for additional neuropathic relief.    -May consider revisiting gabapentin in the future if she fails to receive relief with the above-noted medications.  Previously tolerated gabapentin without adverse effects.  Patient not interested in Lyrica.   -Continue ibuprofen 800 mg which she may take up to 3 times per day as needed for pain. The patient was cautioned about possible side effects and risks of this medication including stomach upset, stomach ulcers, kidney risks and cardiovascular risks to include hypertension and slightly increased risks of stroke and myocardial infarction. Also discussed were ways to minimize these risks by taking this  medication with food, staying well-hydrated, watching for any hypertension, and taking the medication with a stomach protectant such as pepcid, zantac, or a proton pump inhibitor.  Reviewed CMP with adequate renal function.  -Recommended patient proceed with her referral for ketamine infusions targeting depression.  Advised patient that she may receive additional benefit with ketamine for neuropathic component of pain.    -Advised patient to continue use of her TENS unit.  -Recommended patient continue to follow closely with psychiatry.    - Patient will proceed with physical therapy as ordered by her neurosurgeon.  Recommended she follow-up with her neurosurgeon after completing physical therapy to discuss return to work.  - Follow-up in our office in 6 months or sooner if needed.         Relevant Medications    DULoxetine (Cymbalta) 30 mg DR capsule    ibuprofen 800 mg tablet     Other Visit Diagnoses         Codes      Neuropathic pain     M79.2    Relevant Medications    DULoxetine (Cymbalta) 30 mg DR capsule      Muscle spasm     M62.838    Relevant Medications    methocarbamol (Robaxin) 500 mg tablet                 [1]   Current Outpatient Medications:     albuterol 90 mcg/actuation aerosol powdr breath activated inhaler, Inhale 2 puffs every 6 hours., Disp: , Rfl:     budesonide-formoteroL (Symbicort) 160-4.5 mcg/actuation inhaler, Inhale 2 puffs 2 times a day., Disp: , Rfl:     polyethylene glycol (Glycolax, Miralax) 17 gram/dose powder, Mix 17 g of powder and drink once daily for 10 days, Disp: 238 g, Rfl: 0    varenicline tartrate (Chantix) 0.5 mg tablet, Take 1 daily for 3 days, then take 1 daily two times per day for 1 week, then take 2 tabs in am and 1 in pm for 1 week, then start 2 tabs twice daily, take with full glass of water. Continue 2 tabs daily, Disp: 120 tablet, Rfl: 1    DULoxetine (Cymbalta) 30 mg DR capsule, Take 1 capsule (30 mg) by mouth once daily. Do not crush or chew.  Patient will  take duloxetine 30 mg with duloxetine 60 mg for total of 90 mg daily., Disp: 30 capsule, Rfl: 5    DULoxetine (Cymbalta) 60 mg DR capsule, Take 1 capsule (60 mg) by mouth once daily. Do not crush or chew.  Patient will take duloxetine 60 mg with duloxetine 30 mg for a total of 90 mg daily., Disp: 30 capsule, Rfl: 2    ibuprofen 800 mg tablet, Take 1 tablet (800 mg) by mouth every 8 hours if needed for mild pain (1 - 3). Take with food, Disp: 90 tablet, Rfl: 1    methocarbamol (Robaxin) 500 mg tablet, Take 1 tablet (500 mg) by mouth 3 times a day as needed for muscle spasms., Disp: 90 tablet, Rfl: 1  [2]   Past Medical History:  Diagnosis Date    Acute laryngitis 03/08/2016    Acute laryngitis    Asthma     Cervical disc disease     Cutaneous abscess of chest wall 05/28/2014    Chest wall abscess    Cutaneous abscess of left lower limb 10/18/2016    Abscess of left thigh    Cutaneous abscess of right lower limb 08/14/2014    Abscess of right thigh    Depression     Headache 12/2021    Low back pain     Neck pain     Noninfective gastroenteritis and colitis, unspecified 10/21/2014    Acute gastroenteritis    Other fecal abnormalities 02/02/2016    Loose stools    Personal history of other diseases of the respiratory system 09/26/2016    History of acute sinusitis    Personal history of other diseases of the respiratory system 01/13/2015    History of influenza    Personal history of other infectious and parasitic diseases 11/30/2015    History of herpes labialis    Personal history of other specified conditions 04/11/2016    History of nausea    Personal history of other specified conditions 11/30/2015    History of dysuria    Personal history of urinary (tract) infections 12/31/2014    History of urinary tract infection    Spinal stenosis     Unspecified abdominal pain 02/26/2016    Abdominal cramping    Unspecified otitis externa, right ear 06/18/2014    Otitis externa of right ear   [3]   Past Surgical  History:  Procedure Laterality Date    CERVICAL FUSION      COLONOSCOPY      removed polyp    HYSTERECTOMY  05/19/2012    Hysterectomy    LAMINECTOMY  12/19/2024    MANDIBLE SURGERY      2023 mass removed    POSTERIOR CERVICAL LAMINECTOMY     [4]   Family History  Problem Relation Name Age of Onset    Hypertension Mother Mom     Diabetes Mother Mom     Hypertension Father Dad     Breast cancer Father's Sister      Breast cancer Father's Sister      Breast cancer Father's Sister      Breast cancer Father's Sister      Breast cancer Father's Sister     [5]   Allergies  Allergen Reactions    Magnesium Citrate Other     mouth sores    Penicillins Unknown    Sulfa (Sulfonamide Antibiotics) Unknown    Sulfamethoxazole-Trimethoprim Other

## 2025-04-21 ENCOUNTER — TELEPHONE (OUTPATIENT)
Dept: PRIMARY CARE | Facility: CLINIC | Age: 45
End: 2025-04-21
Payer: COMMERCIAL

## 2025-04-21 DIAGNOSIS — R73.9 HYPERGLYCEMIA: Primary | ICD-10-CM

## 2025-04-21 NOTE — TELEPHONE ENCOUNTER
Patient would like a call back about her blood tests.  She stated that some came back abnormal.  Call her at [phone] to discuss.

## 2025-04-22 ENCOUNTER — TREATMENT (OUTPATIENT)
Dept: PHYSICAL THERAPY | Facility: HOSPITAL | Age: 45
End: 2025-04-22
Payer: COMMERCIAL

## 2025-04-22 DIAGNOSIS — Z98.890 H/O CERVICAL SPINE SURGERY: ICD-10-CM

## 2025-04-22 DIAGNOSIS — M47.12 CERVICAL SPONDYLOSIS WITH MYELOPATHY: Primary | ICD-10-CM

## 2025-04-22 PROCEDURE — 97110 THERAPEUTIC EXERCISES: CPT | Mod: GP,CQ

## 2025-04-22 PROCEDURE — 97140 MANUAL THERAPY 1/> REGIONS: CPT | Mod: GP,CQ

## 2025-04-22 ASSESSMENT — PAIN DESCRIPTION - DESCRIPTORS: DESCRIPTORS: PRESSURE;TIGHTNESS;SORE

## 2025-04-22 ASSESSMENT — PAIN - FUNCTIONAL ASSESSMENT: PAIN_FUNCTIONAL_ASSESSMENT: 0-10

## 2025-04-22 ASSESSMENT — PAIN SCALES - GENERAL: PAINLEVEL_OUTOF10: 8

## 2025-04-22 NOTE — PROGRESS NOTES
"Physical Therapy    Physical Therapy Treatment      Patient Name: Cora Solares  MRN: [id number]  Today's Date: 4/22/2025  Visit #1 auth required      Time Entry:   Time Calculation  Start Time: 0335  Stop Time: 0402  Time Calculation (min): 27 min     PT Therapeutic Procedures Time Entry  Manual Therapy Time Entry: 15  Therapeutic Exercise Time Entry: 12                   Assessment:    Pt able to complete isometrics but had some spasms in right neck with posterior isometrics. Pt noted tightness significantly better at end of session, noted 7/10 pain level.         Plan:   Issue HEP    Current Problem:   1. Cervical spondylosis with myelopathy  Follow Up In Physical Therapy      2. H/O cervical spine surgery            Subjective    Pt reported 8/10 pain is constant post surgery. Pt noted tightness and knots in neck and shoulders and would like to relieve tension.      General:     Precautions:  Precautions  STEADI Fall Risk Score (The score of 4 or more indicates an increased risk of falling): 9 ( fell Feb 2025)  Precautions Comment: post op C4-C6 12-19-24,  hx of falls       Pain:  Pain Assessment  Pain Assessment: 0-10  0-10 (Numeric) Pain Score: 8  Pain Type: Surgical pain  Pain Location: Neck  Pain Orientation: Lower, Mid, Posterior  Pain Descriptors: Pressure, Tightness, Sore  Pain Frequency: Constant/continuous              Objective   Pt. 16 minutes late to appt.   Initiated therapy    Treatments:    EXERCISES       Date 4-15-25 4/22/25     VISIT# #1 #2 # #    REPS REPS REPS REPS          Cervical AROM              Cervical isometric  10x5\" ea            Upper traps stretch  15\"x3     Levator stretch  15\"x3            Shoulder AROM to 90  Repeat flexion  Scaption  abd                     Dry needling is not covered       Estim is not covered        Manual / STM  3 min 15 min     Posture ed x       squeeze Repeat 2 x ea      Home tens unit Has one at home      HEP            EDUCATION:   "     Goals:  Active       PT Problem       PT Goal        Start:  04/15/25    Expected End:  05/06/25       Patient to be independent with home exercises for cervical and scapular area to increase ROM and stabilization for increased function    Patient to demonstrate awareness of neutral alignment for posture and understand strategies to use for sleeping, standing , sitting and functional activities to reduce symptoms and / or radiculopathy           PT Goal        Start:  04/15/25    Expected End:  05/27/25       Pt pain to reduce 50%     Patient to demonstrate functional reach and to increased strength by 1/3 MMT grade in areas of weakness for increased function and reduced pain    Patient to have NDI of 4 points or more improvement for increased functional mobility

## 2025-04-24 ENCOUNTER — DOCUMENTATION (OUTPATIENT)
Dept: PHYSICAL THERAPY | Facility: HOSPITAL | Age: 45
End: 2025-04-24
Payer: COMMERCIAL

## 2025-04-24 ENCOUNTER — APPOINTMENT (OUTPATIENT)
Dept: PHYSICAL THERAPY | Facility: HOSPITAL | Age: 45
End: 2025-04-24
Payer: COMMERCIAL

## 2025-04-24 NOTE — PROGRESS NOTES
Physical Therapy                 Therapy Communication Note    Patient Name: Cora Solares  MRN: [id number]  : 1980  Today's Date: 2025     Discipline: Physical Therapy    Missed Time: Cancel    Missed Visit Reason:  not felling well

## 2025-04-29 ENCOUNTER — APPOINTMENT (OUTPATIENT)
Dept: PAIN MEDICINE | Facility: HOSPITAL | Age: 45
End: 2025-04-29
Payer: COMMERCIAL

## 2025-04-29 ENCOUNTER — TREATMENT (OUTPATIENT)
Dept: PHYSICAL THERAPY | Facility: HOSPITAL | Age: 45
End: 2025-04-29
Payer: COMMERCIAL

## 2025-04-29 DIAGNOSIS — M47.12 CERVICAL SPONDYLOSIS WITH MYELOPATHY: ICD-10-CM

## 2025-04-29 PROCEDURE — 97110 THERAPEUTIC EXERCISES: CPT | Mod: GP,CQ

## 2025-04-29 PROCEDURE — 97140 MANUAL THERAPY 1/> REGIONS: CPT | Mod: GP,CQ

## 2025-04-29 NOTE — PROGRESS NOTES
"Physical Therapy    Physical Therapy Treatment    Patient Name: Cora Solares  MRN: [id number]  : 1980  Today's Date: 2025  Time Calculation  Start Time: 355  Stop Time: 433  Time Calculation (min): 38 min    PT Therapeutic Procedures Time Entry  Manual Therapy Time Entry: 15  Therapeutic Exercise Time Entry:           VISIT:# 3    Current Problem  Problem List Items Addressed This Visit           ICD-10-CM    Cervical spondylosis with myelopathy M47.12        Subjective   Reason for Referral: s/p C4-6 cervical laminoplasty on 24 for spondylotic myelopathy. cervical spondylosis with myelopathy  Referred By: TAZ Verma  Past Medical History Relevant to Rehab: s/p C4-6 cervical laminoplasty on 24 for spondylotic myelopathy.   Pt states she felt ok after last visit.   Pain   5/10       Objective       Added exercises           Precautions  Precautions  Medical Precautions: Fall precautions  Post-Surgical Precautions:  (Cervical spinal precautions)  Precautions Comment: post op C4-C6 24,  hx of falls      Treatments:        EXERCISES       Date 4-15-25 4/22/25 4/29/25    VISIT# #1 #2 #3 #    REPS REPS REPS REPS          Cervical AROM              Cervical isometric  10x5\" ea 10x5\" ea           Upper traps stretch  15\"x3 15\"x3    Levator stretch  15\"x3 15\"x3           Shoulder AROM to 90  Repeat flexion  Scaption  abd     2x10  2x10  2x10                  Dry needling is not covered       Estim is not covered        Manual / STM  3 min 15 min 15'    Posture ed x  Edu pt     squeeze Repeat 2 x ea      Home tens unit Has one at home      HEP             Assessment:  Pt reports cervical muscles feel very tight. Edu pt on Posture  and being aware of posture with stretches. After session pt states she feels looser in her neck. Pt states she is using e-stim at home and will try HP to help loosen muscles.     Plan:   OP PT Plan  Treatment/Interventions: Education/ Instruction, Therapeutic " exercises, Manual therapy, Neuromuscular re-education  PT Plan: Skilled PT  PT Frequency: 2 times per week  Duration: 6 week  Onset Date: 12/19/24  Certification Period Start Date: 04/15/25  Certification Period End Date: 07/15/25  Number of Treatments Authorized: chacorta hudson  Rehab Potential: Good  Plan of Care Agreement: Patient    Goals:  Active       PT Problem       PT Goal        Start:  04/15/25    Expected End:  05/06/25       Patient to be independent with home exercises for cervical and scapular area to increase ROM and stabilization for increased function    Patient to demonstrate awareness of neutral alignment for posture and understand strategies to use for sleeping, standing , sitting and functional activities to reduce symptoms and / or radiculopathy           PT Goal        Start:  04/15/25    Expected End:  05/27/25       Pt pain to reduce 50%     Patient to demonstrate functional reach and to increased strength by 1/3 MMT grade in areas of weakness for increased function and reduced pain    Patient to have NDI of 4 points or more improvement for increased functional mobility

## 2025-05-01 ENCOUNTER — APPOINTMENT (OUTPATIENT)
Dept: PHYSICAL THERAPY | Facility: HOSPITAL | Age: 45
End: 2025-05-01
Payer: COMMERCIAL

## 2025-05-01 ENCOUNTER — APPOINTMENT (OUTPATIENT)
Dept: NEUROSURGERY | Facility: CLINIC | Age: 45
End: 2025-05-01
Payer: COMMERCIAL

## 2025-05-01 ENCOUNTER — DOCUMENTATION (OUTPATIENT)
Dept: PHYSICAL THERAPY | Facility: HOSPITAL | Age: 45
End: 2025-05-01

## 2025-05-01 NOTE — PROGRESS NOTES
Physical Therapy                 Therapy Communication Note    Patient Name: Cora Solares  MRN: [id number]  Department: [address]  Room: Room/bed info not found  Today's Date: 5/1/2025     Discipline: Physical Therapy          Missed Visit Reason:      Missed Time: Cancel    Comment: via mychart

## 2025-05-06 ENCOUNTER — APPOINTMENT (OUTPATIENT)
Dept: PHYSICAL THERAPY | Facility: HOSPITAL | Age: 45
End: 2025-05-06
Payer: COMMERCIAL

## 2025-05-08 ENCOUNTER — APPOINTMENT (OUTPATIENT)
Dept: PHYSICAL THERAPY | Facility: HOSPITAL | Age: 45
End: 2025-05-08
Payer: COMMERCIAL

## 2025-05-10 ENCOUNTER — OFFICE VISIT (OUTPATIENT)
Dept: SURGICAL ONCOLOGY | Facility: CLINIC | Age: 45
End: 2025-05-10
Payer: COMMERCIAL

## 2025-05-10 VITALS
WEIGHT: 97.11 LBS | BODY MASS INDEX: 21.01 KG/M2 | HEART RATE: 94 BPM | SYSTOLIC BLOOD PRESSURE: 143 MMHG | DIASTOLIC BLOOD PRESSURE: 93 MMHG | OXYGEN SATURATION: 98 % | TEMPERATURE: 97.9 F

## 2025-05-10 DIAGNOSIS — R92.8 ABNORMAL FINDING ON BREAST IMAGING: Primary | ICD-10-CM

## 2025-05-10 DIAGNOSIS — R92.343 EXTREMELY DENSE TISSUE OF BOTH BREASTS ON MAMMOGRAPHY: ICD-10-CM

## 2025-05-10 DIAGNOSIS — R92.1 BREAST CALCIFICATIONS ON MAMMOGRAM: ICD-10-CM

## 2025-05-10 DIAGNOSIS — Z80.3 FAMILY HISTORY OF BREAST CANCER: ICD-10-CM

## 2025-05-10 LAB
EST. AVERAGE GLUCOSE BLD GHB EST-MCNC: 111 MG/DL
EST. AVERAGE GLUCOSE BLD GHB EST-SCNC: 6.2 MMOL/L
HBA1C MFR BLD: 5.5 %

## 2025-05-10 PROCEDURE — 99214 OFFICE O/P EST MOD 30 MIN: CPT | Performed by: NURSE PRACTITIONER

## 2025-05-10 PROCEDURE — 99204 OFFICE O/P NEW MOD 45 MIN: CPT | Performed by: NURSE PRACTITIONER

## 2025-05-10 ASSESSMENT — ENCOUNTER SYMPTOMS
HEADACHES: 1
DEPRESSION: 1
NECK PAIN: 1
NUMBNESS: 1
CONSTITUTIONAL NEGATIVE: 1
EYES NEGATIVE: 1
ADENOPATHY: 1
ARTHRALGIAS: 1
ENDOCRINE NEGATIVE: 1
NERVOUS/ANXIOUS: 1
GASTROINTESTINAL NEGATIVE: 1
SLEEP DISTURBANCE: 1
SHORTNESS OF BREATH: 1
NECK STIFFNESS: 1
MYALGIAS: 1
CARDIOVASCULAR NEGATIVE: 1

## 2025-05-10 ASSESSMENT — PAIN SCALES - GENERAL: PAINLEVEL_OUTOF10: 0-NO PAIN

## 2025-05-10 NOTE — PROGRESS NOTES
[facility], [place], operated by [facility]  Cora Solares female   1980 45 y.o.   [id number]      Chief Complaint  New patient, high risk evaluation.    History Of Present Illness  Cora Solares is a very pleasant 45 y.o.  female presenting to the breast center for a high risk evaluation. She denies breast surgery or biopsy. She has a strong family history of breast cancer, see below. She is also being followed for probably benign bilateral breast calcifications.    BREAST IMAGIN2025 Bilateral diagnostic mammogram, indicates BI-RADS Category 3. Both breasts have probably benign groupings of calcifications similar to the prior mammograms. Recommend bilateral repeat diagnostic mammography in 6 months.    REPRODUCTIVE HISTORY: menarche age 14, , first birth age 16, did not breastfeed, no OCP's, perimenopausal s/p partial hysterectomy with intact ovaries d/t endometriosis and dysplasia in , extremely dense tissue                                 FAMILY CANCER HISTORY:   Paternal Aunt (1): Breast cancer, age 35  Paternal Aunt (2): Breast cancer, age 40  Paternal Aunt (3): Breast cancer, age 45  Paternal Aunt (4): Breast cancer, age 55  Paternal Aunt (5): Breast cancer, unknown age  Maternal Aunt: Breast cancer, unknown age    Review of Systems  Review of Systems   Constitutional: Negative.    HENT:  Negative.     Eyes: Negative.    Respiratory:  Positive for shortness of breath.    Cardiovascular: Negative.    Gastrointestinal: Negative.    Endocrine: Negative.    Genitourinary: Negative.     Musculoskeletal:  Positive for arthralgias, myalgias, neck pain and neck stiffness.   Skin: Negative.    Neurological:  Positive for headaches and numbness.   Hematological:  Positive for adenopathy.   Psychiatric/Behavioral:  Positive for depression and sleep disturbance. The patient is nervous/anxious.         Past Medical History  She has a past medical history of Acute laryngitis (2016), Asthma,  Cervical disc disease, Cutaneous abscess of chest wall (05/28/2014), Cutaneous abscess of left lower limb (10/18/2016), Cutaneous abscess of right lower limb (08/14/2014), Depression, Headache (12/2021), Low back pain, Neck pain, Noninfective gastroenteritis and colitis, unspecified (10/21/2014), Other fecal abnormalities (02/02/2016), Personal history of other diseases of the respiratory system (09/26/2016), Personal history of other diseases of the respiratory system (01/13/2015), Personal history of other infectious and parasitic diseases (11/30/2015), Personal history of other specified conditions (04/11/2016), Personal history of other specified conditions (11/30/2015), Personal history of urinary (tract) infections (12/31/2014), Spinal stenosis, Unspecified abdominal pain (02/26/2016), and Unspecified otitis externa, right ear (06/18/2014).    Surgical History  She has a past surgical history that includes Hysterectomy (05/19/2012); Colonoscopy; Mandible surgery; Laminectomy (12/19/2024); Cervical fusion; and Posterior cervical laminectomy.     Family History  Cancer-related family history includes Breast cancer in her father's sister, father's sister, father's sister, father's sister, and father's sister.     Social History  She reports that she has been smoking cigarettes. She has a 15 pack-year smoking history. She has never used smokeless tobacco. She reports that she does not currently use alcohol. She reports current drug use. Drug: Marijuana.    Allergies  Magnesium citrate, Penicillins, Sulfa (sulfonamide antibiotics), and Sulfamethoxazole-trimethoprim    Medications  Current Outpatient Medications   Medication Instructions    albuterol 90 mcg/actuation aerosol powdr breath activated inhaler 2 puffs, Every 6 hours    budesonide-formoteroL (Symbicort) 160-4.5 mcg/actuation inhaler 2 puffs, 2 times daily    DULoxetine (CYMBALTA) 60 mg, oral, Daily, Do not crush or chew.  Patient will take duloxetine 60  mg with duloxetine 30 mg for a total of 90 mg daily.    DULoxetine (CYMBALTA) 30 mg, oral, Daily, Do not crush or chew.  Patient will take duloxetine 30 mg with duloxetine 60 mg for total of 90 mg daily.    ibuprofen 800 mg, oral, Every 8 hours PRN, Take with food    methocarbamol (ROBAXIN) 500 mg, oral, 3 times daily PRN    polyethylene glycol (Glycolax, Miralax) 17 gram/dose powder Mix 17 g of powder and drink once daily for 10 days    varenicline tartrate (Chantix) 0.5 mg tablet Take 1 daily for 3 days, then take 1 daily two times per day for 1 week, then take 2 tabs in am and 1 in pm for 1 week, then start 2 tabs twice daily, take with full glass of water. Continue 2 tabs daily       Last Recorded Vitals  Vitals:    05/10/25 1058   BP: (!) 143/93   Pulse: 94   Temp: 36.6 °C (97.9 °F)   SpO2: 98%       Physical Exam  Patient is alert and oriented x3 and in a relaxed and appropriate mood. Her gait is steady and hand grasps are equal. Sclera is clear. The breasts are nearly symmetrical. The tissue is dense without palpable abnormalities, discrete nodules or masses. The skin and nipples appear normal. There is no cervical, supraclavicular or axillary lymphadenopathy.        Relevant Results and Imaging  BI mammo bilateral diagnostic 02/27/2025    Narrative  Interpreted By:  Thalia Keith,  STUDY:  BI MAMMO BILATERAL DIAGNOSTIC;  2/27/2025 2:43 pm    ACCESSION NUMBER(S):  [id number]    ORDERING CLINICIAN:  THALIA SANCHEZ    INDICATION:  Signs/Symptoms:see dx.    ,R92.8 Other abnormal and inconclusive findings on diagnostic imaging  of breast    COMPARISON:  10/15/2024 and 09/30/2024    FINDINGS:  2D and tomosynthesis images were reviewed at 1 mm slice thickness.    Density:  The breasts are extremely dense, which lowers the  sensitivity of mammography.    The calcifications in the upper outer quadrant of the breast  bilaterally are similar to the prior mammograms with several of the  calcifications layering on  the MLO views.    Otherwise no suspicious masses or calcifications are identified.    This study was interpreted with CAD. Markers: [place]- skin lesion;  triangle- palpable abnormality    Impression  Both breasts have probably benign groupings of calcifications similar  to the prior mammograms. Recommend bilateral repeat diagnostic  mammography in 6 months.    BI-RADS CATEGORY:      BI-RADS Category:  3 Probably Benign.  Recommendation:  Short-term Interval Follow-up Imaging.  Recommended Date:  6 Months.  Laterality:  Bilateral.    For any future breast imaging appointments, please call [phone]  ([phone]).      MACRO:  None    Signed by: Enoc Keith 2/27/2025 4:18 PM  Dictation workstation:   [id number]      I explained the results in depth, along with suggested explanation for follow up recommendations based on the testing results. BI-RADS Category 3        Risk Models            Orders  Orders Placed This Encounter   Procedures    BI mammo bilateral diagnostic tomosynthesis     Due June 13 2024     Standing Status:   Future     Expected Date:   8/28/2025     Expiration Date:   6/10/2026     Reason for exam::   6 month follow up bilateral calcifications     Radiologist to Determine Optimal Study:   Yes     Release result to [employer]:   Immediate [1]     Is this exam part of a Research Study? If Yes, link this order to the research study:   No     Is the patient pregnant?:   No    Referral to Genetics     Standing Status:   Future     Expected Date:   5/10/2025     Expiration Date:   5/10/2026     Referral Priority:   Routine     Referral Type:   Consultation     Referral Reason:   Consult and Treat     Requested Specialty:   Genetics     Number of Visits Requested:   1       Visit Diagnosis  1. Abnormal finding on breast imaging  BI mammo bilateral diagnostic tomosynthesis      2. Breast calcifications on mammogram  Referral to High Risk Breast Cancer    BI mammo bilateral diagnostic tomosynthesis      3.  Family history of breast cancer  Referral to Genetics      4. Extremely dense tissue of both breasts on mammography            Assessment  Normal clinical exam, stable bilateral breast calcifications, family history breast cancer, extremely dense tissue    Plan  Bilateral diagnostic mammogram August 2025. Genetics referral given. Endocrine therapy not strongly recommended at this time. Will wait on MRI for now since mammogram is August.    Patient Discussion/Summary    High risk breast surveillance care plan:  Yearly mammogram with digital breast tomosynthesis  Twice yearly clinical breast examinations  Breast MRI (to schedule call [phone])  Monthly self breast examinations &/or regular self breast awareness  Vitamin D3 2000 IU/daily (over the counter) unless your PCP recommends you take a specific dose  Exercise 3-4 times per week for 45-60 minutes  Limit alcohol to 3-4 drinks per week if you are menopausal  Eat healthy low-fat diet with lots of vegetable & fruits      IMPORTANT INFORMATION REGARDING YOUR RESULTS    You can see your health information, review clinical summaries from office visits & test results online when you follow your health with MY  Chart, a personal health record. To sign up go to [email or web address]. If you need assistance with signing up or trouble getting into your account call [employer] Patient Line 24/7 at [phone].    My office phone number is [phone] if you need to get in touch with me or have additional questions or concerns. Thank you for choosing [facility] and trusting me as your healthcare provider. I look forward to seeing you again at your next office visit. I am honored to be a provider on your health care team and I remain dedicated to helping you achieve your health goals.    Katelynn Oneal, APRN-CNP

## 2025-05-13 ENCOUNTER — APPOINTMENT (OUTPATIENT)
Dept: PHYSICAL THERAPY | Facility: HOSPITAL | Age: 45
End: 2025-05-13
Payer: COMMERCIAL

## 2025-05-13 ENCOUNTER — DOCUMENTATION (OUTPATIENT)
Dept: PHYSICAL THERAPY | Facility: HOSPITAL | Age: 45
End: 2025-05-13
Payer: COMMERCIAL

## 2025-05-13 NOTE — PROGRESS NOTES
Physical Therapy                 Therapy Communication Note    Patient Name: Cora Solares  MRN: [id number]  : 1980  Today's Date: 2025     Discipline: Physical Therapy    Missed Time: Cancel    Missed Visit Reason:   Appointment canceled by patient. She could not make it today.

## 2025-05-15 ENCOUNTER — DOCUMENTATION (OUTPATIENT)
Dept: PHYSICAL THERAPY | Facility: HOSPITAL | Age: 45
End: 2025-05-15
Payer: COMMERCIAL

## 2025-05-15 DIAGNOSIS — M48.02 CERVICAL STENOSIS OF SPINAL CANAL: Primary | ICD-10-CM

## 2025-05-15 NOTE — PROGRESS NOTES
Physical Therapy                 Therapy Communication Note    Patient Name: Cora Solares  MRN: [id number]  : 1980  Today's Date: 5/15/2025     Discipline: Physical Therapy    Missed Time: No Show

## 2025-05-15 NOTE — TELEPHONE ENCOUNTER
"Per pt's request to restart gabapenin and your note with instructions for her to titrate up on gabapentin.  Per instructions, \"Okay to restart gabapentin at 300 mg 3 times daily. Please provide titration instructions starting with 300 mg for 5 days, increasing to 300 mg twice daily for 5 days and then increasing to 300 mg 3 times daily if tolerated. \"  Pended Rx to you for transmission to [facility] Hyde for gabapentin 300 mg po TID.  Reviewed these instructions with the patient and sent a note to her via [employer] with the written instructions.  Olena Sarmiento RN  "

## 2025-05-16 RX ORDER — GABAPENTIN 300 MG/1
300 CAPSULE ORAL 3 TIMES DAILY
Qty: 90 CAPSULE | Refills: 5 | Status: SHIPPED | OUTPATIENT
Start: 2025-05-16

## 2025-05-20 ENCOUNTER — DOCUMENTATION (OUTPATIENT)
Dept: PHYSICAL THERAPY | Facility: HOSPITAL | Age: 45
End: 2025-05-20

## 2025-05-20 ENCOUNTER — APPOINTMENT (OUTPATIENT)
Dept: PHYSICAL THERAPY | Facility: HOSPITAL | Age: 45
End: 2025-05-20
Payer: COMMERCIAL

## 2025-05-20 NOTE — PROGRESS NOTES
Physical Therapy                 Therapy Communication Note    Patient Name: Cora Solares  MRN: [id number]  Department:   Room: Room/bed info not found  Today's Date: 5/20/2025     Discipline: Physical Therapy    Missed Visit:       Missed Visit Reason:      Missed Time: Cancel    Comment:

## 2025-05-27 ENCOUNTER — APPOINTMENT (OUTPATIENT)
Dept: PHYSICAL THERAPY | Facility: HOSPITAL | Age: 45
End: 2025-05-27
Payer: COMMERCIAL

## 2025-05-27 DIAGNOSIS — M47.12 CERVICAL SPONDYLOSIS WITH MYELOPATHY: Primary | ICD-10-CM

## 2025-05-27 DIAGNOSIS — Z98.890 H/O CERVICAL SPINE SURGERY: ICD-10-CM

## 2025-05-27 PROCEDURE — 97110 THERAPEUTIC EXERCISES: CPT | Mod: GP | Performed by: PHYSICAL THERAPIST

## 2025-05-27 PROCEDURE — 97140 MANUAL THERAPY 1/> REGIONS: CPT | Mod: GP | Performed by: PHYSICAL THERAPIST

## 2025-05-27 ASSESSMENT — PAIN DESCRIPTION - DESCRIPTORS: DESCRIPTORS: SPASM

## 2025-05-27 ASSESSMENT — PAIN SCALES - GENERAL: PAINLEVEL_OUTOF10: 8

## 2025-05-27 ASSESSMENT — PAIN - FUNCTIONAL ASSESSMENT: PAIN_FUNCTIONAL_ASSESSMENT: 0-10

## 2025-05-27 NOTE — PROGRESS NOTES
Physical Therapy    Physical Therapy Treatment      Patient Name: Cora Solares  MRN: [id number]  : 1980   Today's Date: 2025  Visit # 4/  13v approved 4/15/2025-2025   Time Calculation  Start Time: 315  Stop Time: 0400  Time Calculation (min): 45 min    PT Therapeutic Procedures Time Entry  Manual Therapy Time Entry: 15  Therapeutic Exercise Time Entry: 30             Auth:     Current Problem  Problem List Items Addressed This Visit           ICD-10-CM    Cervical spondylosis with myelopathy - Primary M47.12     Other Visit Diagnoses         Codes      H/O cervical spine surgery     Z98.890             Subjective    Pt had not shown for some  therapy visits, she states she was losing track of time.  She returned to work last week / 5.5 hours , and today was first 10 hour days.  8/10 Rt neck muscle spasms, and pt has  across shoulders tension  No falls  Pt sees Pain management , will be having infusions start this Friday     Reason for Referral: s/p C4-6 cervical laminoplasty on 24 for spondylotic myelopathy. cervical spondylosis with myelopathy  Referred By: TAZ Verma  Past Medical History Relevant to Rehab: s/p C4-6 cervical laminoplasty on 24 for spondylotic myelopathy.      Precautions  Precautions  Medical Precautions: Fall precautions  Post-Surgical Precautions:  (Cervical spinal precautions)  Precautions Comment: post op C4-C6 24,  hx of falls    Pain:  Pain Assessment  Pain Assessment: 0-10  0-10 (Numeric) Pain Score: 8  Pain Type: Chronic pain, Surgical pain  Pain Location: Neck  Pain Orientation: Right, Lower, Posterior  Pain Radiating Towards: no longer down arm.  Pain Descriptors: Spasm  Pain Onset: Ongoing      Objective         Pt reports cervical muscles feel very tight,   Edu pt on self massage .   Edu pt on Posture and being aware of posture with stretches.   After session pt states she feels looser in her neck.   Pt states she is using e-stim at home and will try  HP to help loosen muscles.     s/p C4-6 cervical laminoplasty on 24 for spondylotic myelopathy. Experiencing muscle spasms.     no longer having left UE radicular symptoms.     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      She is overall improving in every way including pain and strength.    She has pain spasms in her right shoulder that hurts her when she lifts her right arm.    She still gets muscle spasms in her neck and feels like there is a knot on the right side.  Left shoulder and left arm radicular symptoms to fingers intermittent        She would like to do some physical therapy for her neck and return to work after this improves. Pt thinks about returning to work as a  after 4 to 6 weeks of physical therapy.       no dry needling or estim is covered          Functional Assessments:  Independent with no assist device  Off work as  since surgery  Housework limited light activities       Extremity/Trunk Assessments:     Name and  confirmed  Pt has not been to PT for a month, she no show/ forgot about schedule  Would like to do 1 visit at a time as she has returned to work and has transport issues.  Scheduled for injections later this week with pain management     Cervical AROM  Side bend  20 Rt   30 left  ( increased from 15 degrees )  Rotation Rt  28  ( increased from 15  )  left  35  ( increased from 10 )       Shoulders AROM seated  Flex 125 Rt  left 130  ER WNL   IR WNL     Shoulders 4- 5 marcelo ( increased from 3/5)  Heavy to lift 90 degrees at shoulder      Rt 40#   Left 35#    Headaches moderate, frequent     +TTP post neck and across [place] marcelo   Incision healed     Home TENS unit   Goes to pain management        Outcome Measures:   NDI = 25 = 50%      Treatments:  EXERCISES    recheck   Date 25   VISIT# #3 #4     REPS REPS          "  Cervical AROM               Cervical isometric 10x5\" ea  10x5\" ea           Upper traps stretch 15\"x3  15\"x3   Levator stretch 15\"x3  15\"x3           Shoulder AROM to 90  Repeat flexion  Scaption  abd    2x10  2x10  2x10    2x10  2x10  2x10                   Dry needling is not covered       Estim is not covered        Manual / STM  15'  15'   Posture ed Edu pt  yes    squeeze       Home tens unit    has home TENS   HEP     [email or web address]   Access Code: [email or web address]  URL: [email or web address]    Exercises  - Seated Cervical Sidebending AROM  - 1 x daily - 7 x weekly - 3 sets - 10 hold  - Seated Cervical Rotation AROM  - 1 x daily - 7 x weekly - 3 sets - 10 hold  - Seated Scapular Retraction  - 1 x daily - 7 x weekly - 3 sets - 10 reps  - Seated Isometric Cervical Sidebending  - 1 x daily - 7 x weekly - 10 reps  - Seated Isometric Cervical Flexion  - 1 x daily - 7 x weekly - 10 reps - 5 hold  - Seated Isometric Cervical Sidebending  - 1 x daily - 7 x weekly - 10 reps - 5 hold  - Standing Shoulder Flexion to 90 Degrees  - 1 x daily - 7 x weekly - 3 sets - 10 reps  - Standing Shoulder Scaption  - 1 x daily - 7 x weekly - 3 sets - 10 reps  - Shoulder Abduction - Thumbs Up  - 1 x daily - 7 x weekly - 3 sets - 10 reps    Outpatient Education  Diagnosis and Precautions: post op cervical precautions , fall risk    Assessment: instructed in self massage and relaxation,  home exercises stretches and isometrics were provided.      Plan: continue PT , pt prefers 1 x a week PT and to schedule 1 visit at a time as she is returned to work and transportation is issue.      Goals:  Active       PT Problem       PT Goal  (Progressing)       Start:  04/15/25    Expected End:  06/10/25       Patient to be independent with home exercises for cervical and scapular area to increase ROM and stabilization for increased function. progressing    Patient to demonstrate awareness of neutral alignment for posture and " understand strategies to use for sleeping, standing , sitting and functional activities to reduce symptoms and / or radiculopathy.  progressing           PT Goal        Start:  04/15/25    Expected End:  07/01/25       Pt pain to reduce 50% . Not met    Patient to demonstrate functional reach and to increased strength by 1/3 MMT grade in areas of weakness for increased function and reduced pain. progressing    Patient to have NDI of 4 points or more improvement for increased functional mobility  . Not met

## 2025-06-06 ENCOUNTER — TELEPHONE (OUTPATIENT)
Dept: PAIN MEDICINE | Facility: HOSPITAL | Age: 45
End: 2025-06-06
Payer: COMMERCIAL

## 2025-06-06 DIAGNOSIS — M54.12 CERVICAL RADICULOPATHY: Primary | ICD-10-CM

## 2025-06-06 DIAGNOSIS — M79.2 NEUROPATHIC PAIN: ICD-10-CM

## 2025-06-06 RX ORDER — GABAPENTIN 600 MG/1
600 TABLET ORAL 3 TIMES DAILY
Qty: 90 TABLET | Refills: 2 | Status: SHIPPED | OUTPATIENT
Start: 2025-06-06 | End: 2025-07-06

## 2025-06-06 NOTE — TELEPHONE ENCOUNTER
Spoke with patient and she misunderstood she thought she was to take 2 capsules TID. She is taking 600mg TID and tolerating it without adverse side effects. She states she feels like it is helping with her pain. If we could send in a new prescription of 600mg tablets TID. I told her it will be one pill TID now instead of the two TID. [facility]udia Hyde. Hussein is aware and is going to move up appt for 4-6 weeks from now.

## 2025-06-10 ENCOUNTER — APPOINTMENT (OUTPATIENT)
Dept: OTOLARYNGOLOGY | Facility: CLINIC | Age: 45
End: 2025-06-10
Payer: COMMERCIAL

## 2025-07-01 ENCOUNTER — APPOINTMENT (OUTPATIENT)
Dept: OTOLARYNGOLOGY | Facility: CLINIC | Age: 45
End: 2025-07-01
Payer: COMMERCIAL

## 2025-07-09 ENCOUNTER — OFFICE VISIT (OUTPATIENT)
Dept: PAIN MEDICINE | Facility: HOSPITAL | Age: 45
End: 2025-07-09
Payer: COMMERCIAL

## 2025-07-09 VITALS
HEIGHT: 57 IN | WEIGHT: 97 LBS | HEART RATE: 90 BPM | DIASTOLIC BLOOD PRESSURE: 87 MMHG | SYSTOLIC BLOOD PRESSURE: 135 MMHG | RESPIRATION RATE: 16 BRPM | OXYGEN SATURATION: 99 % | BODY MASS INDEX: 20.93 KG/M2

## 2025-07-09 DIAGNOSIS — M54.81 CERVICO-OCCIPITAL NEURALGIA: Primary | ICD-10-CM

## 2025-07-09 DIAGNOSIS — M79.2 NEUROPATHIC PAIN: ICD-10-CM

## 2025-07-09 DIAGNOSIS — M54.12 CERVICAL RADICULOPATHY: ICD-10-CM

## 2025-07-09 DIAGNOSIS — M48.02 CERVICAL STENOSIS OF SPINAL CANAL: ICD-10-CM

## 2025-07-09 DIAGNOSIS — M79.18 MYOFASCIAL PAIN: ICD-10-CM

## 2025-07-09 PROCEDURE — 99214 OFFICE O/P EST MOD 30 MIN: CPT | Performed by: CLINICAL NURSE SPECIALIST

## 2025-07-09 PROCEDURE — 4004F PT TOBACCO SCREEN RCVD TLK: CPT | Performed by: CLINICAL NURSE SPECIALIST

## 2025-07-09 PROCEDURE — 3008F BODY MASS INDEX DOCD: CPT | Performed by: CLINICAL NURSE SPECIALIST

## 2025-07-09 RX ORDER — GABAPENTIN 300 MG/1
CAPSULE ORAL
Qty: 270 CAPSULE | Refills: 1 | Status: SHIPPED | OUTPATIENT
Start: 2025-07-09

## 2025-07-09 RX ORDER — DULOXETIN HYDROCHLORIDE 60 MG/1
60 CAPSULE, DELAYED RELEASE ORAL DAILY
Qty: 30 CAPSULE | Refills: 2 | Status: SHIPPED | OUTPATIENT
Start: 2025-07-09 | End: 2025-08-08

## 2025-07-09 RX ORDER — DIAZEPAM 5 MG/1
5 TABLET ORAL ONCE AS NEEDED
OUTPATIENT
Start: 2025-07-09

## 2025-07-09 ASSESSMENT — ENCOUNTER SYMPTOMS
DEPRESSION: 0
OCCASIONAL FEELINGS OF UNSTEADINESS: 0
LOSS OF SENSATION IN FEET: 0

## 2025-07-09 NOTE — PROGRESS NOTES
Subjective   Patient ID: Cora Solares is a 45 y.o. female who presents for cervical pain.  HPI  45-year-old female with history of cervical pain presents for follow-up. Cervical pain accompanied by chronic headaches as well as myofascial component to pain. Previously did well with greater occipital nerve blocks. Patient noted a significant increase in her cervical symptoms accompanied by upper extremity weakness, numbness/tingling. Cervical MRI consistent with degenerative disc disease and facet arthrosis with moderate spinal canal stenosis at C4-5 and C5-6. Patient was sent for neurosurgical evaluation and at that point scheduled for C3 and C7 laminectomy and C4-C6 laminoplasty on 12/19/2024.  Patient initially had difficult recovery secondary to significant pain accompanied by muscle tightness and spasms.  Failed Valium and tizanidine and was rotated to Robaxin for muscle tightness and spasms.  Added duloxetine and increased her dose to 90 mg daily for neuropathic component of pain.  Added ibuprofen for breakthrough pain.  Added NSAID compounding cream for topical pain relief.  Encourage patient to continue use of her TENS unit.  She continues to utilize THC products most often at bedtime.  We have previously discussed the addition of Lyrica and patient wanted to avoid Lyrica as she said her brother took his own life after taking Lyrica. She had previously taken gabapentin without adverse effects.  Restarted gabapentin at 600 mg 3 times daily.  Patient was also scheduled to start ketamine infusions. She was proceeding with a formal course of physical therapy as prescribed by her neurosurgeon.  Provided a referral for counseling and psychiatric follow-up.  Presenting at today's office visit for routine follow-up.  Currently experiencing cervical pain with intermittent radiation to bilateral shoulders and into her posterior head.  Numbness and tingling intermittently in her left shoulder and occasionally left  jaw.  Denies any increasing weakness or balance issues. Pain is accompanied by headaches as well as muscle tightness throughout her cervical region. Pain increasing with repetitive movement of her upper extremities. Continues to manage her pain with gabapentin 600 mg 3 times daily.  She does feel that the medication is providing some additional relief. Continues duloxetine 90 mg daily and Robaxin as needed for muscle tightness and spasms.  She completed physical therapy and is continuing home therapy exercises and stretches.  She has proceeded with 1 ketamine infusion and felt that it was helpful.  She is planning to continue. Discontinued NSAID cream due to ineffectiveness.     Patient states she has neck pain that radiates into shoulders. Occipital nerve pain bilaterally and numbness in the jaw. Left trap pain.     Ketamine infusion-one so far     Pain Score: 8/10     Injection  8/2/24 ONB Left- with moderate relief     Medications: Duloxetine 90 total- 60mg needs refill, Robaxin 500mg TID-no refill/Ibuprofen-needs refill/Gabapentin 600 mg TID- taking 1 in am and 2 at bedtime- no refill    Other: PT done- doing exercises at home   OARRS:  No data recorded  I have personally reviewed the OARRS report for Cora Solares. I have considered the risks of abuse, dependence, addiction and diversion      Review of Systems    ROS:   General: No fevers, chills, weight loss  Skin: Negative for lesions  Eyes: No acute vision changes  Ears: No vertigo  Nose, mouth, throat: No difficulty swallowing or speaking  Respiratory: No cough, shortness of breath, cyanosis  Cardiovascular: Negative for chest pain syncope or palpitation  Gastrointestinal: No constipation, nausea, vomiting  Neurological: Positive for headache, positive for: Paresthesia  Psychological: Negative for severe or debilitating anxiety, depression. Negative memory loss  Musculoskeletal: Positive for Arthralgia, myalgia, pain and muscle tightness  Endocrine:  Negative for weight gain, appetite changes, excessive sweating  Allergy/immune: Negative    All 13 systems were reviewed and are within normal levels except as noted or in the history of present illness.  Positive or pertinent negative responses are noted or were in the history of present illness. As noted, the patient denies significant or impairing weakness in the bilateral upper and lower extremities, medication induced constipation, and bowel or bladder incontinence.   Current Medications[1]     Medical History[2]     Surgical History[3]     Family History[4]     RX Allergies[5]     Objective     Visit Vitals  OB Status Hysterectomy   Smoking Status Every Day        Physical Exam    PE:  General: Well-developed, well-nourished, no acute distress. The patient demonstrates no pain behavior, symptom magnification or overt drug-seeking behavior.  Eye: Pupils appropriate for room lighting  Neck/thyroid: No obvious goiter or enlargement of neck noted  Respiratory exam: Normal respiratory effort, unlabored respiration. No accessory muscle use noted  Cardiac exam: Bilateral radial pulses intact  Abdominal: Nondistended  Spine, cervical: Tenderness to paraspinous musculature paracervical region bilaterally. Myofascial tenderness and muscle tightness upper trapezius muscles bilaterally.  Flexion and extension intact.  Rotational twisting increasing pain.  Well-healed surgical incision.  Spine, lumbar: The patient is able to rise from a seated to standing position without hesitancy, push off, or delay. Gait is grossly nonantalgic. No tenderness to paraspinous musculature is noted.  Neurologic exam: Muscle strength is antigravity in all 4 extremities. Equal strength bilateral upper extremities 5/5.  Psychiatric exam: Judgment and insight normal, affect normal, speech is fluent, affect appropriate, demonstrating no signs of hypersomnolence, sedation, or confusion        Assessment/Plan   Problem List Items Addressed This  Visit           ICD-10-CM    Myofascial pain M79.18    Relevant Orders    Referral to [facility]    Cervical radiculopathy M54.12    Relevant Medications    DULoxetine (Cymbalta) 60 mg DR capsule    Cervical stenosis of spinal canal M48.02    45 year-old female with history of occipital neuralgia responding well to previous greater occipital nerve blocks and cervical Stenosis status post recent laminectomy and laminoplasty on 12/19/2024.  She completed a formal course of postoperative physical therapy which has helped her mobility. Continues to experience neuropathic component of pain postoperatively.  Currently most bothered by occipital pain and increasing headaches.  She has done well with restarting gabapentin 600 mg 3 times daily.  Advised patient that we could increase her dose to 900 mg 3 times daily for additional neuropathic relief.  Recommend she continue duloxetine 90 mg daily and Robaxin for muscle tightness and spasms. She will continue marijuana products as she feels this is also beneficial. Recommended she continue home therapy exercises and stretches consistently for optimal relief.  I do think the patient may benefit from a referral to the [facility] which may incorporate massage targeting her myofascial component of pain.  Provided the patient with a referral.  Advised patient to continue ketamine infusions as this may provide additional pain relief for neuropathic symptoms. Presentation and exam today consistent with bilateral greater occipital neuritis. Exam consistent with tenderness over bilateral greater occipital protuberances.  She has done well with previous greater occipital nerve blocks which have provided significant relief and a decrease in headaches. Reviewed risks and benefits with the patient and she would like to proceed with this injection. Plan reviewed with patient at today's visit.      -Increase gabapentin 900 mg 3 times daily for additional neuropathic relief.  Reviewed most recent CMP with adequate renal function. The patient was counseled on the risks and potential side effects of gabapentin as well as its importance for dosage titration. Side effects included but were not limited to, drowsiness, sedation, cognitive decline, peripheral edema, weight gain, seizures, with abrupt withdrawal.  Patient was instructed to call the office with any concerns or side effects before abruptly stopping medication.   -Continue Robaxin 500 mg up to 3 times per day as needed for muscle tightness and spasm.  Reviewed side effects with patient.  - Continue duloxetine to 90 mg daily for additional neuropathic relief.    -Continue ibuprofen 800 mg which she may take up to 3 times per day as needed for pain. The patient was cautioned about possible side effects and risks of this medication including stomach upset, stomach ulcers, kidney risks and cardiovascular risks to include hypertension and slightly increased risks of stroke and myocardial infarction. Also discussed were ways to minimize these risks by taking this medication with food, staying well-hydrated, watching for any hypertension, and taking the medication with a stomach protectant such as pepcid, zantac, or a proton pump inhibitor.  Reviewed CMP with adequate renal function.  -Recommended patient continue ketamine infusions targeting depression.  Advised patient that she may receive additional benefit with ketamine for neuropathic component of pain.    -Advised patient to continue use of her TENS unit.  -Recommended patient continue to follow closely with psychiatry.    - Provided a referral to the [facility] for integrative medicine to incorporate massage for myofascial component of pain. -Follow-up in our office in 6 months or sooner if needed.         Relevant Medications    gabapentin (Neurontin) 300 mg capsule    Other Relevant Orders    Referral to [facility]     Other Visit Diagnoses         Codes       Cervico-occipital neuralgia    -  Primary M54.81    Relevant Medications    gabapentin (Neurontin) 300 mg capsule    Other Relevant Orders    Nerve Block    US guided pain procedure    Referral to [facility]      Neuropathic pain     M79.2    Relevant Medications    DULoxetine (Cymbalta) 60 mg DR capsule                 [1]   Current Outpatient Medications:     albuterol 90 mcg/actuation aerosol powdr breath activated inhaler, Inhale 2 puffs every 6 hours., Disp: , Rfl:     budesonide-formoteroL (Symbicort) 160-4.5 mcg/actuation inhaler, Inhale 2 puffs 2 times a day., Disp: , Rfl:     DULoxetine (Cymbalta) 30 mg DR capsule, Take 1 capsule (30 mg) by mouth once daily. Do not crush or chew.  Patient will take duloxetine 30 mg with duloxetine 60 mg for total of 90 mg daily., Disp: 30 capsule, Rfl: 5    DULoxetine (Cymbalta) 60 mg DR capsule, Take 1 capsule (60 mg) by mouth once daily. Do not crush or chew.  Patient will take duloxetine 60 mg with duloxetine 30 mg for a total of 90 mg daily., Disp: 30 capsule, Rfl: 2    gabapentin (Neurontin) 600 mg tablet, Take 1 tablet (600 mg) by mouth 3 times a day., Disp: 90 tablet, Rfl: 2    ibuprofen 800 mg tablet, Take 1 tablet (800 mg) by mouth every 8 hours if needed for mild pain (1 - 3). Take with food, Disp: 90 tablet, Rfl: 1    methocarbamol (Robaxin) 500 mg tablet, Take 1 tablet (500 mg) by mouth 3 times a day as needed for muscle spasms., Disp: 90 tablet, Rfl: 1    polyethylene glycol (Glycolax, Miralax) 17 gram/dose powder, Mix 17 g of powder and drink once daily for 10 days, Disp: 238 g, Rfl: 0    varenicline tartrate (Chantix) 0.5 mg tablet, Take 1 daily for 3 days, then take 1 daily two times per day for 1 week, then take 2 tabs in am and 1 in pm for 1 week, then start 2 tabs twice daily, take with full glass of water. Continue 2 tabs daily, Disp: 120 tablet, Rfl: 1  [2]   Past Medical History:  Diagnosis Date    Acute laryngitis 03/08/2016    Acute  laryngitis    Asthma     Cervical disc disease     Cutaneous abscess of chest wall 05/28/2014    Chest wall abscess    Cutaneous abscess of left lower limb 10/18/2016    Abscess of left thigh    Cutaneous abscess of right lower limb 08/14/2014    Abscess of right thigh    Depression     Headache 12/2021    Low back pain     Neck pain     Noninfective gastroenteritis and colitis, unspecified 10/21/2014    Acute gastroenteritis    Other fecal abnormalities 02/02/2016    Loose stools    Personal history of other diseases of the respiratory system 09/26/2016    History of acute sinusitis    Personal history of other diseases of the respiratory system 01/13/2015    History of influenza    Personal history of other infectious and parasitic diseases 11/30/2015    History of herpes labialis    Personal history of other specified conditions 04/11/2016    History of nausea    Personal history of other specified conditions 11/30/2015    History of dysuria    Personal history of urinary (tract) infections 12/31/2014    History of urinary tract infection    Spinal stenosis     Unspecified abdominal pain 02/26/2016    Abdominal cramping    Unspecified otitis externa, right ear 06/18/2014    Otitis externa of right ear   [3]   Past Surgical History:  Procedure Laterality Date    CERVICAL FUSION      COLONOSCOPY      removed polyp    HYSTERECTOMY  05/19/2012    Hysterectomy    LAMINECTOMY  12/19/2024    MANDIBLE SURGERY      2023 mass removed    POSTERIOR CERVICAL LAMINECTOMY     [4]   Family History  Problem Relation Name Age of Onset    Hypertension Mother Mom     Diabetes Mother Mom     Hypertension Father Dad     Breast cancer Father's Sister      Breast cancer Father's Sister      Breast cancer Father's Sister      Breast cancer Father's Sister      Breast cancer Father's Sister     [5]   Allergies  Allergen Reactions    Magnesium Citrate Other     mouth sores    Penicillins Unknown    Sulfa (Sulfonamide Antibiotics) Unknown     Sulfamethoxazole-Trimethoprim Other

## 2025-07-10 NOTE — ASSESSMENT & PLAN NOTE
45 year-old female with history of occipital neuralgia responding well to previous greater occipital nerve blocks and cervical Stenosis status post recent laminectomy and laminoplasty on 12/19/2024.  She completed a formal course of postoperative physical therapy which has helped her mobility. Continues to experience neuropathic component of pain postoperatively.  Currently most bothered by occipital pain and increasing headaches.  She has done well with restarting gabapentin 600 mg 3 times daily.  Advised patient that we could increase her dose to 900 mg 3 times daily for additional neuropathic relief.  Recommend she continue duloxetine 90 mg daily and Robaxin for muscle tightness and spasms. She will continue marijuana products as she feels this is also beneficial. Recommended she continue home therapy exercises and stretches consistently for optimal relief.  I do think the patient may benefit from a referral to the [facility] which may incorporate massage targeting her myofascial component of pain.  Provided the patient with a referral.  Advised patient to continue ketamine infusions as this may provide additional pain relief for neuropathic symptoms. Presentation and exam today consistent with bilateral greater occipital neuritis. Exam consistent with tenderness over bilateral greater occipital protuberances.  She has done well with previous greater occipital nerve blocks which have provided significant relief and a decrease in headaches. Reviewed risks and benefits with the patient and she would like to proceed with this injection. Plan reviewed with patient at today's visit.      -Increase gabapentin 900 mg 3 times daily for additional neuropathic relief. Reviewed most recent CMP with adequate renal function. The patient was counseled on the risks and potential side effects of gabapentin as well as its importance for dosage titration. Side effects included but were not limited to, drowsiness, sedation,  cognitive decline, peripheral edema, weight gain, seizures, with abrupt withdrawal.  Patient was instructed to call the office with any concerns or side effects before abruptly stopping medication.   -Continue Robaxin 500 mg up to 3 times per day as needed for muscle tightness and spasm.  Reviewed side effects with patient.  - Continue duloxetine to 90 mg daily for additional neuropathic relief.    -Continue ibuprofen 800 mg which she may take up to 3 times per day as needed for pain. The patient was cautioned about possible side effects and risks of this medication including stomach upset, stomach ulcers, kidney risks and cardiovascular risks to include hypertension and slightly increased risks of stroke and myocardial infarction. Also discussed were ways to minimize these risks by taking this medication with food, staying well-hydrated, watching for any hypertension, and taking the medication with a stomach protectant such as pepcid, zantac, or a proton pump inhibitor.  Reviewed CMP with adequate renal function.  -Recommended patient continue ketamine infusions targeting depression.  Advised patient that she may receive additional benefit with ketamine for neuropathic component of pain.    -Advised patient to continue use of her TENS unit.  -Recommended patient continue to follow closely with psychiatry.    - Provided a referral to the [facility] for integrative medicine to incorporate massage for myofascial component of pain. -Follow-up in our office in 6 months or sooner if needed.

## 2025-07-11 ENCOUNTER — APPOINTMENT (OUTPATIENT)
Dept: OTOLARYNGOLOGY | Facility: CLINIC | Age: 45
End: 2025-07-11
Payer: COMMERCIAL

## 2025-07-18 ENCOUNTER — APPOINTMENT (OUTPATIENT)
Dept: INTEGRATIVE MEDICINE | Facility: CLINIC | Age: 45
End: 2025-07-18
Payer: COMMERCIAL

## 2025-07-25 ENCOUNTER — TELEPHONE (OUTPATIENT)
Dept: OTOLARYNGOLOGY | Facility: HOSPITAL | Age: 45
End: 2025-07-25
Payer: COMMERCIAL

## 2025-07-25 NOTE — TELEPHONE ENCOUNTER
Attempted to call patient as she did not show for her appointment with Dr. Miller. Sent [employer] message as well.

## 2025-07-28 ENCOUNTER — APPOINTMENT (OUTPATIENT)
Dept: PRIMARY CARE | Facility: CLINIC | Age: 45
End: 2025-07-28
Payer: COMMERCIAL

## 2025-07-28 VITALS
HEIGHT: 57 IN | WEIGHT: 92.2 LBS | OXYGEN SATURATION: 99 % | DIASTOLIC BLOOD PRESSURE: 84 MMHG | RESPIRATION RATE: 18 BRPM | HEART RATE: 61 BPM | SYSTOLIC BLOOD PRESSURE: 123 MMHG | BODY MASS INDEX: 19.89 KG/M2 | TEMPERATURE: 97.9 F

## 2025-07-28 DIAGNOSIS — F17.200 SMOKER: ICD-10-CM

## 2025-07-28 DIAGNOSIS — R92.343 EXTREMELY DENSE TISSUE OF BOTH BREASTS ON MAMMOGRAPHY: Primary | ICD-10-CM

## 2025-07-28 DIAGNOSIS — M48.02 CERVICAL STENOSIS OF SPINAL CANAL: ICD-10-CM

## 2025-07-28 PROBLEM — R11.0 NAUSEA: Status: ACTIVE | Noted: 2022-07-13

## 2025-07-28 PROBLEM — S67.22XA HAND CRUSH INJURY, LEFT, INITIAL ENCOUNTER: Status: RESOLVED | Noted: 2023-06-30 | Resolved: 2025-07-28

## 2025-07-28 PROBLEM — M19.90 ARTHRITIS: Status: ACTIVE | Noted: 2023-09-22

## 2025-07-28 PROBLEM — R41.89 UNRESPONSIVE: Status: RESOLVED | Noted: 2025-07-28 | Resolved: 2025-07-28

## 2025-07-28 PROBLEM — S60.229A CONTUSION OF HAND: Status: RESOLVED | Noted: 2022-07-20 | Resolved: 2025-07-28

## 2025-07-28 PROBLEM — R10.30 LOWER ABDOMINAL PAIN: Status: RESOLVED | Noted: 2023-06-30 | Resolved: 2025-07-28

## 2025-07-28 PROBLEM — K59.00 CONSTIPATION: Status: RESOLVED | Noted: 2023-06-30 | Resolved: 2025-07-28

## 2025-07-28 PROBLEM — S61.219A LACERATION OF FINGER: Status: RESOLVED | Noted: 2023-02-02 | Resolved: 2025-07-28

## 2025-07-28 PROBLEM — Z86.16 PERSONAL HISTORY OF COVID-19: Status: RESOLVED | Noted: 2022-10-05 | Resolved: 2025-07-28

## 2025-07-28 PROBLEM — R05.9 COUGH: Status: RESOLVED | Noted: 2023-06-30 | Resolved: 2025-07-28

## 2025-07-28 PROBLEM — L72.9 INFECTED CYST OF SKIN: Status: RESOLVED | Noted: 2023-06-30 | Resolved: 2025-07-28

## 2025-07-28 PROBLEM — N39.0 URINARY TRACT INFECTION: Status: RESOLVED | Noted: 2025-07-28 | Resolved: 2025-07-28

## 2025-07-28 PROBLEM — J30.9 ALLERGIC RHINITIS: Status: RESOLVED | Noted: 2023-06-30 | Resolved: 2025-07-28

## 2025-07-28 PROBLEM — J11.1 INFLUENZA: Status: RESOLVED | Noted: 2025-07-28 | Resolved: 2025-07-28

## 2025-07-28 PROBLEM — L02.91 ABSCESS: Status: RESOLVED | Noted: 2023-06-30 | Resolved: 2025-07-28

## 2025-07-28 PROBLEM — L08.9 INFECTED CYST OF SKIN: Status: RESOLVED | Noted: 2023-06-30 | Resolved: 2025-07-28

## 2025-07-28 PROCEDURE — 99214 OFFICE O/P EST MOD 30 MIN: CPT | Performed by: INTERNAL MEDICINE

## 2025-07-28 PROCEDURE — 3008F BODY MASS INDEX DOCD: CPT | Performed by: INTERNAL MEDICINE

## 2025-07-28 RX ORDER — VARENICLINE TARTRATE 0.5 MG/1
TABLET, FILM COATED ORAL
Qty: 120 TABLET | Refills: 0 | Status: SHIPPED | OUTPATIENT
Start: 2025-07-28

## 2025-07-28 RX ORDER — VARENICLINE TARTRATE 1 MG/1
1 TABLET, FILM COATED ORAL 2 TIMES DAILY
Qty: 60 TABLET | Refills: 0 | Status: SHIPPED | OUTPATIENT
Start: 2025-08-27 | End: 2025-09-26

## 2025-07-28 SDOH — ECONOMIC STABILITY: FOOD INSECURITY: WITHIN THE PAST 12 MONTHS, THE FOOD YOU BOUGHT JUST DIDN'T LAST AND YOU DIDN'T HAVE MONEY TO GET MORE.: NEVER TRUE

## 2025-07-28 SDOH — ECONOMIC STABILITY: FOOD INSECURITY: WITHIN THE PAST 12 MONTHS, YOU WORRIED THAT YOUR FOOD WOULD RUN OUT BEFORE YOU GOT MONEY TO BUY MORE.: NEVER TRUE

## 2025-07-28 ASSESSMENT — LIFESTYLE VARIABLES
HOW MANY STANDARD DRINKS CONTAINING ALCOHOL DO YOU HAVE ON A TYPICAL DAY: PATIENT DOES NOT DRINK
SKIP TO QUESTIONS 9-10: 1
AUDIT-C TOTAL SCORE: 0
HOW OFTEN DO YOU HAVE SIX OR MORE DRINKS ON ONE OCCASION: NEVER
HOW OFTEN DO YOU HAVE A DRINK CONTAINING ALCOHOL: NEVER

## 2025-07-28 NOTE — ASSESSMENT & PLAN NOTE
FMLA paperwork is completed, patient will go for Ketamine infusions weekly, she may need additional treatments also

## 2025-07-28 NOTE — PROGRESS NOTES
Chief Complaint/HPI:    Chief Complaint/HPI:     Chronic neck pain: patient sees pain management .  Patient is having ongoing neck issues. She is being treated for occipital neuralgia, patient has had ongoing symptoms since 2021. Patient had x rays completed, she has had tension and muscle spasms in the neck.  Patient had cervical laminoplasty and laminectomy completed per Dr Verma. She does see pain management now.   She uses Nex Wave for pain management.  Patient  has ongoing pain when she looks up and down, this is causing ongoing issues at work. Patient had x rays completed, she has had dry needling procedure. Patient will start message therapy and acupuncture. Patient recently has been prescribed a compounded topical med to use to treat the spasms  , patient gets Ketamine infusions now, these will be scheduled monthly, she  does need a functional capacity evaluation for disability assessment she states. She has difficulty lifting her arms. She requires FMLA paperwork completed.     Patient is s/p hysterectomy     Smoker: patient would like to quit smoking, patient could not get refill of Chantix, patient states that the Chantix was helpful. She was not able to continue the med.      Dense breast tissue: patient has dense breast tissue, she had mammogram in 2/2025, a follow up mammogram is suggested in 6 months, patient now goes to breast cancer center at [facility]. Patient is to have genetic testing completed    ROS otherwise negative aside from what was mentioned above in HPI.      Problem List[1]      Medical History[2]  Surgical History[3]  Social History     Social History Narrative    Not on file         ALLERGIES  Magnesium citrate, Penicillins, Sulfa (sulfonamide antibiotics), and Sulfamethoxazole-trimethoprim      MEDICATIONS  Medications Ordered Prior to Encounter[4]      PHYSICAL EXAM  /84 (BP Location: Right arm, Patient Position: Sitting, BP Cuff Size: Adult)   Pulse 61   Temp 36.6 °C (97.9 °F)  "(Temporal)   Resp 18   Ht (!) 1.448 m (4' 9\")   Wt (!) 41.8 kg (92 lb 3.2 oz)   SpO2 99%   BMI 19.95 kg/m²   Body mass index is 19.95 kg/m².    Gen: Alert, NAD, wearing glasses  HEENT:  EOMI, conjunctiva and sclera normal in appearance, no thyromegaly, tender posterior /bilateral  neck is noted, the area is tender to palpation, a small submental scar is noted also   Respiratory:  Lungs CTAB, slightly diminished breath sounds throughout  Cardiovascular:  Heart RRR. No M/R/G, no peripheral edema noted, no carotid bruits are noted  Neuro:  neck tenderness is noted, no focal deficits are present, CN 2-12 are intact  Skin:  No suspicious lesions present on exposed skin  MSK: tender over the posterior neck bilaterally, discomfort noted with movement of the neck    ASSESSMENT/PLAN  Problem List Items Addressed This Visit       Cervical stenosis of spinal canal    Current Assessment & Plan   FMLA paperwork is completed, patient will go for Ketamine infusions weekly, she may need additional treatments also          Extremely dense tissue of both breasts on mammography - Primary    Current Assessment & Plan   Patient will follow up with [facility] risk breast cancer center for genetic testing          Smoker    Current Assessment & Plan   Chantix is reordered for the patient, encourage complete smoking cessation         Relevant Medications    varenicline tartrate (Chantix) 0.5 mg tablet    varenicline tartrate (Chantix) 1 mg tablet (Start on 8/27/2025)     Follow up in 6 months, quit smoking, FMLA paperwork is completed, patient had surgery in 12/2024    Enoc Mott MD          [1]   Patient Active Problem List  Diagnosis    Depression    Chronic neck pain    Reactive airway disease (HHS-HCC)    Asthma    Elevated MCV    Endometriosis    External hemorrhoids    Herpes labialis    Occipital neuralgia of left side    Low back pain    Left breast lump    Insomnia    Opioid abuse, episodic    Right hip pain    Right " anterior knee pain    Lipid screening    Posterior cervical adenopathy    Myofascial pain    Breast calcifications on mammogram    Cervical radiculopathy    Cervical spondylosis with myelopathy    Cervical stenosis of spinal canal    Smoker    Muscle spasms of neck    Acute otitis externa of right ear    Extremely dense tissue of both breasts on mammography    Abnormal finding on breast imaging    Family history of breast cancer    Arthritis    Nausea   [2]   Past Medical History:  Diagnosis Date    Acute laryngitis 03/08/2016    Acute laryngitis    Asthma     Cervical disc disease     Contusion of hand 07/20/2022    Cutaneous abscess of chest wall 05/28/2014    Chest wall abscess    Cutaneous abscess of left lower limb 10/18/2016    Abscess of left thigh    Cutaneous abscess of right lower limb 08/14/2014    Abscess of right thigh    Depression     Headache 12/2021    Influenza 07/28/2025    Low back pain     Neck pain     Noninfective gastroenteritis and colitis, unspecified 10/21/2014    Acute gastroenteritis    Other fecal abnormalities 02/02/2016    Loose stools    Personal history of COVID-19 10/05/2022    Personal history of other diseases of the respiratory system 09/26/2016    History of acute sinusitis    Personal history of other diseases of the respiratory system 01/13/2015    History of influenza    Personal history of other infectious and parasitic diseases 11/30/2015    History of herpes labialis    Personal history of other specified conditions 04/11/2016    History of nausea    Personal history of other specified conditions 11/30/2015    History of dysuria    Personal history of urinary (tract) infections 12/31/2014    History of urinary tract infection    Spinal stenosis     Unresponsive 07/28/2025    Comment on above: UNRESPONSIVE      Unspecified abdominal pain 02/26/2016    Abdominal cramping    Unspecified otitis externa, right ear 06/18/2014    Otitis externa of right ear    Urinary tract  infection 07/28/2025   [3]   Past Surgical History:  Procedure Laterality Date    CERVICAL FUSION      COLONOSCOPY      removed polyp    HYSTERECTOMY  05/19/2012    Hysterectomy    LAMINECTOMY  12/19/2024    MANDIBLE SURGERY      2023 mass removed    POSTERIOR CERVICAL LAMINECTOMY     [4]   Current Outpatient Medications on File Prior to Visit   Medication Sig Dispense Refill    albuterol 90 mcg/actuation aerosol powdr breath activated inhaler Inhale 2 puffs every 6 hours.      budesonide-formoteroL (Symbicort) 160-4.5 mcg/actuation inhaler Inhale 2 puffs 2 times a day.      DULoxetine (Cymbalta) 60 mg DR capsule Take 1 capsule (60 mg) by mouth once daily. Do not crush or chew.  Patient will take duloxetine 60 mg with duloxetine 30 mg for a total of 90 mg daily. 30 capsule 2    ibuprofen 800 mg tablet Take 1 tablet (800 mg) by mouth every 8 hours if needed for mild pain (1 - 3). Take with food 90 tablet 1    polyethylene glycol (Glycolax, Miralax) 17 gram/dose powder Mix 17 g of powder and drink once daily for 10 days 238 g 0    DULoxetine (Cymbalta) 30 mg DR capsule Take 1 capsule (30 mg) by mouth once daily. Do not crush or chew.  Patient will take duloxetine 30 mg with duloxetine 60 mg for total of 90 mg daily. 30 capsule 5    gabapentin (Neurontin) 300 mg capsule Patient will take 900 mg of gabapentin 3 times daily. 270 capsule 1    methocarbamol (Robaxin) 500 mg tablet Take 1 tablet (500 mg) by mouth 3 times a day as needed for muscle spasms. 90 tablet 1    [DISCONTINUED] varenicline tartrate (Chantix) 0.5 mg tablet Take 1 daily for 3 days, then take 1 daily two times per day for 1 week, then take 2 tabs in am and 1 in pm for 1 week, then start 2 tabs twice daily, take with full glass of water. Continue 2 tabs daily (Patient not taking: Reported on 7/28/2025) 120 tablet 1     No current facility-administered medications on file prior to visit.

## 2025-07-29 ENCOUNTER — TELEPHONE (OUTPATIENT)
Dept: PAIN MEDICINE | Facility: CLINIC | Age: 45
End: 2025-07-29

## 2025-07-29 ENCOUNTER — TELEPHONE (OUTPATIENT)
Dept: PRIMARY CARE | Facility: CLINIC | Age: 45
End: 2025-07-29

## 2025-07-29 ENCOUNTER — APPOINTMENT (OUTPATIENT)
Dept: GENETICS | Facility: CLINIC | Age: 45
End: 2025-07-29
Payer: COMMERCIAL

## 2025-07-29 DIAGNOSIS — Z80.3 FAMILY HISTORY OF BREAST CANCER: ICD-10-CM

## 2025-07-29 PROCEDURE — 96041 GENETIC COUNSELING SVC EA 30: CPT | Performed by: GENETIC COUNSELOR, MS

## 2025-07-29 NOTE — PROGRESS NOTES
Subjective   Patient ID: Cora Solares is a 45 y.o. female who was referred to cancer genetic counseling by TRU Mishra due to a family history of breast cancer. We reviewed the hereditary aspects of cancer, and the genetic testing options available to her.    CANCER MEDICAL HISTORY   Personal history of cancer?   -no     PREVIOUS GENETIC TESTING?   -no     CANCER SCREENING HISTORY   Mammogram/MRI?   -extremely dense tissue   -: bilateral probably benign groupings of calcifications     Breast biopsy?   -no     Ovarian cancer screening (ultrasound or CA-125)?   -no     PAP smear?   -: dysplasia, s/p hysterectomy     Colonoscopy?   -: one hyperplastic polyp     Endoscopy?   -no     Hysterectomy?   - due to endometriosis and dysplasia     Oophorectomy?   -no     Dermatology?   -no     Other cancer screening?   -no     Radiation?   -no     OTHER MEDICAL CONCERNS   -depression   -asthma     REPRODUCTIVE HISTORY   # pregnancies: 4   # children: 4   AFLB: 15   Breastfeeding: no   Menarche: 14   Menopause: perimenopausal   OCP: no   HRT: no     SOCIAL HISTORY   Smoking: everyday smoker   Alcohol use: not currently   Career:  for [employer] company     FAMILY HISTORY:   A 4-generation pedigree was obtained. The family history was significant for the following:     -Niece (22) diagnosed with thyroid cancer at age 16.     -Father diagnosed with melanoma and rectal cancer at unknown ages,  of lung cancer at age 75.   -Paternal aunt  of breast cancer at age 35.   -Paternal aunt  of breast cancer at age 40.   -Paternal aunt  of breast cancer at age 45.   -Paternal aunt  of breast cancer at age 55.   -Paternal aunt  of breast cancer at unknown age.   -Paternal grandmother  of unknown cancer type.     -Maternal aunt  of breast cancer in her 60s.   -Maternal aunt  of brain cancer at age 44.   -Maternal cousin  of liver cancer at unknown age.    -Maternal cousin  of unknown cancer type in her 40s.   -Maternal grandmother  of lung cancer at unknown age.   -Maternal grandfather  of unknown cancer type at unknown age.     Consanguinity and Ashkenazi Ep[employer] ancestry were denied. The patient is of  descent. The remainder of the family history was negative for intellectual disability, birth defects, miscarriages, stillbirths, blindness, deafness, kidney disease, heart disease, cancer, muscle disease, and blood disorders.     Assessment/Plan   Genetic Test Ordered: yes    Cora Solares is a 45 y.o. female with a family history of breast cancer. Based on this history of breast cancer, Cora meets NCCN criteria for testing of hereditary breast and ovarian cancer genes. Cora is interested in testing, which is recommended, and was ordered today via the 40-gene CancerNext +Done. panel from [facility]. Our discussion is summarized below.    We reviewed the limitations of testing an unaffected individual for a hereditary cancer syndrome. When testing an individual who has not had a cancer diagnosis, like Cora, a negative test result may have limited utility in defining future cancer risks, as it is unclear whether the affected family members had a mutation in one of the genes that the unaffected individual did not inherit, or alternatively, whether there is another untested risk factor to explain the family history of cancer that is shared between the unaffected individual and their family members.    We reviewed genes and chromosomes, inherited forms of breast and ovarian cancer, and the BRCA1 and BRCA2 genes causing HBOC (hereditary breast and ovarian cancer syndrome). We discussed that most cancers are NOT due to an inherited genetic susceptibility. However, in about 5-10% of families, there is an inherited genetic mutation that can make a person more susceptible to developing certain forms of cancer. Within these families, we often see  multiple family members with cancer, occurring in multiple generations. In addition, earlier onset and bilateral cancers are suggestive of an inherited form of cancer. Finally, there is a clustering of certain types of cancer in these families, such as breast and ovarian cancer.    We discussed the BRCA1 and BRCA2 genes, which are two genes that have been linked to early-onset breast and/or ovarian cancer. Changes in these genes (sometimes referred to as mutations) are inherited in a dominant pattern and confer up to an 87% lifetime risk for breast cancer. This is elevated compared to the general population risk of 10-12%. Mutation carriers who have already been diagnosed with cancer have an increased risk to develop a second, contralateral breast cancer. In addition, BRCA1 and BRCA2 mutation carriers have up to a 45% lifetime risk for ovarian cancer, which is elevated over the 1.1% general population risk. BRCA2 gene mutation carriers have an increased risk for male breast cancer, prostate cancer, melanoma and pancreatic cancer.    Gene mutations in many cancer predisposition genes such as BRCA1/BRCA2 are inherited in an autosomal dominant fashion. This means that if an individual has a change in either of these genes their first degree relatives (I.e siblings, children and parents) each have a 50% chance of also having that gene change and a 50% chance of not having the gene change.     We discussed that there are multiple genes associated with increased breast cancer risk. Some genes, like the BRCA1 and BRCA2 genes, are considered highly penetrant breast cancer genes, meaning a mutation in the gene confers a high risk of breast cancer. Other high penetrant breast cancer genes include the following genes: CDH1, PALB2, PTEN, TP53, STK11. Additionally, there are other intermediate (moderate risk) breast cancer genes. Other moderate risk breast cancer genes include the following genes: TAWANDA, BARD1, BMPR1A, CHEK2, NF1,  "RAD51C, RAD51D. For some of the moderate risk genes, there is often limited information regarding the degree to which a mutation in the gene affects risk of different types of cancer, and the appropriate management for individuals who have a mutation in one of these genes is not always clear. Our knowledge about the cancer risks associated with mutations in these moderate risk genes is always growing, and we will likely be able to provide more comprehensive information in the future.     We then discussed the type of results that can be obtained with this testing.   The first is a positive result.  The next is a negative result.  The last result is a \"maybe\" which we call a variant of unknown significant, meaning that we found a change in the DNA but we are not sure at this time if it increases the risk for cancer or not.    Additionally, we discussed the Genetic Information Nondiscrimination Act (DARREN). DARREN prohibits discrimination by health insurance plans and employers based on one's genetic information. DARREN does not apply to life, long-term care or disability insurance. These insurers are allowed to use genetic, personal or family health information to make coverage or premium decisions. Some states have their own genetic protection laws, providing additional security against genetic discrimination for these types of insurance. In addition, DARREN does not apply to members of the [place] , veterans obtaining health care through the 's Administration, individuals using the [facility], or federal employees enrolled in the Federal Employees Health Benefits program (FEHB).    Cora was counseled about hereditary cancer susceptibility including cancer risks, options for increased screening and/or risk reduction, genetic testing, and the implications for other family members. We discussed performing genetic testing in the context of a multi-gene panel test that looks at the BRCA1 and " BRCA2, as well as moderate penetrant genes. Cora is interested in this approach, which is recommended and was ordered today via the 40-gene CancerNext +RNAinsight panel from [facility].    PLAN:  Cora elected to undergo genetic testing via the 40-gene CancerNext +RNAinsight panel from [facility].   Verbal consent for testing was given.   [facility] will send a DNA/RNA blood kit to Cora's home.  Cora was instructed to bring the test kit to a  outpatient blood draw lab to have her blood drawn for testing (using the test tubes provided in the kit).   After blood is drawn, the  lab will send the sample out to [facility] for analysis.  Results are typically available in 3-4 weeks.  Cora was scheduled for a virtual follow-up appointment on 8/27/25 at 9:30 to review her test results.   We remain available to Cora or her family members at [phone] if any questions arise regarding information discussed at today's visit.    Funmilayo Anaya [facility] [place]  Licensed Genetic Counselor  [facility]  [phone]    A virtual (video and audio) visit between the patient (at the originating site) and provider (at the distant site) was utilized to provide this telehealth service. Verbal consent was requested and obtained from Cora [facility] on this date, July 29, 2025 for a telehealth visit.    Total time spent on day of encounter: 49 minutes (36 minutes with patient, 13 minutes on pre/post patient care activities, including documentation).

## 2025-07-29 NOTE — TELEPHONE ENCOUNTER
Prior authorization request received via fax for: VARENICLINE TARTRATE     Form given to: PLACED IN LEAD MA'S BOX ON: 7/29/2025

## 2025-07-29 NOTE — TELEPHONE ENCOUNTER
Left detailed voicemail re: Occipital nerve block scheduled for 8/1/25 was denied by [facility]. Reason given was that they do not feel there is enough literature to support it's effectiveness and is therefore not medically needed per Health plan policy [id number]. Invited patient to contact office for follow up if desired, left office # [phone]. Also encouraged her to contact insurance company.

## 2025-08-01 ENCOUNTER — APPOINTMENT (OUTPATIENT)
Dept: GASTROENTEROLOGY | Facility: HOSPITAL | Age: 45
End: 2025-08-01
Payer: COMMERCIAL

## 2025-08-19 ENCOUNTER — TELEPHONE (OUTPATIENT)
Dept: GENETICS | Facility: CLINIC | Age: 45
End: 2025-08-19
Payer: COMMERCIAL

## 2025-08-26 ENCOUNTER — TELEPHONE (OUTPATIENT)
Dept: GENETICS | Facility: CLINIC | Age: 45
End: 2025-08-26
Payer: COMMERCIAL

## 2025-08-27 ENCOUNTER — APPOINTMENT (OUTPATIENT)
Dept: GENETICS | Facility: CLINIC | Age: 45
End: 2025-08-27
Payer: COMMERCIAL

## 2025-09-02 ENCOUNTER — APPOINTMENT (OUTPATIENT)
Dept: INTEGRATIVE MEDICINE | Facility: CLINIC | Age: 45
End: 2025-09-02
Payer: COMMERCIAL

## 2025-10-14 ENCOUNTER — APPOINTMENT (OUTPATIENT)
Dept: PAIN MEDICINE | Facility: HOSPITAL | Age: 45
End: 2025-10-14
Payer: COMMERCIAL

## 2026-02-02 ENCOUNTER — APPOINTMENT (OUTPATIENT)
Dept: PRIMARY CARE | Facility: CLINIC | Age: 46
End: 2026-02-02
Payer: COMMERCIAL

## (undated) DEVICE — CORD, CAUTERY, BIOPOLAR FORCEP, 12FT

## (undated) DEVICE — Device

## (undated) DEVICE — BUR,  3MM X 3.8MM, NEURODRILL, LESS AGGR

## (undated) DEVICE — SUTURE, ETHILON, 2-0, 18 IN, FS, BLACK, BX/12

## (undated) DEVICE — BUR, MIS 3MM PRECISION NEURO

## (undated) DEVICE — SYRINGE, 10 CC, LUER LOCK

## (undated) DEVICE — DRAPE, C-ARM IMAGE

## (undated) DEVICE — DRAIN, WOUND, ROUND, W/TROCAR, HOLE PATTERN, 10 IN, MEDIUM, 1/8 X 49 IN

## (undated) DEVICE — APPLICATOR, CHLORAPREP, W/ORANGE TINT, 26ML

## (undated) DEVICE — CONTAINER, SPECIMEN, 120ML

## (undated) DEVICE — WOUND SYSTEM, DEBRIDEMENT & CLEANING, O.R DUOPAK

## (undated) DEVICE — PIN, SKULL, MAYFIELD, ADULT

## (undated) DEVICE — DRAPE, INCISE, ANTIMICROBIAL, IOBAN 2, 13 X 13 IN, DISPOSABLE, STERILE

## (undated) DEVICE — EVACUATOR, WOUND, SUCTION, CLOSED, JACKSON-PRATT, 100 CC, SILICONE

## (undated) DEVICE — DRAIN, JACKSON-PRATT, ROUND/W TROCAR, SILICONE, 10FR

## (undated) DEVICE — SPONGE, DISSECTOR, KITTNER BLUNT, 9/16 X .25 IN, STERILE 5 PK, ON C-5 HOLDER

## (undated) DEVICE — NEEDLE, SPINAL, 20 G X 3.5 IN, YELLOW HUB

## (undated) DEVICE — SUTURE, MONOCRYL, 5-0, 18 IN, PS2, UNDYED

## (undated) DEVICE — DRILL BIT, LEVERAGE, 7MM

## (undated) DEVICE — WAX, BONE 2.5G, STERILE

## (undated) DEVICE — SPONGE, NEURO, 1/2 X 1/2IN, STERILE, LF

## (undated) DEVICE — SUTURE, POLYSORB, 2-0, 18 IN, GS23, DETACHABLE, MULTIPACK, UNDYED

## (undated) DEVICE — DRAPE, INSTRUMENT, W/POUCH, STERI DRAPE, 7 X 11 IN, DISPOSABLE, STERILE

## (undated) DEVICE — ADHESIVE, SKIN, DERMABOND ADVANCED, 15CM, PEN-STYLE

## (undated) DEVICE — STAPLER, SKIN, PLUS, WIDE, 35

## (undated) DEVICE — ELECTRODE, ELECTROSURGICAL, BLADE, INSULATED, ENT/IMA, STERILE

## (undated) DEVICE — FLOSEAL, MATRIX, HEMOSTATIC, FULL STERILE PREP, 5ML

## (undated) DEVICE — DRAPE PACK, MINOR, CUSTOM, GEAUGA

## (undated) DEVICE — DRILL BIT, LEVERAGE, 5MM

## (undated) DEVICE — COVER, TABLE, 44X90

## (undated) DEVICE — GLOVE, SURGICAL, PROTEXIS PI ORTHO, 7.5, PF, LF

## (undated) DEVICE — SUTURE, VICRYL, 0, 18 IN, MO-4, VIOLET

## (undated) DEVICE — ELECTRODE, ELECTROSURGICAL, BLADE EXT 4 INCH, INSULATED